# Patient Record
Sex: MALE | Race: WHITE | NOT HISPANIC OR LATINO | URBAN - METROPOLITAN AREA
[De-identification: names, ages, dates, MRNs, and addresses within clinical notes are randomized per-mention and may not be internally consistent; named-entity substitution may affect disease eponyms.]

---

## 2017-02-13 ENCOUNTER — OFFICE (OUTPATIENT)
Dept: URBAN - METROPOLITAN AREA CLINIC 64 | Facility: CLINIC | Age: 32
End: 2017-02-13
Payer: COMMERCIAL

## 2017-02-13 VITALS
HEIGHT: 72 IN | SYSTOLIC BLOOD PRESSURE: 136 MMHG | HEART RATE: 85 BPM | DIASTOLIC BLOOD PRESSURE: 74 MMHG | WEIGHT: 225 LBS

## 2017-02-13 DIAGNOSIS — K58.9 IRRITABLE BOWEL SYNDROME WITHOUT DIARRHEA: ICD-10-CM

## 2017-02-13 PROCEDURE — 99212 OFFICE O/P EST SF 10 MIN: CPT | Performed by: INTERNAL MEDICINE

## 2017-02-14 ENCOUNTER — CONVERSION ENCOUNTER (OUTPATIENT)
Dept: FAMILY MEDICINE CLINIC | Facility: CLINIC | Age: 32
End: 2017-02-14

## 2017-02-23 ENCOUNTER — HOSPITAL ENCOUNTER (OUTPATIENT)
Dept: PHYSICAL THERAPY | Facility: HOSPITAL | Age: 32
Setting detail: RECURRING SERIES
Discharge: HOME | End: 2017-04-18
Attending: FAMILY MEDICINE | Admitting: FAMILY MEDICINE

## 2017-07-25 ENCOUNTER — CONVERSION ENCOUNTER (OUTPATIENT)
Dept: FAMILY MEDICINE CLINIC | Facility: CLINIC | Age: 32
End: 2017-07-25

## 2017-08-16 ENCOUNTER — OFFICE (OUTPATIENT)
Dept: URBAN - METROPOLITAN AREA CLINIC 64 | Facility: CLINIC | Age: 32
End: 2017-08-16
Payer: COMMERCIAL

## 2017-08-16 VITALS
DIASTOLIC BLOOD PRESSURE: 71 MMHG | WEIGHT: 230 LBS | HEIGHT: 72 IN | HEART RATE: 91 BPM | SYSTOLIC BLOOD PRESSURE: 113 MMHG

## 2017-08-16 DIAGNOSIS — K58.9 IRRITABLE BOWEL SYNDROME WITHOUT DIARRHEA: ICD-10-CM

## 2017-08-16 PROCEDURE — 99213 OFFICE O/P EST LOW 20 MIN: CPT | Performed by: INTERNAL MEDICINE

## 2017-08-16 RX ORDER — NORTRIPTYLINE HYDROCHLORIDE 50 MG/1
CAPSULE ORAL
Qty: 180 | Refills: 2 | Status: ACTIVE

## 2017-08-16 RX ORDER — DICYCLOMINE HYDROCHLORIDE 20 MG/1
40 TABLET ORAL
Qty: 180 | Refills: 3 | Status: ACTIVE

## 2017-09-18 ENCOUNTER — CONVERSION ENCOUNTER (OUTPATIENT)
Dept: FAMILY MEDICINE CLINIC | Facility: CLINIC | Age: 32
End: 2017-09-18

## 2017-11-10 ENCOUNTER — OFFICE (OUTPATIENT)
Dept: URBAN - METROPOLITAN AREA CLINIC 64 | Facility: CLINIC | Age: 32
End: 2017-11-10
Payer: COMMERCIAL

## 2017-11-10 VITALS
SYSTOLIC BLOOD PRESSURE: 130 MMHG | WEIGHT: 230 LBS | HEIGHT: 72 IN | DIASTOLIC BLOOD PRESSURE: 85 MMHG | HEART RATE: 85 BPM

## 2017-11-10 DIAGNOSIS — K58.1 IRRITABLE BOWEL SYNDROME WITH CONSTIPATION: ICD-10-CM

## 2017-11-10 PROCEDURE — 99213 OFFICE O/P EST LOW 20 MIN: CPT | Performed by: INTERNAL MEDICINE

## 2017-12-26 ENCOUNTER — CONVERSION ENCOUNTER (OUTPATIENT)
Dept: FAMILY MEDICINE CLINIC | Facility: CLINIC | Age: 32
End: 2017-12-26

## 2017-12-26 ENCOUNTER — HOSPITAL ENCOUNTER (OUTPATIENT)
Dept: LAB | Facility: HOSPITAL | Age: 32
Discharge: HOME | End: 2017-12-26
Attending: INTERNAL MEDICINE | Admitting: INTERNAL MEDICINE

## 2017-12-26 LAB
ALBUMIN SERPL-MCNC: 4.5 G/DL (ref 3.5–4.8)
ALBUMIN/GLOB SERPL: 1.6 {RATIO} (ref 1–1.7)
ALP SERPL-CCNC: 65 IU/L (ref 32–91)
ALT SERPL-CCNC: 36 IU/L (ref 17–63)
ANION GAP SERPL CALC-SCNC: 13.1 MMOL/L (ref 10–20)
AST SERPL-CCNC: 22 IU/L (ref 15–41)
BASOPHILS # BLD AUTO: 0 10*3/UL (ref 0–0.2)
BASOPHILS NFR BLD AUTO: 0 % (ref 0–2)
BILIRUB SERPL-MCNC: 0.4 MG/DL (ref 0.3–1.2)
BILIRUB UR QL STRIP: NEGATIVE MG/DL
BUN SERPL-MCNC: 11 MG/DL (ref 8–20)
BUN/CREAT SERPL: 12.2 (ref 6.2–20.3)
CALCIUM SERPL-MCNC: 9.5 MG/DL (ref 8.9–10.3)
CASTS URNS QL MICRO: NORMAL /[LPF]
CHLORIDE SERPL-SCNC: 97 MMOL/L (ref 101–111)
COLOR UR: YELLOW
CONV BACTERIA IN URINE MICRO: NEGATIVE
CONV CLARITY OF URINE: CLEAR
CONV CO2: 30 MMOL/L (ref 22–32)
CONV HIV-1/ HIV-2: NORMAL
CONV HIV-1/ HIV-2: NORMAL
CONV HYALINE CASTS IN URINE MICRO: 2 /[LPF] (ref 0–5)
CONV PROTEIN IN URINE BY AUTOMATED TEST STRIP: NEGATIVE MG/DL
CONV SMALL ROUND CELLS: NORMAL /[HPF]
CONV TOTAL PROTEIN: 7.4 G/DL (ref 6.1–7.9)
CONV UROBILINOGEN IN URINE BY AUTOMATED TEST STRIP: 0.2 MG/DL
CREAT UR-MCNC: 0.9 MG/DL (ref 0.7–1.2)
CRP SERPL-MCNC: 0.45 MG/DL (ref 0–0.7)
CULTURE INDICATED?: NORMAL
DIFFERENTIAL METHOD BLD: (no result)
EOSINOPHIL # BLD AUTO: 0.2 10*3/UL (ref 0–0.3)
EOSINOPHIL # BLD AUTO: 3 % (ref 0–3)
ERYTHROCYTE [DISTWIDTH] IN BLOOD BY AUTOMATED COUNT: 12.8 % (ref 11.5–14.5)
ERYTHROCYTE [SEDIMENTATION RATE] IN BLOOD BY WESTERGREN METHOD: 4 MM/HR (ref 0–15)
GLOBULIN UR ELPH-MCNC: 2.9 G/DL (ref 2.5–3.8)
GLUCOSE SERPL-MCNC: 90 MG/DL (ref 65–99)
GLUCOSE UR QL: NEGATIVE MG/DL
HCT VFR BLD AUTO: 48.6 % (ref 40–54)
HGB BLD-MCNC: 16.6 G/DL (ref 14–18)
HGB UR QL STRIP: NEGATIVE
KETONES UR QL STRIP: NEGATIVE MG/DL
LEUKOCYTE ESTERASE UR QL STRIP: NEGATIVE
LYMPHOCYTES # BLD AUTO: 1.5 10*3/UL (ref 0.8–4.8)
LYMPHOCYTES NFR BLD AUTO: 23 % (ref 18–42)
MCH RBC QN AUTO: 30.6 PG (ref 26–32)
MCHC RBC AUTO-ENTMCNC: 34 G/DL (ref 32–36)
MCV RBC AUTO: 89.9 FL (ref 80–94)
MONOCYTES # BLD AUTO: 0.6 10*3/UL (ref 0.1–1.3)
MONOCYTES NFR BLD AUTO: 10 % (ref 2–11)
NEUTROPHILS # BLD AUTO: 4 10*3/UL (ref 2.3–8.6)
NEUTROPHILS NFR BLD AUTO: 64 % (ref 50–75)
NITRITE UR QL STRIP: NEGATIVE
NRBC BLD AUTO-RTO: 0 /100{WBCS}
NRBC/RBC NFR BLD MANUAL: 0 10*3/UL
PH UR STRIP.AUTO: 7 [PH] (ref 4.5–8)
PLATELET # BLD AUTO: 340 10*3/UL (ref 150–450)
PMV BLD AUTO: 7.1 FL (ref 7.4–10.4)
POTASSIUM SERPL-SCNC: 4.1 MMOL/L (ref 3.6–5.1)
RBC # BLD AUTO: 5.41 10*6/UL (ref 4.6–6)
RBC #/AREA URNS HPF: 0 /[HPF] (ref 0–3)
SODIUM SERPL-SCNC: 136 MMOL/L (ref 136–144)
SP GR UR: 1.02 (ref 1–1.03)
SPERM URNS QL MICRO: NORMAL /[HPF]
SQUAMOUS SPT QL MICRO: 0 /[HPF] (ref 0–5)
UNIDENT CRYS URNS QL MICRO: NORMAL /[HPF]
WBC # BLD AUTO: 6.3 10*3/UL (ref 4.5–11.5)
WBC #/AREA URNS HPF: 0 /[HPF] (ref 0–5)
YEAST SPEC QL WET PREP: NORMAL /[HPF]

## 2017-12-27 LAB
HAV IGM SERPL QL IA: NONREACTIVE
HBV CORE IGM SERPL QL IA: ABNORMAL
HBV SURFACE AG SERPL QL IA: NONREACTIVE
HCV AB SER DONR QL: ABNORMAL
HCV AB SER DONR QL: ABNORMAL

## 2018-01-11 ENCOUNTER — OFFICE (OUTPATIENT)
Dept: URBAN - METROPOLITAN AREA CLINIC 64 | Facility: CLINIC | Age: 33
End: 2018-01-11
Payer: COMMERCIAL

## 2018-01-11 VITALS
SYSTOLIC BLOOD PRESSURE: 122 MMHG | WEIGHT: 220 LBS | HEIGHT: 72 IN | DIASTOLIC BLOOD PRESSURE: 77 MMHG | HEART RATE: 96 BPM

## 2018-01-11 DIAGNOSIS — K58.9 IRRITABLE BOWEL SYNDROME WITHOUT DIARRHEA: ICD-10-CM

## 2018-01-11 DIAGNOSIS — K59.00 CONSTIPATION, UNSPECIFIED: ICD-10-CM

## 2018-01-11 PROCEDURE — 99213 OFFICE O/P EST LOW 20 MIN: CPT | Performed by: INTERNAL MEDICINE

## 2018-01-17 ENCOUNTER — CONVERSION ENCOUNTER (OUTPATIENT)
Dept: FAMILY MEDICINE CLINIC | Facility: CLINIC | Age: 33
End: 2018-01-17

## 2018-01-31 ENCOUNTER — CONVERSION ENCOUNTER (OUTPATIENT)
Dept: FAMILY MEDICINE CLINIC | Facility: CLINIC | Age: 33
End: 2018-01-31

## 2018-02-22 ENCOUNTER — HOSPITAL ENCOUNTER (OUTPATIENT)
Dept: RHEUMATOLOGY | Facility: CLINIC | Age: 33
Discharge: HOME | End: 2018-02-22
Attending: INTERNAL MEDICINE | Admitting: INTERNAL MEDICINE

## 2018-02-22 ENCOUNTER — CONVERSION ENCOUNTER (OUTPATIENT)
Dept: FAMILY MEDICINE CLINIC | Facility: CLINIC | Age: 33
End: 2018-02-22

## 2018-02-23 ENCOUNTER — CONVERSION ENCOUNTER (OUTPATIENT)
Dept: FAMILY MEDICINE CLINIC | Facility: CLINIC | Age: 33
End: 2018-02-23

## 2018-03-05 ENCOUNTER — HOSPITAL ENCOUNTER (OUTPATIENT)
Dept: CT IMAGING | Facility: HOSPITAL | Age: 33
Discharge: HOME | End: 2018-03-05
Attending: FAMILY MEDICINE | Admitting: FAMILY MEDICINE

## 2018-03-05 ENCOUNTER — HOSPITAL ENCOUNTER (OUTPATIENT)
Dept: FAMILY MEDICINE CLINIC | Facility: CLINIC | Age: 33
Setting detail: SPECIMEN
Discharge: HOME | End: 2018-03-05
Attending: FAMILY MEDICINE | Admitting: FAMILY MEDICINE

## 2018-03-05 ENCOUNTER — CONVERSION ENCOUNTER (OUTPATIENT)
Dept: FAMILY MEDICINE CLINIC | Facility: CLINIC | Age: 33
End: 2018-03-05

## 2018-03-05 LAB
ALBUMIN SERPL-MCNC: 4.5 G/DL (ref 3.5–4.8)
ALBUMIN/GLOB SERPL: 1.5 {RATIO} (ref 1–1.7)
ALP SERPL-CCNC: 65 IU/L (ref 32–91)
ALT SERPL-CCNC: 51 IU/L (ref 17–63)
ANION GAP SERPL CALC-SCNC: 13 MMOL/L (ref 10–20)
AST SERPL-CCNC: 27 IU/L (ref 15–41)
BASOPHILS # BLD AUTO: 0.1 10*3/UL (ref 0–0.2)
BASOPHILS NFR BLD AUTO: 1 % (ref 0–2)
BILIRUB SERPL-MCNC: 0.7 MG/DL (ref 0.3–1.2)
BUN SERPL-MCNC: 11 MG/DL (ref 8–20)
BUN/CREAT SERPL: 13.8 (ref 6.2–20.3)
CALCIUM SERPL-MCNC: 9.7 MG/DL (ref 8.9–10.3)
CHLORIDE SERPL-SCNC: 102 MMOL/L (ref 101–111)
CONV CO2: 26 MMOL/L (ref 22–32)
CONV TOTAL PROTEIN: 7.5 G/DL (ref 6.1–7.9)
CREAT UR-MCNC: 0.8 MG/DL (ref 0.7–1.2)
DIFFERENTIAL METHOD BLD: (no result)
EOSINOPHIL # BLD AUTO: 0.4 10*3/UL (ref 0–0.3)
EOSINOPHIL # BLD AUTO: 6 % (ref 0–3)
ERYTHROCYTE [DISTWIDTH] IN BLOOD BY AUTOMATED COUNT: 12.4 % (ref 11.5–14.5)
GLOBULIN UR ELPH-MCNC: 3 G/DL (ref 2.5–3.8)
GLUCOSE SERPL-MCNC: 91 MG/DL (ref 65–99)
HCT VFR BLD AUTO: 46.5 % (ref 40–54)
HGB BLD-MCNC: 15.9 G/DL (ref 14–18)
LYMPHOCYTES # BLD AUTO: 2 10*3/UL (ref 0.8–4.8)
LYMPHOCYTES NFR BLD AUTO: 27 % (ref 18–42)
MCH RBC QN AUTO: 30.4 PG (ref 26–32)
MCHC RBC AUTO-ENTMCNC: 34.1 G/DL (ref 32–36)
MCV RBC AUTO: 89.2 FL (ref 80–94)
MONOCYTES # BLD AUTO: 0.8 10*3/UL (ref 0.1–1.3)
MONOCYTES NFR BLD AUTO: 10 % (ref 2–11)
NEUTROPHILS # BLD AUTO: 4.3 10*3/UL (ref 2.3–8.6)
NEUTROPHILS NFR BLD AUTO: 56 % (ref 50–75)
NRBC BLD AUTO-RTO: 0 /100{WBCS}
NRBC/RBC NFR BLD MANUAL: 0 10*3/UL
PLATELET # BLD AUTO: 386 10*3/UL (ref 150–450)
PMV BLD AUTO: 7.6 FL (ref 7.4–10.4)
POTASSIUM SERPL-SCNC: 4 MMOL/L (ref 3.6–5.1)
RBC # BLD AUTO: 5.22 10*6/UL (ref 4.6–6)
SODIUM SERPL-SCNC: 137 MMOL/L (ref 136–144)
WBC # BLD AUTO: 7.5 10*3/UL (ref 4.5–11.5)

## 2018-03-12 ENCOUNTER — HOSPITAL ENCOUNTER (OUTPATIENT)
Dept: CARDIOLOGY | Facility: HOSPITAL | Age: 33
Discharge: HOME | End: 2018-03-12
Attending: INTERNAL MEDICINE | Admitting: INTERNAL MEDICINE

## 2018-04-23 ENCOUNTER — HOSPITAL ENCOUNTER (OUTPATIENT)
Dept: LAB | Facility: HOSPITAL | Age: 33
Discharge: HOME | End: 2018-04-23
Attending: INTERNAL MEDICINE | Admitting: INTERNAL MEDICINE

## 2018-04-24 ENCOUNTER — CONVERSION ENCOUNTER (OUTPATIENT)
Dept: FAMILY MEDICINE CLINIC | Facility: CLINIC | Age: 33
End: 2018-04-24

## 2018-04-25 LAB — HBV CORE IGM SERPL QL IA: NONREACTIVE

## 2018-05-07 ENCOUNTER — CONVERSION ENCOUNTER (OUTPATIENT)
Dept: FAMILY MEDICINE CLINIC | Facility: CLINIC | Age: 33
End: 2018-05-07

## 2018-09-21 ENCOUNTER — HOSPITAL ENCOUNTER (OUTPATIENT)
Dept: FAMILY MEDICINE CLINIC | Facility: CLINIC | Age: 33
Setting detail: SPECIMEN
Discharge: HOME | End: 2018-09-21
Attending: FAMILY MEDICINE | Admitting: FAMILY MEDICINE

## 2018-09-21 ENCOUNTER — CONVERSION ENCOUNTER (OUTPATIENT)
Dept: FAMILY MEDICINE CLINIC | Facility: CLINIC | Age: 33
End: 2018-09-21

## 2018-09-21 LAB
ALBUMIN SERPL-MCNC: 4.6 G/DL (ref 3.5–4.8)
ALBUMIN/GLOB SERPL: 1.5 {RATIO} (ref 1–1.7)
ALP SERPL-CCNC: 63 IU/L (ref 32–91)
ALT SERPL-CCNC: 33 IU/L (ref 17–63)
ANION GAP SERPL CALC-SCNC: 11.4 MMOL/L (ref 10–20)
AST SERPL-CCNC: 22 IU/L (ref 15–41)
BASOPHILS # BLD AUTO: 0 10*3/UL (ref 0–0.2)
BASOPHILS NFR BLD AUTO: 1 % (ref 0–2)
BILIRUB SERPL-MCNC: 0.7 MG/DL (ref 0.3–1.2)
BUN SERPL-MCNC: 12 MG/DL (ref 8–20)
BUN/CREAT SERPL: 13.3 (ref 6.2–20.3)
CALCIUM SERPL-MCNC: 9.6 MG/DL (ref 8.9–10.3)
CHLORIDE SERPL-SCNC: 99 MMOL/L (ref 101–111)
CONV CO2: 31 MMOL/L (ref 22–32)
CONV TOTAL PROTEIN: 7.7 G/DL (ref 6.1–7.9)
CREAT UR-MCNC: 0.9 MG/DL (ref 0.7–1.2)
DIFFERENTIAL METHOD BLD: (no result)
EOSINOPHIL # BLD AUTO: 0.3 10*3/UL (ref 0–0.3)
EOSINOPHIL # BLD AUTO: 3 % (ref 0–3)
ERYTHROCYTE [DISTWIDTH] IN BLOOD BY AUTOMATED COUNT: 12.8 % (ref 11.5–14.5)
GLOBULIN UR ELPH-MCNC: 3.1 G/DL (ref 2.5–3.8)
GLUCOSE SERPL-MCNC: 86 MG/DL (ref 65–99)
HCT VFR BLD AUTO: 49 % (ref 40–54)
HGB BLD-MCNC: 16.6 G/DL (ref 14–18)
LYMPHOCYTES # BLD AUTO: 1.9 10*3/UL (ref 0.8–4.8)
LYMPHOCYTES NFR BLD AUTO: 22 % (ref 18–42)
MCH RBC QN AUTO: 30.4 PG (ref 26–32)
MCHC RBC AUTO-ENTMCNC: 33.9 G/DL (ref 32–36)
MCV RBC AUTO: 89.7 FL (ref 80–94)
MONOCYTES # BLD AUTO: 0.9 10*3/UL (ref 0.1–1.3)
MONOCYTES NFR BLD AUTO: 10 % (ref 2–11)
NEUTROPHILS # BLD AUTO: 5.5 10*3/UL (ref 2.3–8.6)
NEUTROPHILS NFR BLD AUTO: 64 % (ref 50–75)
NRBC BLD AUTO-RTO: 0 /100{WBCS}
NRBC/RBC NFR BLD MANUAL: 0 10*3/UL
PLATELET # BLD AUTO: 353 10*3/UL (ref 150–450)
PMV BLD AUTO: 8.6 FL (ref 7.4–10.4)
POTASSIUM SERPL-SCNC: 4.4 MMOL/L (ref 3.6–5.1)
RBC # BLD AUTO: 5.46 10*6/UL (ref 4.6–6)
SODIUM SERPL-SCNC: 137 MMOL/L (ref 136–144)
WBC # BLD AUTO: 8.6 10*3/UL (ref 4.5–11.5)

## 2018-10-25 ENCOUNTER — HOSPITAL ENCOUNTER (OUTPATIENT)
Dept: LAB | Facility: HOSPITAL | Age: 33
Discharge: HOME | End: 2018-10-25
Attending: INTERNAL MEDICINE | Admitting: INTERNAL MEDICINE

## 2018-10-25 ENCOUNTER — OFFICE (OUTPATIENT)
Dept: URBAN - METROPOLITAN AREA CLINIC 64 | Facility: CLINIC | Age: 33
End: 2018-10-25
Payer: COMMERCIAL

## 2018-10-25 VITALS
HEART RATE: 106 BPM | HEIGHT: 72 IN | WEIGHT: 216 LBS | SYSTOLIC BLOOD PRESSURE: 112 MMHG | DIASTOLIC BLOOD PRESSURE: 76 MMHG

## 2018-10-25 DIAGNOSIS — K58.9 IRRITABLE BOWEL SYNDROME WITHOUT DIARRHEA: ICD-10-CM

## 2018-10-25 DIAGNOSIS — F84.0 AUTISTIC DISORDER: ICD-10-CM

## 2018-10-25 DIAGNOSIS — R53.83 OTHER FATIGUE: ICD-10-CM

## 2018-10-25 DIAGNOSIS — R10.32 LEFT LOWER QUADRANT PAIN: ICD-10-CM

## 2018-10-25 LAB
TESTOST SERPL-MCNC: 2.01 NG/ML (ref 1.7–7.8)
TSH SERPL-ACNC: 0.96 UIU/ML (ref 0.34–5.6)

## 2018-10-25 PROCEDURE — 99214 OFFICE O/P EST MOD 30 MIN: CPT | Performed by: INTERNAL MEDICINE

## 2018-10-25 RX ORDER — NORTRIPTYLINE HYDROCHLORIDE 50 MG/1
CAPSULE ORAL
Qty: 180 | Refills: 2 | Status: ACTIVE

## 2018-10-25 RX ORDER — DICYCLOMINE HYDROCHLORIDE 20 MG/1
40 TABLET ORAL
Qty: 180 | Refills: 3 | Status: ACTIVE

## 2018-10-25 RX ORDER — METRONIDAZOLE 500 MG/1
1500 TABLET, FILM COATED ORAL
Qty: 30 | Refills: 0 | Status: COMPLETED
Start: 2018-10-25 | End: 2019-01-08

## 2018-11-05 ENCOUNTER — HOSPITAL ENCOUNTER (OUTPATIENT)
Dept: LAB | Facility: HOSPITAL | Age: 33
Discharge: HOME | End: 2018-11-05
Attending: NURSE PRACTITIONER | Admitting: NURSE PRACTITIONER

## 2018-11-05 ENCOUNTER — CONVERSION ENCOUNTER (OUTPATIENT)
Dept: FAMILY MEDICINE CLINIC | Facility: CLINIC | Age: 33
End: 2018-11-05

## 2018-11-05 LAB
ALBUMIN SERPL-MCNC: 4.6 G/DL (ref 3.5–4.8)
ALBUMIN/GLOB SERPL: 1.7 {RATIO} (ref 1–1.7)
ALP SERPL-CCNC: 55 IU/L (ref 32–91)
ALT SERPL-CCNC: 39 IU/L (ref 17–63)
ANION GAP SERPL CALC-SCNC: 15.1 MMOL/L (ref 10–20)
AST SERPL-CCNC: 31 IU/L (ref 15–41)
BASOPHILS # BLD AUTO: 0 10*3/UL (ref 0–0.2)
BASOPHILS NFR BLD AUTO: 0 % (ref 0–2)
BILIRUB SERPL-MCNC: 0.8 MG/DL (ref 0.3–1.2)
BUN SERPL-MCNC: 9 MG/DL (ref 8–20)
BUN/CREAT SERPL: 10 (ref 6.2–20.3)
CALCIUM SERPL-MCNC: 9.4 MG/DL (ref 8.9–10.3)
CHLORIDE SERPL-SCNC: 96 MMOL/L (ref 101–111)
CONV CO2: 26 MMOL/L (ref 22–32)
CONV TOTAL PROTEIN: 7.3 G/DL (ref 6.1–7.9)
CREAT UR-MCNC: 0.9 MG/DL (ref 0.7–1.2)
CRP SERPL-MCNC: 0.41 MG/DL (ref 0–0.7)
DIFFERENTIAL METHOD BLD: (no result)
EOSINOPHIL # BLD AUTO: 0.1 10*3/UL (ref 0–0.3)
EOSINOPHIL # BLD AUTO: 1 % (ref 0–3)
ERYTHROCYTE [DISTWIDTH] IN BLOOD BY AUTOMATED COUNT: 12.7 % (ref 11.5–14.5)
ERYTHROCYTE [SEDIMENTATION RATE] IN BLOOD BY WESTERGREN METHOD: 5 MM/HR (ref 0–15)
GLOBULIN UR ELPH-MCNC: 2.7 G/DL (ref 2.5–3.8)
GLUCOSE SERPL-MCNC: 90 MG/DL (ref 65–99)
HCT VFR BLD AUTO: 46.8 % (ref 40–54)
HGB BLD-MCNC: 16.1 G/DL (ref 14–18)
LYMPHOCYTES # BLD AUTO: 1.8 10*3/UL (ref 0.8–4.8)
LYMPHOCYTES NFR BLD AUTO: 18 % (ref 18–42)
MAGNESIUM SERPL-MCNC: 1.9 MG/DL (ref 1.8–2.5)
MCH RBC QN AUTO: 30.3 PG (ref 26–32)
MCHC RBC AUTO-ENTMCNC: 34.4 G/DL (ref 32–36)
MCV RBC AUTO: 88.1 FL (ref 80–94)
MONOCYTES # BLD AUTO: 0.9 10*3/UL (ref 0.1–1.3)
MONOCYTES NFR BLD AUTO: 9 % (ref 2–11)
NEUTROPHILS # BLD AUTO: 7.1 10*3/UL (ref 2.3–8.6)
NEUTROPHILS NFR BLD AUTO: 72 % (ref 50–75)
NRBC BLD AUTO-RTO: 0 /100{WBCS}
NRBC/RBC NFR BLD MANUAL: 0 10*3/UL
PLATELET # BLD AUTO: 418 10*3/UL (ref 150–450)
PMV BLD AUTO: 7.8 FL (ref 7.4–10.4)
POTASSIUM SERPL-SCNC: 4.1 MMOL/L (ref 3.6–5.1)
RBC # BLD AUTO: 5.32 10*6/UL (ref 4.6–6)
SODIUM SERPL-SCNC: 133 MMOL/L (ref 136–144)
URATE SERPL-MCNC: 7.3 MG/DL (ref 4.8–8.7)
WBC # BLD AUTO: 10 10*3/UL (ref 4.5–11.5)

## 2018-11-13 ENCOUNTER — CONVERSION ENCOUNTER (OUTPATIENT)
Dept: FAMILY MEDICINE CLINIC | Facility: CLINIC | Age: 33
End: 2018-11-13

## 2018-12-04 ENCOUNTER — CONVERSION ENCOUNTER (OUTPATIENT)
Dept: FAMILY MEDICINE CLINIC | Facility: CLINIC | Age: 33
End: 2018-12-04

## 2019-01-07 ENCOUNTER — CONVERSION ENCOUNTER (OUTPATIENT)
Dept: FAMILY MEDICINE CLINIC | Facility: CLINIC | Age: 34
End: 2019-01-07

## 2019-01-08 ENCOUNTER — OFFICE (OUTPATIENT)
Dept: URBAN - METROPOLITAN AREA CLINIC 64 | Facility: CLINIC | Age: 34
End: 2019-01-08
Payer: COMMERCIAL

## 2019-01-08 VITALS
HEIGHT: 72 IN | HEART RATE: 99 BPM | DIASTOLIC BLOOD PRESSURE: 86 MMHG | SYSTOLIC BLOOD PRESSURE: 122 MMHG | WEIGHT: 213 LBS

## 2019-01-08 DIAGNOSIS — K58.9 IRRITABLE BOWEL SYNDROME WITHOUT DIARRHEA: ICD-10-CM

## 2019-01-08 DIAGNOSIS — F84.0 AUTISTIC DISORDER: ICD-10-CM

## 2019-01-08 PROCEDURE — 99213 OFFICE O/P EST LOW 20 MIN: CPT | Performed by: INTERNAL MEDICINE

## 2019-02-05 ENCOUNTER — HOSPITAL ENCOUNTER (OUTPATIENT)
Dept: PHYSICAL THERAPY | Facility: HOSPITAL | Age: 34
Setting detail: RECURRING SERIES
Discharge: HOME | End: 2019-03-29
Attending: FAMILY MEDICINE | Admitting: FAMILY MEDICINE

## 2019-02-19 ENCOUNTER — HOSPITAL ENCOUNTER (OUTPATIENT)
Dept: FAMILY MEDICINE CLINIC | Facility: CLINIC | Age: 34
Setting detail: SPECIMEN
Discharge: HOME | End: 2019-02-19
Attending: FAMILY MEDICINE | Admitting: FAMILY MEDICINE

## 2019-02-19 ENCOUNTER — CONVERSION ENCOUNTER (OUTPATIENT)
Dept: FAMILY MEDICINE CLINIC | Facility: CLINIC | Age: 34
End: 2019-02-19

## 2019-02-19 LAB
ALBUMIN SERPL-MCNC: 4.7 G/DL (ref 3.5–4.8)
ALBUMIN/GLOB SERPL: 1.6 {RATIO} (ref 1–1.7)
ALP SERPL-CCNC: 71 IU/L (ref 32–91)
ALT SERPL-CCNC: 38 IU/L (ref 17–63)
ANION GAP SERPL CALC-SCNC: 15.2 MMOL/L (ref 10–20)
AST SERPL-CCNC: 24 IU/L (ref 15–41)
BASOPHILS # BLD AUTO: 0.1 10*3/UL (ref 0–0.2)
BASOPHILS NFR BLD AUTO: 1 % (ref 0–2)
BILIRUB SERPL-MCNC: 0.9 MG/DL (ref 0.3–1.2)
BUN SERPL-MCNC: 10 MG/DL (ref 8–20)
BUN/CREAT SERPL: 12.5 (ref 6.2–20.3)
CALCIUM SERPL-MCNC: 9.3 MG/DL (ref 8.9–10.3)
CHLORIDE SERPL-SCNC: 95 MMOL/L (ref 101–111)
CONV CO2: 27 MMOL/L (ref 22–32)
CONV TOTAL PROTEIN: 7.6 G/DL (ref 6.1–7.9)
CREAT UR-MCNC: 0.8 MG/DL (ref 0.7–1.2)
DIFFERENTIAL METHOD BLD: (no result)
EOSINOPHIL # BLD AUTO: 0.2 10*3/UL (ref 0–0.3)
EOSINOPHIL # BLD AUTO: 2 % (ref 0–3)
ERYTHROCYTE [DISTWIDTH] IN BLOOD BY AUTOMATED COUNT: 12.7 % (ref 11.5–14.5)
GLOBULIN UR ELPH-MCNC: 2.9 G/DL (ref 2.5–3.8)
GLUCOSE SERPL-MCNC: 88 MG/DL (ref 65–99)
HCT VFR BLD AUTO: 48.7 % (ref 40–54)
HGB BLD-MCNC: 16.5 G/DL (ref 14–18)
LYMPHOCYTES # BLD AUTO: 1.9 10*3/UL (ref 0.8–4.8)
LYMPHOCYTES NFR BLD AUTO: 23 % (ref 18–42)
MCH RBC QN AUTO: 30.9 PG (ref 26–32)
MCHC RBC AUTO-ENTMCNC: 33.8 G/DL (ref 32–36)
MCV RBC AUTO: 91.4 FL (ref 80–94)
MONOCYTES # BLD AUTO: 0.8 10*3/UL (ref 0.1–1.3)
MONOCYTES NFR BLD AUTO: 10 % (ref 2–11)
NEUTROPHILS # BLD AUTO: 5.4 10*3/UL (ref 2.3–8.6)
NEUTROPHILS NFR BLD AUTO: 64 % (ref 50–75)
NRBC BLD AUTO-RTO: 0 /100{WBCS}
NRBC/RBC NFR BLD MANUAL: 0 10*3/UL
PLATELET # BLD AUTO: 395 10*3/UL (ref 150–450)
PMV BLD AUTO: 7.7 FL (ref 7.4–10.4)
POTASSIUM SERPL-SCNC: 4.2 MMOL/L (ref 3.6–5.1)
RBC # BLD AUTO: 5.33 10*6/UL (ref 4.6–6)
SODIUM SERPL-SCNC: 133 MMOL/L (ref 136–144)
WBC # BLD AUTO: 8.5 10*3/UL (ref 4.5–11.5)

## 2019-04-16 ENCOUNTER — OFFICE (OUTPATIENT)
Dept: URBAN - METROPOLITAN AREA CLINIC 64 | Facility: CLINIC | Age: 34
End: 2019-04-16
Payer: COMMERCIAL

## 2019-04-16 VITALS
SYSTOLIC BLOOD PRESSURE: 129 MMHG | DIASTOLIC BLOOD PRESSURE: 95 MMHG | HEIGHT: 72 IN | WEIGHT: 215 LBS | HEART RATE: 96 BPM

## 2019-04-16 DIAGNOSIS — K59.00 CONSTIPATION, UNSPECIFIED: ICD-10-CM

## 2019-04-16 DIAGNOSIS — K58.9 IRRITABLE BOWEL SYNDROME WITHOUT DIARRHEA: ICD-10-CM

## 2019-04-16 DIAGNOSIS — K21.9 GASTRO-ESOPHAGEAL REFLUX DISEASE WITHOUT ESOPHAGITIS: ICD-10-CM

## 2019-04-16 PROCEDURE — 99213 OFFICE O/P EST LOW 20 MIN: CPT | Performed by: INTERNAL MEDICINE

## 2019-04-16 RX ORDER — ESOMEPRAZOLE MAGNESIUM 40 MG/1
40 CAPSULE, DELAYED RELEASE ORAL
Qty: 30 | Refills: 11 | Status: COMPLETED
Start: 2019-04-16 | End: 2020-11-02

## 2019-04-16 RX ORDER — POLYETHYLENE GLYCOL 3350 17 G/17G
17 POWDER, FOR SOLUTION ORAL
Qty: 510 | Refills: 11 | Status: COMPLETED
Start: 2019-04-16 | End: 2021-05-13

## 2019-05-06 ENCOUNTER — HOSPITAL ENCOUNTER (OUTPATIENT)
Dept: LAB | Facility: HOSPITAL | Age: 34
Discharge: HOME | End: 2019-05-06
Attending: INTERNAL MEDICINE | Admitting: INTERNAL MEDICINE

## 2019-06-04 VITALS
HEART RATE: 97 BPM | HEART RATE: 106 BPM | DIASTOLIC BLOOD PRESSURE: 77 MMHG | BODY MASS INDEX: 30.07 KG/M2 | WEIGHT: 213 LBS | BODY MASS INDEX: 28.62 KG/M2 | SYSTOLIC BLOOD PRESSURE: 132 MMHG | DIASTOLIC BLOOD PRESSURE: 85 MMHG | BODY MASS INDEX: 29.8 KG/M2 | DIASTOLIC BLOOD PRESSURE: 76 MMHG | BODY MASS INDEX: 29.02 KG/M2 | OXYGEN SATURATION: 97 % | BODY MASS INDEX: 31.19 KG/M2 | HEART RATE: 118 BPM | WEIGHT: 227 LBS | BODY MASS INDEX: 28.44 KG/M2 | SYSTOLIC BLOOD PRESSURE: 133 MMHG | OXYGEN SATURATION: 98 % | DIASTOLIC BLOOD PRESSURE: 79 MMHG | WEIGHT: 222 LBS | OXYGEN SATURATION: 97 % | OXYGEN SATURATION: 97 % | WEIGHT: 219 LBS | HEIGHT: 72 IN | DIASTOLIC BLOOD PRESSURE: 94 MMHG | HEART RATE: 94 BPM | HEART RATE: 104 BPM | WEIGHT: 214 LBS | SYSTOLIC BLOOD PRESSURE: 123 MMHG | BODY MASS INDEX: 28.35 KG/M2 | HEART RATE: 93 BPM | WEIGHT: 209 LBS | HEIGHT: 72 IN | WEIGHT: 230 LBS | HEART RATE: 86 BPM | SYSTOLIC BLOOD PRESSURE: 118 MMHG | BODY MASS INDEX: 28.89 KG/M2 | WEIGHT: 220 LBS | BODY MASS INDEX: 29.8 KG/M2 | HEIGHT: 72 IN | HEART RATE: 68 BPM | DIASTOLIC BLOOD PRESSURE: 83 MMHG | OXYGEN SATURATION: 97 % | DIASTOLIC BLOOD PRESSURE: 80 MMHG | WEIGHT: 222 LBS | WEIGHT: 212.25 LBS | OXYGEN SATURATION: 96 % | WEIGHT: 210 LBS | DIASTOLIC BLOOD PRESSURE: 83 MMHG | HEART RATE: 106 BPM | OXYGEN SATURATION: 97 % | BODY MASS INDEX: 28.75 KG/M2 | SYSTOLIC BLOOD PRESSURE: 126 MMHG | WEIGHT: 214 LBS | SYSTOLIC BLOOD PRESSURE: 112 MMHG | HEIGHT: 72 IN | BODY MASS INDEX: 29.66 KG/M2 | SYSTOLIC BLOOD PRESSURE: 117 MMHG | DIASTOLIC BLOOD PRESSURE: 76 MMHG | BODY MASS INDEX: 30.11 KG/M2 | HEART RATE: 103 BPM | HEART RATE: 80 BPM | HEIGHT: 72 IN | WEIGHT: 226.6 LBS | SYSTOLIC BLOOD PRESSURE: 124 MMHG | SYSTOLIC BLOOD PRESSURE: 149 MMHG | DIASTOLIC BLOOD PRESSURE: 94 MMHG | DIASTOLIC BLOOD PRESSURE: 87 MMHG | DIASTOLIC BLOOD PRESSURE: 90 MMHG | SYSTOLIC BLOOD PRESSURE: 127 MMHG | DIASTOLIC BLOOD PRESSURE: 74 MMHG | BODY MASS INDEX: 30.75 KG/M2 | HEIGHT: 72 IN | OXYGEN SATURATION: 97 % | SYSTOLIC BLOOD PRESSURE: 118 MMHG | HEART RATE: 110 BPM | OXYGEN SATURATION: 96 % | OXYGEN SATURATION: 98 % | HEART RATE: 104 BPM | SYSTOLIC BLOOD PRESSURE: 137 MMHG | SYSTOLIC BLOOD PRESSURE: 145 MMHG | SYSTOLIC BLOOD PRESSURE: 134 MMHG | HEART RATE: 115 BPM | WEIGHT: 219 LBS | DIASTOLIC BLOOD PRESSURE: 87 MMHG | OXYGEN SATURATION: 97 % | DIASTOLIC BLOOD PRESSURE: 78 MMHG | HEIGHT: 72 IN | WEIGHT: 229 LBS | HEART RATE: 113 BPM | BODY MASS INDEX: 29.7 KG/M2 | OXYGEN SATURATION: 96 % | WEIGHT: 211 LBS | OXYGEN SATURATION: 97 % | WEIGHT: 220 LBS | BODY MASS INDEX: 31.06 KG/M2 | SYSTOLIC BLOOD PRESSURE: 136 MMHG | SYSTOLIC BLOOD PRESSURE: 137 MMHG | DIASTOLIC BLOOD PRESSURE: 79 MMHG | HEART RATE: 90 BPM

## 2019-07-05 ENCOUNTER — OFFICE VISIT (OUTPATIENT)
Dept: FAMILY MEDICINE CLINIC | Facility: CLINIC | Age: 34
End: 2019-07-05

## 2019-07-05 VITALS
TEMPERATURE: 97 F | OXYGEN SATURATION: 99 % | BODY MASS INDEX: 27.72 KG/M2 | HEIGHT: 74 IN | SYSTOLIC BLOOD PRESSURE: 125 MMHG | WEIGHT: 216 LBS | DIASTOLIC BLOOD PRESSURE: 88 MMHG | HEART RATE: 91 BPM

## 2019-07-05 DIAGNOSIS — S16.1XXA NECK STRAIN, INITIAL ENCOUNTER: Primary | ICD-10-CM

## 2019-07-05 PROBLEM — K59.00 CONSTIPATION: Status: ACTIVE | Noted: 2018-09-21

## 2019-07-05 PROBLEM — I10 HYPERTENSION: Status: ACTIVE | Noted: 2019-07-05

## 2019-07-05 PROBLEM — R48.0 DYSLEXIA: Status: ACTIVE | Noted: 2017-12-26

## 2019-07-05 PROBLEM — R76.8 ELEVATED RHEUMATOID FACTOR: Status: ACTIVE | Noted: 2019-05-06

## 2019-07-05 PROBLEM — K58.9 IBS (IRRITABLE BOWEL SYNDROME): Status: ACTIVE | Noted: 2018-09-21

## 2019-07-05 PROBLEM — F32.A DEPRESSION: Status: ACTIVE | Noted: 2019-07-05

## 2019-07-05 PROBLEM — E55.9 VITAMIN D DEFICIENCY: Status: ACTIVE | Noted: 2017-12-26

## 2019-07-05 PROBLEM — R55 NEAR SYNCOPE: Status: ACTIVE | Noted: 2019-02-19

## 2019-07-05 PROBLEM — F41.9 ANXIETY DISORDER: Status: ACTIVE | Noted: 2018-09-21

## 2019-07-05 PROBLEM — G56.00 CARPAL TUNNEL SYNDROME: Status: ACTIVE | Noted: 2018-02-22

## 2019-07-05 PROCEDURE — 99213 OFFICE O/P EST LOW 20 MIN: CPT | Performed by: FAMILY MEDICINE

## 2019-07-05 RX ORDER — NAPROXEN 500 MG/1
500 TABLET ORAL 2 TIMES DAILY WITH MEALS
Qty: 60 TABLET | Refills: 2 | Status: SHIPPED | OUTPATIENT
Start: 2019-07-05 | End: 2020-12-14

## 2019-07-05 NOTE — PROGRESS NOTES
Subjective   Chief Complaint   Patient presents with   • Neck Pain     x 1 wk ago that is now feeling better   • Nausea     Davian Arevalo is a 33 y.o. male.     Neck Pain    This is a new problem. The current episode started in the past 7 days. The problem occurs intermittently. The problem has been gradually improving. The pain is associated with nothing. The pain is present in the midline. The quality of the pain is described as aching. The pain is severe (nausea). The symptoms are aggravated by position. Pertinent negatives include no chest pain, fever, syncope or trouble swallowing. He has tried home exercises for the symptoms. The treatment provided moderate relief.      Past Medical History:   Diagnosis Date   • Abdominal pain, chronic, right lower quadrant 01/30/2015   • Anxiety    • Autism 08/13/2014    mild ( Abstracted from [facility]   • Carpal tunnel syndrome 02/22/2018   • Chest pain 02/23/2018   • Constipation 09/21/2018   • Depression    • Dermatitis 01/07/2019   • Dyslexia 12/26/2017   • Fatigue 08/20/2015   • Hypertension    • IBS (irritable bowel syndrome) 09/21/2018   • Joint pain 12/26/2017   • Knee pain, left 07/25/2017   • Lower back pain 01/30/2015   • Near syncope 02/19/2019   • Neck pain on left side 11/13/2018   • Numbness and tingling of right arm 12/26/2017   • Pain in joint of right wrist 02/22/2018   • Rheumatoid factor positive 05/06/2019   • Right cervical radiculopathy 01/07/2019   • Shoulder pain, right 02/14/2017   • Tachycardia 02/19/2019   • Visit for preventive health examination 08/13/2014   • Vitamin D deficiency 12/26/2017     Past Surgical History:   Procedure Laterality Date   • CHOLECYSTECTOMY     • TONSILLECTOMY  1993    Tonsillectomy and Adenoids-     Allergies   Allergen Reactions   • Amoxicillin Unknown (See Comments)     Abstracted from [facility].    • Ceclor [Cefaclor] Unknown (See Comments)     Abstracted from [facility].    • Cephalexin Unknown (See  Comments)     Abstracted from [facility].      Social History     Socioeconomic History   • Marital status: Single     Spouse name: Not on file   • Number of children: Not on file   • Years of education: Not on file   • Highest education level: Not on file   Tobacco Use   • Smoking status: Never Smoker   • Smokeless tobacco: Never Used   Substance and Sexual Activity   • Alcohol use: No     Frequency: Never   • Drug use: No     Social History     Tobacco Use   Smoking Status Never Smoker   Smokeless Tobacco Never Used       family history includes Cancer in his father and mother; Diabetes in his maternal grandfather, maternal grandmother, and paternal uncle; Hypertension in his father and mother; Stroke in his father, maternal grandfather, and other.  Current Outpatient Medications on File Prior to Visit   Medication Sig Dispense Refill   • ALLERGY SERUM INJECTION Inject  under the skin into the appropriate area as directed 1 (One) Time.     • cholecalciferol (VITAMIN D3) 1000 units tablet Take 1,000 Units by mouth Daily.     • dicyclomine (BENTYL) 20 MG tablet Take 20 mg by mouth As Needed.     • fluticasone (FLONASE) 50 MCG/ACT nasal spray 2 sprays into the nostril(s) as directed by provider Daily. 2 sprays per nostril. ( Abstracted from [facility]. )     • lisinopril (PRINIVIL,ZESTRIL) 20 MG tablet Take 20 mg by mouth Daily.     • metoprolol succinate XL (TOPROL-XL) 25 MG 24 hr tablet Take 25 mg by mouth Daily.     • nortriptyline (PAMELOR) 50 MG capsule Take 50 mg by mouth Every Night.     • ondansetron (ZOFRAN) 4 MG tablet Take 4 mg by mouth Every 6 (Six) Hours As Needed for Nausea or Vomiting (take one tablet by mouth every 6 hours as needed for nausea ( Abstracted from [facility]. )).     • PATIENT SUPPLIED ALLERGY INJECTION Inject  under the skin into the appropriate area as directed 1 (One) Time. 1X per week. ( Abstracted from [facility].)     • vitamin B-12 (CYANOCOBALAMIN) 1000 MCG tablet Take 1,000 mcg  "by mouth Daily.     • [DISCONTINUED] Naproxen-Esomeprazole (VIMOVO) 500-20 MG tablet delayed-release Take  by mouth 2 (Two) Times a Day As Needed (take one tablet by mouth twice day PRN ( [facility] )).       No current facility-administered medications on file prior to visit.      Patient Active Problem List   Diagnosis   • Neck strain, initial encounter   • Abdominal pain, right lower quadrant   • Anxiety disorder   • Autistic disorder   • Carpal tunnel syndrome   • Constipation   • Depression   • Dyslexia   • Elevated rheumatoid factor   • Hypertension   • Near syncope   • IBS (irritable bowel syndrome)   • Low back pain   • Encounter for general adult medical examination without abnormal findings   • Vitamin D deficiency       The following portions of the patient's history were reviewed and updated as appropriate: allergies, current medications, past family history, past medical history, past social history, past surgical history and problem list.    Review of Systems   Constitutional: Negative for chills and fever.   HENT: Negative for sinus pressure, sore throat and trouble swallowing.    Eyes: Negative for blurred vision.   Respiratory: Negative for cough and shortness of breath.    Cardiovascular: Negative for chest pain, palpitations and syncope.   Gastrointestinal: Negative for abdominal pain.   Endocrine: Negative for polyuria.   Musculoskeletal: Positive for neck pain.   Skin: Negative for rash.   Neurological: Negative for dizziness and headache.   Hematological: Negative for adenopathy.   Psychiatric/Behavioral: Negative for depressed mood.       Objective   /88 (BP Location: Right arm, Patient Position: Sitting, Cuff Size: Adult)   Pulse 91   Temp 97 °F (36.1 °C) (Oral)   Ht 186.7 cm (73.5\")   Wt 98 kg (216 lb)   SpO2 99%   BMI 28.11 kg/m²   Physical Exam   Constitutional: He appears well-developed and well-nourished.   HENT:   Head: Normocephalic.   Eyes: Pupils are " equal, round, and reactive to light.   Neck: Normal range of motion. Neck supple.   Cardiovascular: Normal rate and regular rhythm.   Pulmonary/Chest: Effort normal.       No visits with results within 1 Week(s) from this visit.   Latest known visit with results is:   Hospital Outpatient Visit on 02/19/2019   Component Date Value Ref Range Status   • WBC 02/19/2019 8.5  4.5 - 11.5 10*3/uL Final   • RBC 02/19/2019 5.33  4.60 - 6.00 10*6/uL Final   • Hemoglobin 02/19/2019 16.5  14.0 - 18.0 g/dL Final   • Hematocrit 02/19/2019 48.7  40 - 54 % Final   • MCV 02/19/2019 91.4  80 - 94 fL Final   • MCH 02/19/2019 30.9  26 - 32 pg Final   • MCHC 02/19/2019 33.8  32 - 36 g/dL Final   • RDW 02/19/2019 12.7  11.5 - 14.5 % Final   • Platelets 02/19/2019 395  150 - 450 10*3/uL Final   • MPV 02/19/2019 7.7  7.4 - 10.4 fL Final   • Differential Type 02/19/2019 AUTO   Final   • Neutrophils Absolute 02/19/2019 5.4  2.3 - 8.6 10*3/uL Final   • Lymphocytes Absolute 02/19/2019 1.9  0.8 - 4.8 10*3/uL Final   • Monocytes Absolute 02/19/2019 0.8  0.1 - 1.3 10*3/uL Final   • Eosinophils Absolute 02/19/2019 0.2  0.0 - 0.3 10*3/uL Final   • Basophils Absolute 02/19/2019 0.1  0 - 0.2 10*3/uL Final   • Neutrophil Rel % 02/19/2019 64  50 - 75 % Final   • Lymphocyte Rel % 02/19/2019 23  18 - 42 % Final   • Monocyte Rel % 02/19/2019 10  2 - 11 % Final   • Eosinophil Rel % 02/19/2019 2  0 - 3 % Final   • Basophil Rel % 02/19/2019 1  0 - 2 % Final   • nRBC 02/19/2019 0  0 /100[WBCs] Final   • Absolute nRBC 02/19/2019 0  10*3/uL Final   • Sodium 02/19/2019 133* 136 - 144 mmol/L Final   • Potassium 02/19/2019 4.2  3.6 - 5.1 mmol/L Final   • Chloride 02/19/2019 95* 101 - 111 mmol/L Final   • CO2 02/19/2019 27  22 - 32 mmol/L Final   • Glucose 02/19/2019 88  65 - 99 mg/dL Final   • BUN 02/19/2019 10  8 - 20 mg/dL Final   • Creatinine 02/19/2019 0.8  0.7 - 1.2 mg/dl Final   • Calcium 02/19/2019 9.3  8.9 - 10.3 mg/dL Final   • Total Protein 02/19/2019 7.6   6.1 - 7.9 g/dL Final   • Albumin 02/19/2019 4.7  3.5 - 4.8 g/dL Final   • Total Bilirubin 02/19/2019 0.9  0.3 - 1.2 mg/dL Final   • Alkaline Phosphatase 02/19/2019 71  32 - 91 IU/L Final   • AST (SGOT) 02/19/2019 24  15 - 41 IU/L Final   • ALT (SGPT) 02/19/2019 38  17 - 63 IU/L Final   • Anion Gap 02/19/2019 15.2  10 - 20 Final   • BUN/Creatinine Ratio 02/19/2019 12.5  6.2 - 20.3 Final   • GFR MDRD Non  02/19/2019 >60  >60 mL/min/1.73m2 Final   • GFR MDRD  02/19/2019 >60  >60 mL/min/1.73m2 Final   • Globulin 02/19/2019 2.9  2.5 - 3.8 G/dL Final   • A/G Ratio 02/19/2019 1.6  1.0 - 1.7 Final           Assessment/Plan   Problems Addressed this Visit        Musculoskeletal and Integument    Neck strain, initial encounter - Primary     NSAIDs as prescribed.  Continue with home exercise plan as previously outlined by physical therapy.               Davian was seen today for neck pain and nausea.    Diagnoses and all orders for this visit:    Neck strain, initial encounter    Other orders  -     naproxen (NAPROSYN) 500 MG tablet; Take 1 tablet by mouth 2 (Two) Times a Day With Meals.

## 2019-07-05 NOTE — ASSESSMENT & PLAN NOTE
NSAIDs as prescribed.  Continue with home exercise plan as previously outlined by physical therapy.

## 2019-07-05 NOTE — PATIENT INSTRUCTIONS
Muscle Strain  A muscle strain is an injury that happens when a muscle is stretched longer than normal. This can happen during a fall, sports, or lifting. This can tear some muscle fibers. Usually, recovery from muscle strain takes 1-2 weeks. Complete healing normally takes 5-6 weeks.  This condition is first treated with PRICE therapy. This involves:  · Protecting your muscle from being injured again.  · Resting your injured muscle.  · Icing your injured muscle.  · Applying pressure (compression) to your injured muscle. This may be done with a splint or elastic bandage.  · Raising (elevating) your injured muscle.    Your doctor may also recommend medicine for pain.  Follow these instructions at home:  If you have a splint:  · Wear the splint as told by your doctor. Take it off only as told by your doctor.  · Loosen the splint if your fingers or toes tingle, get numb, or turn cold and blue.  · Keep the splint clean.  · If the splint is not waterproof:  ? Do not let it get wet.  ? Cover it with a watertight covering when you take a bath or a shower.  Managing pain, stiffness, and swelling  · If directed, put ice on your injured area.  ? If you have a removable splint, take it off as told by your doctor.  ? Put ice in a plastic bag.  ? Place a towel between your skin and the bag.  ? Leave the ice on for 20 minutes, 2-3 times a day.  · Move your fingers or toes often. This helps to avoid stiffness and lessen swelling.  · Raise your injured area above the level of your heart while you are sitting or lying down.  · Wear an elastic bandage as told by your doctor. Make sure it is not too tight.  General instructions  · Take over-the-counter and prescription medicines only as told by your doctor.  · Limit your activity. Rest your injured muscle as told by your doctor. Your doctor may say that gentle movements are okay.  · If physical therapy was prescribed, do exercises as told by your doctor.  · Do not put pressure on any  part of the splint until it is fully hardened. This may take many hours.  · Do not use any products that contain nicotine or tobacco, such as cigarettes and e-cigarettes. These can delay bone healing. If you need help quitting, ask your doctor.  · Warm up before you exercise. This helps to prevent more muscle strains.  · Ask your doctor when it is safe to drive if you have a splint.  · Keep all follow-up visits as told by your doctor. This is important.  Contact a doctor if:  · You have more pain or swelling in your injured area.  Get help right away if:  · You have any of these problems in your injured area:  ? You have numbness.  ? You have tingling.  ? You lose a lot of strength.  Summary  · A muscle strain is an injury that happens when a muscle is stretched longer than normal.  · This condition is first treated with PRICE therapy. This includes protecting, resting, icing, adding pressure, and raising your injury.  · Limit your activity. Rest your injured muscle as told by your doctor. Your doctor may say that gentle movements are okay.  · Warm up before you exercise. This helps to prevent more muscle strains.  This information is not intended to replace advice given to you by your health care provider. Make sure you discuss any questions you have with your health care provider.  Document Released: 09/26/2009 Document Revised: 01/24/2018 Document Reviewed: 01/24/2018  [employer] Interactive Patient Education © 2019 [employer]

## 2019-07-16 ENCOUNTER — OFFICE (OUTPATIENT)
Dept: URBAN - METROPOLITAN AREA CLINIC 64 | Facility: CLINIC | Age: 34
End: 2019-07-16
Payer: COMMERCIAL

## 2019-07-16 VITALS
HEART RATE: 88 BPM | DIASTOLIC BLOOD PRESSURE: 92 MMHG | HEIGHT: 72 IN | WEIGHT: 219 LBS | SYSTOLIC BLOOD PRESSURE: 130 MMHG

## 2019-07-16 DIAGNOSIS — K21.9 GASTRO-ESOPHAGEAL REFLUX DISEASE WITHOUT ESOPHAGITIS: ICD-10-CM

## 2019-07-16 DIAGNOSIS — K58.9 IRRITABLE BOWEL SYNDROME WITHOUT DIARRHEA: ICD-10-CM

## 2019-07-16 PROCEDURE — 99213 OFFICE O/P EST LOW 20 MIN: CPT | Performed by: INTERNAL MEDICINE

## 2019-07-16 RX ORDER — ESOMEPRAZOLE MAGNESIUM 40 MG/1
40 CAPSULE, DELAYED RELEASE ORAL
Qty: 30 | Refills: 11 | Status: COMPLETED
Start: 2019-04-16 | End: 2020-11-02

## 2019-09-06 ENCOUNTER — OFFICE VISIT (OUTPATIENT)
Dept: FAMILY MEDICINE CLINIC | Facility: CLINIC | Age: 34
End: 2019-09-06

## 2019-09-06 VITALS
BODY MASS INDEX: 28.63 KG/M2 | SYSTOLIC BLOOD PRESSURE: 114 MMHG | HEART RATE: 103 BPM | WEIGHT: 220 LBS | TEMPERATURE: 97.2 F | OXYGEN SATURATION: 98 % | DIASTOLIC BLOOD PRESSURE: 77 MMHG

## 2019-09-06 DIAGNOSIS — R00.0 TACHYCARDIA: ICD-10-CM

## 2019-09-06 DIAGNOSIS — I10 HYPERTENSION, UNSPECIFIED TYPE: Primary | ICD-10-CM

## 2019-09-06 PROCEDURE — 99213 OFFICE O/P EST LOW 20 MIN: CPT | Performed by: FAMILY MEDICINE

## 2019-09-06 RX ORDER — ATENOLOL 25 MG/1
25 TABLET ORAL NIGHTLY
Qty: 90 TABLET | Refills: 1 | Status: SHIPPED | OUTPATIENT
Start: 2019-09-06 | End: 2020-02-19

## 2019-09-07 NOTE — PROGRESS NOTES
Subjective   Chief Complaint   Patient presents with   • Rapid Heart Rate     x 3 - 4 mos     Davian Arevalo is a 34 y.o. male.     Hypertension   This is a chronic problem. The current episode started more than 1 year ago. The problem has been waxing and waning since onset. The problem is controlled. Associated symptoms include anxiety. Pertinent negatives include no blurred vision, chest pain, neck pain, palpitations, peripheral edema or shortness of breath. Current antihypertension treatment includes beta blockers and ACE inhibitors. The current treatment provides moderate improvement.      Past Medical History:   Diagnosis Date   • Abdominal pain, chronic, right lower quadrant 01/30/2015   • Anxiety    • Autism 08/13/2014    mild ( [facility]ed from [facility]   • Carpal tunnel syndrome 02/22/2018   • Chest pain 02/23/2018   • Constipation 09/21/2018   • Depression    • Dermatitis 01/07/2019   • Dyslexia 12/26/2017   • Fatigue 08/20/2015   • Hypertension    • IBS (irritable bowel syndrome) 09/21/2018   • Joint pain 12/26/2017   • Knee pain, left 07/25/2017   • Lower back pain 01/30/2015   • Near syncope 02/19/2019   • Neck pain on left side 11/13/2018   • Numbness and tingling of right arm 12/26/2017   • Pain in joint of right wrist 02/22/2018   • Rheumatoid factor positive 05/06/2019   • Right cervical radiculopathy 01/07/2019   • Shoulder pain, right 02/14/2017   • Tachycardia 02/19/2019   • Visit for preventive health examination 08/13/2014   • Vitamin D deficiency 12/26/2017     Past Surgical History:   Procedure Laterality Date   • CHOLECYSTECTOMY     • TONSILLECTOMY  1993    Tonsillectomy and Adenoids-     Allergies   Allergen Reactions   • Amoxicillin Unknown (See Comments)     Abstracted from [facility].    • Ceclor [Cefaclor] Unknown (See Comments)     Abstracted from [facility].    • Cephalexin Unknown (See Comments)     [facility] from [facility].      Social History     Socioeconomic History   •  Marital status: Single     Spouse name: Not on file   • Number of children: Not on file   • Years of education: Not on file   • Highest education level: Not on file   Tobacco Use   • Smoking status: Never Smoker   • Smokeless tobacco: Never Used   Substance and Sexual Activity   • Alcohol use: No     Frequency: Never   • Drug use: No     Social History     Tobacco Use   Smoking Status Never Smoker   Smokeless Tobacco Never Used       family history includes Atrial fibrillation in his mother; Cancer in his father and mother; Diabetes in his maternal grandfather, maternal grandmother, and paternal uncle; Hypertension in his father and mother; Stroke in his father, maternal grandfather, and other.  Current Outpatient Medications on File Prior to Visit   Medication Sig Dispense Refill   • OMEPRAZOLE PO Take  by mouth.     • ALLERGY SERUM INJECTION Inject  under the skin into the appropriate area as directed 1 (One) Time.     • cholecalciferol (VITAMIN D3) 1000 units tablet Take 1,000 Units by mouth Daily.     • dicyclomine (BENTYL) 20 MG tablet Take 20 mg by mouth As Needed.     • fluticasone (FLONASE) 50 MCG/ACT nasal spray 2 sprays into the nostril(s) as directed by provider Daily. 2 sprays per nostril. ( Abstracted from [facility]. )     • lisinopril (PRINIVIL,ZESTRIL) 20 MG tablet Take 20 mg by mouth Daily.     • naproxen (NAPROSYN) 500 MG tablet Take 1 tablet by mouth 2 (Two) Times a Day With Meals. 60 tablet 2   • nortriptyline (PAMELOR) 50 MG capsule Take 50 mg by mouth Every Night.     • ondansetron (ZOFRAN) 4 MG tablet Take 4 mg by mouth Every 6 (Six) Hours As Needed for Nausea or Vomiting (take one tablet by mouth every 6 hours as needed for nausea ( Abstracted from [facility]. )).     • vitamin B-12 (CYANOCOBALAMIN) 1000 MCG tablet Take 1,000 mcg by mouth Daily.       No current facility-administered medications on file prior to visit.      Patient Active Problem List   Diagnosis   • Neck strain, initial  encounter   • Abdominal pain, right lower quadrant   • Anxiety disorder   • Autistic disorder   • Carpal tunnel syndrome   • Constipation   • Depression   • Dyslexia   • Elevated rheumatoid factor   • Hypertension   • Near syncope   • IBS (irritable bowel syndrome)   • Low back pain   • Encounter for general adult medical examination without abnormal findings   • Vitamin D deficiency   • Tachycardia       The following portions of the patient's history were reviewed and updated as appropriate: allergies, current medications, past family history, past medical history, past social history, past surgical history and problem list.    Review of Systems   Constitutional: Negative for chills and fever.   HENT: Negative for sinus pressure and sore throat.    Eyes: Negative for blurred vision.   Respiratory: Negative for cough and shortness of breath.    Cardiovascular: Negative for chest pain and palpitations.   Gastrointestinal: Negative for abdominal pain.   Endocrine: Negative for polyuria.   Musculoskeletal: Negative for neck pain.   Skin: Negative for rash.   Neurological: Negative for dizziness and headache.   Hematological: Negative for adenopathy.   Psychiatric/Behavioral: Negative for depressed mood.       Objective   /77 (BP Location: Left arm, Patient Position: Sitting, Cuff Size: Adult)   Pulse 103   Temp 97.2 °F (36.2 °C) (Oral)   Wt 99.8 kg (220 lb)   SpO2 98%   BMI 28.63 kg/m²   Physical Exam   Constitutional: He is oriented to person, place, and time. He appears well-developed. No distress.   HENT:   Head: Normocephalic.   Eyes: Conjunctivae and lids are normal.   Neck: Trachea normal. No thyroid mass and no thyromegaly present.   Cardiovascular: Normal rate, regular rhythm and normal heart sounds.   Pulmonary/Chest: Effort normal and breath sounds normal.   Lymphadenopathy:     He has no cervical adenopathy.   Neurological: He is alert and oriented to person, place, and time.   Skin: Skin is warm  and dry.   Psychiatric: He has a normal mood and affect. His speech is normal and behavior is normal. He is attentive.       No visits with results within 1 Week(s) from this visit.   Latest known visit with results is:   Hospital Outpatient Visit on 02/19/2019   Component Date Value Ref Range Status   • WBC 02/19/2019 8.5  4.5 - 11.5 10*3/uL Final   • RBC 02/19/2019 5.33  4.60 - 6.00 10*6/uL Final   • Hemoglobin 02/19/2019 16.5  14.0 - 18.0 g/dL Final   • Hematocrit 02/19/2019 48.7  40 - 54 % Final   • MCV 02/19/2019 91.4  80 - 94 fL Final   • MCH 02/19/2019 30.9  26 - 32 pg Final   • MCHC 02/19/2019 33.8  32 - 36 g/dL Final   • RDW 02/19/2019 12.7  11.5 - 14.5 % Final   • Platelets 02/19/2019 395  150 - 450 10*3/uL Final   • MPV 02/19/2019 7.7  7.4 - 10.4 fL Final   • Differential Type 02/19/2019 AUTO   Final   • Neutrophils Absolute 02/19/2019 5.4  2.3 - 8.6 10*3/uL Final   • Lymphocytes Absolute 02/19/2019 1.9  0.8 - 4.8 10*3/uL Final   • Monocytes Absolute 02/19/2019 0.8  0.1 - 1.3 10*3/uL Final   • Eosinophils Absolute 02/19/2019 0.2  0.0 - 0.3 10*3/uL Final   • Basophils Absolute 02/19/2019 0.1  0 - 0.2 10*3/uL Final   • Neutrophil Rel % 02/19/2019 64  50 - 75 % Final   • Lymphocyte Rel % 02/19/2019 23  18 - 42 % Final   • Monocyte Rel % 02/19/2019 10  2 - 11 % Final   • Eosinophil Rel % 02/19/2019 2  0 - 3 % Final   • Basophil Rel % 02/19/2019 1  0 - 2 % Final   • nRBC 02/19/2019 0  0 /100[WBCs] Final   • Absolute nRBC 02/19/2019 0  10*3/uL Final   • Sodium 02/19/2019 133* 136 - 144 mmol/L Final   • Potassium 02/19/2019 4.2  3.6 - 5.1 mmol/L Final   • Chloride 02/19/2019 95* 101 - 111 mmol/L Final   • CO2 02/19/2019 27  22 - 32 mmol/L Final   • Glucose 02/19/2019 88  65 - 99 mg/dL Final   • BUN 02/19/2019 10  8 - 20 mg/dL Final   • Creatinine 02/19/2019 0.8  0.7 - 1.2 mg/dl Final   • Calcium 02/19/2019 9.3  8.9 - 10.3 mg/dL Final   • Total Protein 02/19/2019 7.6  6.1 - 7.9 g/dL Final   • Albumin 02/19/2019  4.7  3.5 - 4.8 g/dL Final   • Total Bilirubin 02/19/2019 0.9  0.3 - 1.2 mg/dL Final   • Alkaline Phosphatase 02/19/2019 71  32 - 91 IU/L Final   • AST (SGOT) 02/19/2019 24  15 - 41 IU/L Final   • ALT (SGPT) 02/19/2019 38  17 - 63 IU/L Final   • Anion Gap 02/19/2019 15.2  10 - 20 Final   • BUN/Creatinine Ratio 02/19/2019 12.5  6.2 - 20.3 Final   • GFR MDRD Non  02/19/2019 >60  >60 mL/min/1.73m2 Final   • GFR MDRD  02/19/2019 >60  >60 mL/min/1.73m2 Final   • Globulin 02/19/2019 2.9  2.5 - 3.8 G/dL Final   • A/G Ratio 02/19/2019 1.6  1.0 - 1.7 Final           Assessment/Plan   Problems Addressed this Visit        Cardiovascular and Mediastinum    Hypertension - Primary    Relevant Medications    atenolol (TENORMIN) 25 MG tablet    Tachycardia          Davian was seen today for rapid heart rate.    Diagnoses and all orders for this visit:    Hypertension, unspecified type    Tachycardia    Other orders  -     atenolol (TENORMIN) 25 MG tablet; Take 1 tablet by mouth Every Night.

## 2019-09-09 ENCOUNTER — OFFICE (OUTPATIENT)
Dept: URBAN - METROPOLITAN AREA CLINIC 64 | Facility: CLINIC | Age: 34
End: 2019-09-09

## 2019-09-30 RX ORDER — LISINOPRIL 20 MG/1
TABLET ORAL
Qty: 90 TABLET | Refills: 2 | Status: SHIPPED | OUTPATIENT
Start: 2019-09-30 | End: 2020-08-06

## 2019-12-02 ENCOUNTER — OFFICE VISIT (OUTPATIENT)
Dept: FAMILY MEDICINE CLINIC | Facility: CLINIC | Age: 34
End: 2019-12-02

## 2019-12-02 VITALS
WEIGHT: 232 LBS | OXYGEN SATURATION: 97 % | HEART RATE: 75 BPM | SYSTOLIC BLOOD PRESSURE: 120 MMHG | BODY MASS INDEX: 30.19 KG/M2 | TEMPERATURE: 97.8 F | DIASTOLIC BLOOD PRESSURE: 82 MMHG

## 2019-12-02 DIAGNOSIS — M25.562 ACUTE PAIN OF LEFT KNEE: ICD-10-CM

## 2019-12-02 DIAGNOSIS — E55.9 VITAMIN D DEFICIENCY: ICD-10-CM

## 2019-12-02 DIAGNOSIS — R53.83 FATIGUE, UNSPECIFIED TYPE: Primary | ICD-10-CM

## 2019-12-02 DIAGNOSIS — E53.8 VITAMIN B12 DEFICIENCY: ICD-10-CM

## 2019-12-02 LAB
25(OH)D3 SERPL-MCNC: 42.1 NG/ML (ref 30–100)
ALBUMIN SERPL-MCNC: 4.6 G/DL (ref 3.5–5.2)
ALBUMIN/GLOB SERPL: 1.4 G/DL
ALP SERPL-CCNC: 86 U/L (ref 39–117)
ALT SERPL W P-5'-P-CCNC: 41 U/L (ref 1–41)
ANION GAP SERPL CALCULATED.3IONS-SCNC: 12.9 MMOL/L (ref 5–15)
AST SERPL-CCNC: 22 U/L (ref 1–40)
BASOPHILS # BLD AUTO: 0.07 10*3/MM3 (ref 0–0.2)
BASOPHILS NFR BLD AUTO: 0.8 % (ref 0–1.5)
BILIRUB SERPL-MCNC: 0.4 MG/DL (ref 0.2–1.2)
BUN BLD-MCNC: 16 MG/DL (ref 6–20)
BUN/CREAT SERPL: 14.7 (ref 7–25)
CALCIUM SPEC-SCNC: 10 MG/DL (ref 8.6–10.5)
CHLORIDE SERPL-SCNC: 98 MMOL/L (ref 98–107)
CO2 SERPL-SCNC: 29.1 MMOL/L (ref 22–29)
CREAT BLD-MCNC: 1.09 MG/DL (ref 0.76–1.27)
DEPRECATED RDW RBC AUTO: 41.2 FL (ref 37–54)
EOSINOPHIL # BLD AUTO: 0.36 10*3/MM3 (ref 0–0.4)
EOSINOPHIL NFR BLD AUTO: 4 % (ref 0.3–6.2)
ERYTHROCYTE [DISTWIDTH] IN BLOOD BY AUTOMATED COUNT: 12.7 % (ref 12.3–15.4)
GFR SERPL CREATININE-BSD FRML MDRD: 77 ML/MIN/1.73
GLOBULIN UR ELPH-MCNC: 3.4 GM/DL
GLUCOSE BLD-MCNC: 101 MG/DL (ref 65–99)
HCT VFR BLD AUTO: 48.1 % (ref 37.5–51)
HGB BLD-MCNC: 15.9 G/DL (ref 13–17.7)
IMM GRANULOCYTES # BLD AUTO: 0.05 10*3/MM3 (ref 0–0.05)
IMM GRANULOCYTES NFR BLD AUTO: 0.6 % (ref 0–0.5)
LYMPHOCYTES # BLD AUTO: 2.15 10*3/MM3 (ref 0.7–3.1)
LYMPHOCYTES NFR BLD AUTO: 24 % (ref 19.6–45.3)
MCH RBC QN AUTO: 29.2 PG (ref 26.6–33)
MCHC RBC AUTO-ENTMCNC: 33.1 G/DL (ref 31.5–35.7)
MCV RBC AUTO: 88.3 FL (ref 79–97)
MONOCYTES # BLD AUTO: 0.95 10*3/MM3 (ref 0.1–0.9)
MONOCYTES NFR BLD AUTO: 10.6 % (ref 5–12)
NEUTROPHILS # BLD AUTO: 5.39 10*3/MM3 (ref 1.7–7)
NEUTROPHILS NFR BLD AUTO: 60 % (ref 42.7–76)
NRBC BLD AUTO-RTO: 0 /100 WBC (ref 0–0.2)
PLATELET # BLD AUTO: 394 10*3/MM3 (ref 140–450)
PMV BLD AUTO: 9.3 FL (ref 6–12)
POTASSIUM BLD-SCNC: 4.5 MMOL/L (ref 3.5–5.2)
PROT SERPL-MCNC: 8 G/DL (ref 6–8.5)
RBC # BLD AUTO: 5.45 10*6/MM3 (ref 4.14–5.8)
SODIUM BLD-SCNC: 140 MMOL/L (ref 136–145)
TSH SERPL DL<=0.05 MIU/L-ACNC: 2.17 UIU/ML (ref 0.27–4.2)
VIT B12 BLD-MCNC: 660 PG/ML (ref 211–946)
WBC NRBC COR # BLD: 8.97 10*3/MM3 (ref 3.4–10.8)

## 2019-12-02 PROCEDURE — 82306 VITAMIN D 25 HYDROXY: CPT | Performed by: FAMILY MEDICINE

## 2019-12-02 PROCEDURE — 82607 VITAMIN B-12: CPT | Performed by: FAMILY MEDICINE

## 2019-12-02 PROCEDURE — 85025 COMPLETE CBC W/AUTO DIFF WBC: CPT | Performed by: FAMILY MEDICINE

## 2019-12-02 PROCEDURE — 84443 ASSAY THYROID STIM HORMONE: CPT | Performed by: FAMILY MEDICINE

## 2019-12-02 PROCEDURE — 36415 COLL VENOUS BLD VENIPUNCTURE: CPT | Performed by: FAMILY MEDICINE

## 2019-12-02 PROCEDURE — 80053 COMPREHEN METABOLIC PANEL: CPT | Performed by: FAMILY MEDICINE

## 2019-12-02 PROCEDURE — 99214 OFFICE O/P EST MOD 30 MIN: CPT | Performed by: FAMILY MEDICINE

## 2019-12-02 RX ORDER — ESOMEPRAZOLE MAGNESIUM 40 MG/1
CAPSULE, DELAYED RELEASE ORAL
COMMUNITY
Start: 2019-11-17 | End: 2020-08-05

## 2019-12-02 NOTE — PROGRESS NOTES
Subjective   Chief Complaint   Patient presents with   • Fatigue     Davian Arevalo is a 34 y.o. male.     He sleeps well at night.  Usually sleep 6-7 hours at night.  Does not snore that he knows of.  Increased stress recently - his father is in a nursing home so patient is living alone.        Fatigue   This is a new problem. The current episode started 1 to 4 weeks ago. The problem occurs daily. The problem has been unchanged. Associated symptoms include arthralgias, chest pain, fatigue and nausea. Pertinent negatives include no abdominal pain, anorexia, chills, congestion, coughing, diaphoresis, fever, joint swelling, numbness, rash, sore throat or weakness. Nothing aggravates the symptoms. He has tried rest for the symptoms. The treatment provided no relief.   Knee Pain    The incident occurred more than 1 week ago. There was no injury mechanism. The pain is present in the left knee. The quality of the pain is described as aching. The pain is moderate. The pain has been fluctuating since onset. Pertinent negatives include no inability to bear weight, loss of motion, loss of sensation, muscle weakness, numbness or tingling. He reports no foreign bodies present. The symptoms are aggravated by movement. He has tried rest and heat for the symptoms. The treatment provided mild relief.      Past Medical History:   Diagnosis Date   • Abdominal pain, chronic, right lower quadrant 01/30/2015   • Anxiety    • Autism 08/13/2014    mild ( [facility]   • Carpal tunnel syndrome 02/22/2018   • Chest pain 02/23/2018   • Constipation 09/21/2018   • Depression    • Dermatitis 01/07/2019   • Dyslexia 12/26/2017   • Fatigue 08/20/2015   • Hypertension    • IBS (irritable bowel syndrome) 09/21/2018   • Joint pain 12/26/2017   • Knee pain, left 07/25/2017   • Lower back pain 01/30/2015   • Near syncope 02/19/2019   • Neck pain on left side 11/13/2018   • Numbness and tingling of right arm 12/26/2017   • Pain in  joint of right wrist 02/22/2018   • Rheumatoid factor positive 05/06/2019   • Right cervical radiculopathy 01/07/2019   • Shoulder pain, right 02/14/2017   • Tachycardia 02/19/2019   • Visit for preventive health examination 08/13/2014   • Vitamin D deficiency 12/26/2017     Past Surgical History:   Procedure Laterality Date   • CHOLECYSTECTOMY     • TONSILLECTOMY  1993    Tonsillectomy and Adenoids-     Allergies   Allergen Reactions   • Amoxicillin Unknown (See Comments)     Abstracted from [facility].    • Ceclor [Cefaclor] Unknown (See Comments)     Abstracted from [facility].    • Cephalexin Unknown (See Comments)     Abstracted from [facility].      Social History     Socioeconomic History   • Marital status: Single     Spouse name: Not on file   • Number of children: Not on file   • Years of education: Not on file   • Highest education level: Not on file   Tobacco Use   • Smoking status: Never Smoker   • Smokeless tobacco: Never Used   Substance and Sexual Activity   • Alcohol use: No     Frequency: Never   • Drug use: No     Social History     Tobacco Use   Smoking Status Never Smoker   Smokeless Tobacco Never Used       family history includes Atrial fibrillation in his mother; Cancer in his father and mother; Diabetes in his maternal grandfather, maternal grandmother, and paternal uncle; Hypertension in his father and mother; Stroke in his father, maternal grandfather, and another family member.  Current Outpatient Medications on File Prior to Visit   Medication Sig Dispense Refill   • esomeprazole (nexIUM) 40 MG capsule      • ALLERGY SERUM INJECTION Inject  under the skin into the appropriate area as directed 1 (One) Time.     • atenolol (TENORMIN) 25 MG tablet Take 1 tablet by mouth Every Night. 90 tablet 1   • cholecalciferol (VITAMIN D3) 1000 units tablet Take 1,000 Units by mouth Daily.     • dicyclomine (BENTYL) 20 MG tablet Take 20 mg by mouth As Needed.     • fluticasone (FLONASE) 50 MCG/ACT nasal  spray 2 sprays into the nostril(s) as directed by provider Daily. 2 sprays per nostril. ( Abstracted from [facility] )     • lisinopril (PRINIVIL,ZESTRIL) 20 MG tablet TAKE ONE TABLET BY MOUTH DAILY 90 tablet 2   • naproxen (NAPROSYN) 500 MG tablet Take 1 tablet by mouth 2 (Two) Times a Day With Meals. 60 tablet 2   • nortriptyline (PAMELOR) 50 MG capsule Take 50 mg by mouth Every Night.     • vitamin B-12 (CYANOCOBALAMIN) 1000 MCG tablet Take 1,000 mcg by mouth Daily.     • [DISCONTINUED] OMEPRAZOLE PO Take  by mouth.     • [DISCONTINUED] ondansetron (ZOFRAN) 4 MG tablet Take 4 mg by mouth Every 6 (Six) Hours As Needed for Nausea or Vomiting (take one tablet by mouth every 6 hours as needed for nausea ( Abstracted from [facility] )).       No current facility-administered medications on file prior to visit.      Patient Active Problem List   Diagnosis   • Neck strain, initial encounter   • Abdominal pain, right lower quadrant   • Anxiety disorder   • Autistic disorder   • Carpal tunnel syndrome   • Constipation   • Depression   • Dyslexia   • Elevated rheumatoid factor   • Hypertension   • Near syncope   • IBS (irritable bowel syndrome)   • Low back pain   • Encounter for general adult medical examination without abnormal findings   • Vitamin D deficiency   • Tachycardia   • Vitamin B12 deficiency   • Fatigue   • Acute pain of left knee       The following portions of the patient's history were reviewed and updated as appropriate: allergies, current medications, past family history, past medical history, past social history, past surgical history and problem list.    Review of Systems   Constitutional: Positive for fatigue. Negative for chills, diaphoresis and fever.   HENT: Negative for congestion, sinus pressure and sore throat.    Eyes: Negative for blurred vision.   Respiratory: Negative for cough and shortness of breath.    Cardiovascular: Positive for chest pain. Negative for palpitations.    Gastrointestinal: Positive for nausea. Negative for abdominal pain and anorexia.   Endocrine: Negative for polyuria.   Musculoskeletal: Positive for arthralgias. Negative for joint swelling.   Skin: Negative for rash.   Neurological: Negative for dizziness, tingling, weakness, numbness and headache.   Hematological: Negative for adenopathy.   Psychiatric/Behavioral: Negative for depressed mood.       Objective   /82 (BP Location: Right arm, Patient Position: Sitting, Cuff Size: Adult)   Pulse 75   Temp 97.8 °F (36.6 °C) (Oral)   Wt 105 kg (232 lb)   SpO2 97%   BMI 30.19 kg/m²   Physical Exam   Constitutional: He is oriented to person, place, and time. He appears well-developed. No distress.   HENT:   Head: Normocephalic.   Eyes: Conjunctivae and lids are normal.   Neck: Trachea normal. No thyroid mass and no thyromegaly present.   Cardiovascular: Normal rate, regular rhythm and normal heart sounds.   Pulmonary/Chest: Effort normal and breath sounds normal.   Musculoskeletal: He exhibits tenderness.        Left knee: He exhibits normal range of motion, no swelling and no effusion. Tenderness found. Patellar tendon tenderness noted.   Lymphadenopathy:     He has no cervical adenopathy.   Neurological: He is alert and oriented to person, place, and time.   Skin: Skin is warm and dry.   Psychiatric: He has a normal mood and affect. His speech is normal and behavior is normal. He is attentive.       No visits with results within 1 Week(s) from this visit.   Latest known visit with results is:   Hospital Outpatient Visit on 02/19/2019   Component Date Value Ref Range Status   • WBC 02/19/2019 8.5  4.5 - 11.5 10*3/uL Final   • RBC 02/19/2019 5.33  4.60 - 6.00 10*6/uL Final   • Hemoglobin 02/19/2019 16.5  14.0 - 18.0 g/dL Final   • Hematocrit 02/19/2019 48.7  40 - 54 % Final   • MCV 02/19/2019 91.4  80 - 94 fL Final   • MCH 02/19/2019 30.9  26 - 32 pg Final   • MCHC 02/19/2019 33.8  32 - 36 g/dL Final   • RDW  02/19/2019 12.7  11.5 - 14.5 % Final   • Platelets 02/19/2019 395  150 - 450 10*3/uL Final   • MPV 02/19/2019 7.7  7.4 - 10.4 fL Final   • Differential Type 02/19/2019 AUTO   Final   • Neutrophils Absolute 02/19/2019 5.4  2.3 - 8.6 10*3/uL Final   • Lymphocytes Absolute 02/19/2019 1.9  0.8 - 4.8 10*3/uL Final   • Monocytes Absolute 02/19/2019 0.8  0.1 - 1.3 10*3/uL Final   • Eosinophils Absolute 02/19/2019 0.2  0.0 - 0.3 10*3/uL Final   • Basophils Absolute 02/19/2019 0.1  0 - 0.2 10*3/uL Final   • Neutrophil Rel % 02/19/2019 64  50 - 75 % Final   • Lymphocyte Rel % 02/19/2019 23  18 - 42 % Final   • Monocyte Rel % 02/19/2019 10  2 - 11 % Final   • Eosinophil Rel % 02/19/2019 2  0 - 3 % Final   • Basophil Rel % 02/19/2019 1  0 - 2 % Final   • nRBC 02/19/2019 0  0 /100[WBCs] Final   • Absolute nRBC 02/19/2019 0  10*3/uL Final   • Sodium 02/19/2019 133* 136 - 144 mmol/L Final   • Potassium 02/19/2019 4.2  3.6 - 5.1 mmol/L Final   • Chloride 02/19/2019 95* 101 - 111 mmol/L Final   • CO2 02/19/2019 27  22 - 32 mmol/L Final   • Glucose 02/19/2019 88  65 - 99 mg/dL Final   • BUN 02/19/2019 10  8 - 20 mg/dL Final   • Creatinine 02/19/2019 0.8  0.7 - 1.2 mg/dl Final   • Calcium 02/19/2019 9.3  8.9 - 10.3 mg/dL Final   • Total Protein 02/19/2019 7.6  6.1 - 7.9 g/dL Final   • Albumin 02/19/2019 4.7  3.5 - 4.8 g/dL Final   • Total Bilirubin 02/19/2019 0.9  0.3 - 1.2 mg/dL Final   • Alkaline Phosphatase 02/19/2019 71  32 - 91 IU/L Final   • AST (SGOT) 02/19/2019 24  15 - 41 IU/L Final   • ALT (SGPT) 02/19/2019 38  17 - 63 IU/L Final   • Anion Gap 02/19/2019 15.2  10 - 20 Final   • BUN/Creatinine Ratio 02/19/2019 12.5  6.2 - 20.3 Final   • GFR MDRD Non  02/19/2019 >60  >60 mL/min/1.73m2 Final   • GFR MDRD  02/19/2019 >60  >60 mL/min/1.73m2 Final   • Globulin 02/19/2019 2.9  2.5 - 3.8 G/dL Final   • A/G Ratio 02/19/2019 1.6  1.0 - 1.7 Final           Assessment/Plan   Problems Addressed this Visit         Digestive    Vitamin D deficiency    Relevant Orders    Vitamin D 25 Hydroxy    Vitamin B12 deficiency       Musculoskeletal and Integument    Acute pain of left knee    Relevant Orders    Ambulatory Referral to Orthopedic Surgery       Other    Fatigue - Primary    Relevant Orders    CBC & Differential    Comprehensive Metabolic Panel    TSH    Vitamin B12          Davian was seen today for fatigue.    Diagnoses and all orders for this visit:    Fatigue, unspecified type  -     CBC & Differential  -     Comprehensive Metabolic Panel  -     TSH  -     Vitamin B12    Vitamin D deficiency  -     Vitamin D 25 Hydroxy    Vitamin B12 deficiency    Acute pain of left knee  -     Ambulatory Referral to Orthopedic Surgery

## 2020-01-08 ENCOUNTER — OFFICE VISIT (OUTPATIENT)
Dept: ORTHOPEDIC SURGERY | Facility: CLINIC | Age: 35
End: 2020-01-08

## 2020-01-08 VITALS
SYSTOLIC BLOOD PRESSURE: 124 MMHG | DIASTOLIC BLOOD PRESSURE: 84 MMHG | BODY MASS INDEX: 31.56 KG/M2 | WEIGHT: 233 LBS | HEIGHT: 72 IN | HEART RATE: 79 BPM

## 2020-01-08 DIAGNOSIS — M25.562 ACUTE PAIN OF LEFT KNEE: Primary | ICD-10-CM

## 2020-01-08 DIAGNOSIS — M17.12 PRIMARY LOCALIZED OSTEOARTHROSIS OF LEFT LOWER LEG: ICD-10-CM

## 2020-01-08 PROCEDURE — 99213 OFFICE O/P EST LOW 20 MIN: CPT | Performed by: FAMILY MEDICINE

## 2020-01-08 RX ORDER — ATENOLOL 25 MG/1
TABLET ORAL
COMMUNITY
Start: 2019-12-16 | End: 2020-02-19

## 2020-01-08 RX ORDER — DICYCLOMINE HCL 20 MG
TABLET ORAL
COMMUNITY
Start: 2019-12-16 | End: 2020-08-05

## 2020-01-08 RX ORDER — ESOMEPRAZOLE MAGNESIUM 40 MG/1
CAPSULE, DELAYED RELEASE ORAL
COMMUNITY
Start: 2019-12-15 | End: 2020-08-05

## 2020-01-08 RX ORDER — NORTRIPTYLINE HYDROCHLORIDE 10 MG/1
10 CAPSULE ORAL 2 TIMES DAILY
COMMUNITY
End: 2020-12-11

## 2020-01-08 RX ORDER — ERGOCALCIFEROL 1.25 MG/1
50000 CAPSULE ORAL WEEKLY
COMMUNITY
End: 2020-08-05

## 2020-01-08 RX ORDER — METHYLPREDNISOLONE 4 MG/1
TABLET ORAL
Qty: 21 TABLET | Refills: 0 | Status: SHIPPED | OUTPATIENT
Start: 2020-01-08 | End: 2020-08-05

## 2020-01-08 RX ORDER — ACETAMINOPHEN 325 MG/1
650 TABLET ORAL EVERY 6 HOURS PRN
COMMUNITY
End: 2020-12-14

## 2020-01-08 RX ORDER — LISINOPRIL 20 MG/1
TABLET ORAL
COMMUNITY
Start: 2019-12-20 | End: 2020-08-05

## 2020-01-08 NOTE — PROGRESS NOTES
"Primary Care Sports Medicine Office Visit Note     Patient ID: Davian Arevalo is a 34 y.o. male.    Chief Complaint:  Chief Complaint   Patient presents with   • Left Knee - Initial Evaluation     HPI:    Mr. Davian Arevalo is a 34 y.o. male who presents to the clinic today for L knee pain. Started about 2 years ago. Insidious onset. Worse on days that he is active/walking more. No injury, no fall, no trauma. No clicking or popping. No locking. No instability. Chronic overuse pressing a pedal while standing at work. He has tested positive for RA. Has never had any joint issues to this point.      Past Medical History:   Diagnosis Date   • Corneal dystrophy    • Dyslexia    • Hypertension    • IBS (irritable bowel syndrome)    • Neck pain    • Tinnitus        Past Surgical History:   Procedure Laterality Date   • TONSILLECTOMY AND ADENOIDECTOMY         Family History   Problem Relation Age of Onset   • Cancer Mother    • Cancer Father    • Hypertension Father    • Stroke Father    • Hyperlipidemia Father      Social History     Occupational History   • Not on file   Tobacco Use   • Smoking status: Never Smoker   • Smokeless tobacco: Never Used   Substance and Sexual Activity   • Alcohol use: Never     Frequency: Never   • Drug use: Not on file   • Sexual activity: Not on file      Review of Systems   Constitutional: Negative for activity change and fever.   Respiratory: Negative for cough and shortness of breath.    Cardiovascular: Negative for chest pain.   Gastrointestinal: Negative for constipation, diarrhea, nausea and vomiting.   Musculoskeletal: Positive for arthralgias.   Skin: Negative for color change and rash.   Neurological: Negative for weakness.   Hematological: Does not bruise/bleed easily.       Objective:    /84 (BP Location: Left arm, Patient Position: Sitting, Cuff Size: Adult)   Pulse 79   Ht 182.9 cm (72\")   Wt 106 kg (233 lb)   BMI 31.60 kg/m²     Physical Examination:  Physical Exam " "  Constitutional: He appears well-developed and well-nourished. No distress.   HENT:   Head: Normocephalic and atraumatic.   Eyes: Conjunctivae are normal.   Cardiovascular: Intact distal pulses.   Pulmonary/Chest: Effort normal. No respiratory distress.   Musculoskeletal:        Left knee: He exhibits no effusion.   Neurological: He is alert.   Skin: Skin is warm. Capillary refill takes less than 2 seconds. He is not diaphoretic.   Nursing note and vitals reviewed.    Left Ankle Exam     Range of Motion   The patient has normal left ankle ROM.       Left Knee Exam     Muscle Strength   The patient has normal left knee strength.    Tenderness   The patient is experiencing no tenderness.     Range of Motion   The patient has normal left knee ROM.  Extension: normal   Flexion: normal     Tests   Evonne:  Medial - negative Lateral - negative  Varus: negative Valgus: negative  Lachman:  Anterior - negative      Drawer:  Anterior - negative     Posterior - negative    Other   Erythema: absent  Effusion: no effusion present      Left Hip Exam     Range of Motion   The patient has normal left hip ROM.        Imaging and other tests:  3V XR of the L knee today. No obvious fracture, malalignment or dislocation.     Assessment and Plan:    1. Acute pain of left knee  - XR Knee 3 View Left    2. Primary localized osteoarthrosis of left lower leg  - methylPREDNISolone (MEDROL, VIJAY,) 4 MG tablet; Use as directed by package instructions  Dispense: 21 tablet; Refill: 0    I discussed with this patient today that this may be a manifestation of early rheumatoid arthritis, versus mild normal osteoarthritic flare/change.  Ultimately anti-inflammatory would improve both of these issues.  I like for him to start a Medrol Dosepak to decrease inflammation about the knee.  He can return in 1 month if no improvement.  We could consider further rheumatologic work-up at that time if necessary.    Hosea AWAD \"Chance\" Augustus YU, DO, " [facility]  01/08/20  1:17 PM    Disclaimer: Please note that areas of this note were completed with computer voice recognition software.  Quite often unanticipated grammatical, syntax, homophones, and other interpretive errors are inadvertently transcribed by the computer software. Please excuse any errors that have escaped final proofreading.

## 2020-02-19 RX ORDER — ATENOLOL 25 MG/1
TABLET ORAL
Qty: 90 TABLET | Refills: 3 | Status: SHIPPED | OUTPATIENT
Start: 2020-02-19 | End: 2021-04-06

## 2020-02-24 ENCOUNTER — OFFICE VISIT (OUTPATIENT)
Dept: FAMILY MEDICINE CLINIC | Facility: CLINIC | Age: 35
End: 2020-02-24

## 2020-02-24 VITALS
RESPIRATION RATE: 16 BRPM | DIASTOLIC BLOOD PRESSURE: 111 MMHG | HEART RATE: 129 BPM | WEIGHT: 234 LBS | HEIGHT: 72 IN | OXYGEN SATURATION: 94 % | TEMPERATURE: 98.9 F | BODY MASS INDEX: 31.69 KG/M2 | SYSTOLIC BLOOD PRESSURE: 157 MMHG

## 2020-02-24 DIAGNOSIS — J01.00 ACUTE NON-RECURRENT MAXILLARY SINUSITIS: Primary | ICD-10-CM

## 2020-02-24 PROCEDURE — 99213 OFFICE O/P EST LOW 20 MIN: CPT | Performed by: FAMILY MEDICINE

## 2020-02-24 RX ORDER — SULFAMETHOXAZOLE AND TRIMETHOPRIM 800; 160 MG/1; MG/1
1 TABLET ORAL 2 TIMES DAILY
Qty: 20 TABLET | Refills: 0 | Status: SHIPPED | OUTPATIENT
Start: 2020-02-24 | End: 2020-04-28

## 2020-02-24 RX ORDER — AZELASTINE 1 MG/ML
SPRAY, METERED NASAL
COMMUNITY
Start: 2020-01-14 | End: 2020-08-05

## 2020-02-24 NOTE — PATIENT INSTRUCTIONS
Sinusitis, Adult  Sinusitis is soreness and swelling (inflammation) of your sinuses. Sinuses are hollow spaces in the bones around your face. They are located:  · Around your eyes.  · In the middle of your forehead.  · Behind your nose.  · In your cheekbones.  Your sinuses and nasal passages are lined with a fluid called mucus. Mucus drains out of your sinuses. Swelling can trap mucus in your sinuses. This lets germs (bacteria, virus, or fungus) grow, which leads to infection. Most of the time, this condition is caused by a virus.  What are the causes?  This condition is caused by:  · Allergies.  · Asthma.  · Germs.  · Things that block your nose or sinuses.  · Growths in the nose (nasal polyps).  · Chemicals or irritants in the air.  · Fungus (rare).  What increases the risk?  You are more likely to develop this condition if:  · You have a weak body defense system (immune system).  · You do a lot of swimming or diving.  · You use nasal sprays too much.  · You smoke.  What are the signs or symptoms?  The main symptoms of this condition are pain and a feeling of pressure around the sinuses. Other symptoms include:  · Stuffy nose (congestion).  · Runny nose (drainage).  · Swelling and warmth in the sinuses.  · Headache.  · Toothache.  · A cough that may get worse at night.  · Mucus that collects in the throat or the back of the nose (postnasal drip).  · Being unable to smell and taste.  · Being very tired (fatigue).  · A fever.  · Sore throat.  · Bad breath.  How is this diagnosed?  This condition is diagnosed based on:  · Your symptoms.  · Your medical history.  · A physical exam.  · Tests to find out if your condition is short-term (acute) or long-term (chronic). Your doctor may:  ? Check your nose for growths (polyps).  ? Check your sinuses using a tool that has a light (endoscope).  ? Check for allergies or germs.  ? Do imaging tests, such as an MRI or CT scan.  How is this treated?  Treatment for this condition  depends on the cause and whether it is short-term or long-term.  · If caused by a virus, your symptoms should go away on their own within 10 days. You may be given medicines to relieve symptoms. They include:  ? Medicines that shrink swollen tissue in the nose.  ? Medicines that treat allergies (antihistamines).  ? A spray that treats swelling of the nostrils.   ? Rinses that help get rid of thick mucus in your nose (nasal saline washes).  · If caused by bacteria, your doctor may wait to see if you will get better without treatment. You may be given antibiotic medicine if you have:  ? A very bad infection.  ? A weak body defense system.  · If caused by growths in the nose, you may need to have surgery.  Follow these instructions at home:  Medicines  · Take, use, or apply over-the-counter and prescription medicines only as told by your doctor. These may include nasal sprays.  · If you were prescribed an antibiotic medicine, take it as told by your doctor. Do not stop taking the antibiotic even if you start to feel better.  Hydrate and humidify    · Drink enough water to keep your pee (urine) pale yellow.  · Use a cool mist humidifier to keep the humidity level in your home above 50%.  · Breathe in steam for 10-15 minutes, 3-4 times a day, or as told by your doctor. You can do this in the bathroom while a hot shower is running.  · Try not to spend time in cool or dry air.  Rest  · Rest as much as you can.  · Sleep with your head raised (elevated).  · Make sure you get enough sleep each night.  General instructions    · Put a warm, moist washcloth on your face 3-4 times a day, or as often as told by your doctor. This will help with discomfort.  · Wash your hands often with soap and water. If there is no soap and water, use hand .  · Do not smoke. Avoid being around people who are smoking (secondhand smoke).  · Keep all follow-up visits as told by your doctor. This is important.  Contact a doctor if:  · You  have a fever.  · Your symptoms get worse.  · Your symptoms do not get better within 10 days.  Get help right away if:  · You have a very bad headache.  · You cannot stop throwing up (vomiting).  · You have very bad pain or swelling around your face or eyes.  · You have trouble seeing.  · You feel confused.  · Your neck is stiff.  · You have trouble breathing.  Summary  · Sinusitis is swelling of your sinuses. Sinuses are hollow spaces in the bones around your face.  · This condition is caused by tissues in your nose that become inflamed or swollen. This traps germs. These can lead to infection.  · If you were prescribed an antibiotic medicine, take it as told by your doctor. Do not stop taking it even if you start to feel better.  · Keep all follow-up visits as told by your doctor. This is important.  This information is not intended to replace advice given to you by your health care provider. Make sure you discuss any questions you have with your health care provider.  Document Released: 06/05/2009 Document Revised: 05/20/2019 Document Reviewed: 05/20/2019  [employer] Interactive Patient Education © 2020 [employer]

## 2020-02-24 NOTE — PROGRESS NOTES
Subjective   Chief Complaint   Patient presents with   • Sinusitis     Davian Arevalo is a 34 y.o. male.     Sinusitis   This is a new problem. The current episode started in the past 7 days. The problem has been gradually worsening since onset. There has been no fever. Associated symptoms include chills, coughing, headaches, sinus pressure and a sore throat. Pertinent negatives include no ear pain, shortness of breath or swollen glands. Past treatments include nothing.      Past Medical History:   Diagnosis Date   • Abdominal pain, chronic, right lower quadrant 01/30/2015   • Anxiety    • Autism 08/13/2014    mild ( Abstracted from [facility]   • Carpal tunnel syndrome 02/22/2018   • Chest pain 02/23/2018   • Constipation 09/21/2018   • Corneal dystrophy    • Depression    • Dermatitis 01/07/2019   • Dyslexia 12/26/2017   • Dyslexia    • Fatigue 08/20/2015   • Hypertension    • IBS (irritable bowel syndrome) 09/21/2018   • IBS (irritable bowel syndrome)    • Joint pain 12/26/2017   • Knee pain, left 07/25/2017   • Lower back pain 01/30/2015   • Near syncope 02/19/2019   • Neck pain    • Neck pain on left side 11/13/2018   • Numbness and tingling of right arm 12/26/2017   • Pain in joint of right wrist 02/22/2018   • Rheumatoid factor positive 05/06/2019   • Right cervical radiculopathy 01/07/2019   • Shoulder pain, right 02/14/2017   • Tachycardia 02/19/2019   • Tinnitus    • Visit for preventive health examination 08/13/2014   • Vitamin D deficiency 12/26/2017     Past Surgical History:   Procedure Laterality Date   • CHOLECYSTECTOMY     • TONSILLECTOMY  1993    Tonsillectomy and Adenoids-   • TONSILLECTOMY AND ADENOIDECTOMY       Allergies   Allergen Reactions   • Amoxicillin Unknown (See Comments)     Abstracted from [facility].    • Ceclor [Cefaclor] Unknown (See Comments)     Abstracted from [facility].    • Cephalexin Unknown (See Comments)     Abstracted from [facility].    • Amoxicillin Hives   •  Cephalexin Hives   • Cloral Hives     Social History     Socioeconomic History   • Marital status: Single     Spouse name: Not on file   • Number of children: Not on file   • Years of education: Not on file   • Highest education level: Not on file   Tobacco Use   • Smoking status: Never Smoker   • Smokeless tobacco: Never Used   Substance and Sexual Activity   • Alcohol use: Never     Frequency: Never   • Drug use: No   Social History Narrative    ** Merged History Encounter **          Social History     Tobacco Use   Smoking Status Never Smoker   Smokeless Tobacco Never Used       family history includes Atrial fibrillation in his mother; Cancer in his father and mother; Diabetes in his maternal grandfather, maternal grandmother, and paternal uncle; Hyperlipidemia in his father; Hypertension in his father and mother; Stroke in his father, maternal grandfather, and another family member.  Current Outpatient Medications on File Prior to Visit   Medication Sig Dispense Refill   • acetaminophen (TYLENOL) 325 MG tablet Take 650 mg by mouth Every 6 (Six) Hours As Needed for Mild Pain .     • ALLERGY SERUM INJECTION Inject  under the skin into the appropriate area as directed 1 (One) Time.     • atenolol (TENORMIN) 25 MG tablet TAKE ONE TABLET BY MOUTH ONCE NIGHTLY 90 tablet 3   • azelastine (ASTELIN) 0.1 % nasal spray      • cholecalciferol (VITAMIN D3) 1000 units tablet Take 1,000 Units by mouth Daily.     • dicyclomine (BENTYL) 20 MG tablet Take 20 mg by mouth As Needed.     • dicyclomine (BENTYL) 20 MG tablet      • esomeprazole (nexIUM) 40 MG capsule      • esomeprazole (nexIUM) 40 MG capsule      • fluticasone (FLONASE) 50 MCG/ACT nasal spray 2 sprays into the nostril(s) as directed by provider Daily. 2 sprays per nostril. ( Abstracted from [facility]. )     • folic acid-vit B6-vit B12 (FOLBEE) 2.5-25-1 MG tablet tablet Take  by mouth Daily.     • lisinopril (PRINIVIL,ZESTRIL) 20 MG tablet TAKE ONE TABLET BY MOUTH  DAILY 90 tablet 2   • lisinopril (PRINIVIL,ZESTRIL) 20 MG tablet      • methylPREDNISolone (MEDROL, VIJAY,) 4 MG tablet Use as directed by package instructions 21 tablet 0   • naproxen (NAPROSYN) 500 MG tablet Take 1 tablet by mouth 2 (Two) Times a Day With Meals. 60 tablet 2   • nortriptyline (PAMELOR) 10 MG capsule Take 10 mg by mouth 2 (Two) Times a Day.     • nortriptyline (PAMELOR) 50 MG capsule Take 50 mg by mouth Every Night.     • vitamin B-12 (CYANOCOBALAMIN) 1000 MCG tablet Take 1,000 mcg by mouth Daily.     • vitamin D (ERGOCALCIFEROL) 1.25 MG (63741 UT) capsule capsule Take 50,000 Units by mouth 1 (One) Time Per Week.       No current facility-administered medications on file prior to visit.      Patient Active Problem List   Diagnosis   • Neck strain, initial encounter   • Abdominal pain, right lower quadrant   • Anxiety disorder   • Autistic disorder   • Carpal tunnel syndrome   • Constipation   • Depression   • Dyslexia   • Elevated rheumatoid factor   • Hypertension   • Near syncope   • IBS (irritable bowel syndrome)   • Low back pain   • Encounter for general adult medical examination without abnormal findings   • Vitamin D deficiency   • Tachycardia   • Vitamin B12 deficiency   • Fatigue   • Acute pain of left knee   • Acute non-recurrent maxillary sinusitis       The following portions of the patient's history were reviewed and updated as appropriate: allergies, current medications, past family history, past medical history, past social history, past surgical history and problem list.    Review of Systems   Constitutional: Positive for chills. Negative for fever.   HENT: Positive for sinus pressure, sore throat and tinnitus. Negative for ear pain and swollen glands.    Eyes: Negative for blurred vision.   Respiratory: Positive for cough. Negative for shortness of breath.    Cardiovascular: Negative for chest pain and palpitations.   Gastrointestinal: Negative for abdominal pain.   Endocrine:  "Negative for polyuria.   Skin: Negative for rash.   Neurological: Negative for dizziness and headache.   Hematological: Negative for adenopathy.   Psychiatric/Behavioral: Negative for depressed mood.       Objective   BP (!) 157/111 (BP Location: Right arm, Patient Position: Sitting, Cuff Size: Adult)   Pulse (!) 129   Temp 98.9 °F (37.2 °C) (Oral)   Resp 16   Ht 182.9 cm (72\")   Wt 106 kg (234 lb)   SpO2 94%   BMI 31.74 kg/m²   Physical Exam   Constitutional: He is oriented to person, place, and time. He appears well-developed. No distress.   HENT:   Head: Normocephalic.   Right Ear: Tympanic membrane is injected.   Left Ear: Tympanic membrane is injected.   Mouth/Throat: Posterior oropharyngeal erythema present.   Eyes: Conjunctivae and lids are normal.   Neck: Trachea normal. No thyroid mass and no thyromegaly present.   Cardiovascular: Normal rate, regular rhythm and normal heart sounds.   Pulmonary/Chest: Effort normal and breath sounds normal.   Lymphadenopathy:     He has no cervical adenopathy.   Neurological: He is alert and oriented to person, place, and time.   Skin: Skin is warm and dry.   Psychiatric: He has a normal mood and affect. His speech is normal and behavior is normal. He is attentive.       No visits with results within 1 Week(s) from this visit.   Latest known visit with results is:   Office Visit on 12/02/2019   Component Date Value Ref Range Status   • Glucose 12/02/2019 101* 65 - 99 mg/dL Final   • BUN 12/02/2019 16  6 - 20 mg/dL Final   • Creatinine 12/02/2019 1.09  0.76 - 1.27 mg/dL Final   • Sodium 12/02/2019 140  136 - 145 mmol/L Final   • Potassium 12/02/2019 4.5  3.5 - 5.2 mmol/L Final   • Chloride 12/02/2019 98  98 - 107 mmol/L Final   • CO2 12/02/2019 29.1* 22.0 - 29.0 mmol/L Final   • Calcium 12/02/2019 10.0  8.6 - 10.5 mg/dL Final   • Total Protein 12/02/2019 8.0  6.0 - 8.5 g/dL Final   • Albumin 12/02/2019 4.60  3.50 - 5.20 g/dL Final   • ALT (SGPT) 12/02/2019 41  1 - 41 " U/L Final   • AST (SGOT) 12/02/2019 22  1 - 40 U/L Final   • Alkaline Phosphatase 12/02/2019 86  39 - 117 U/L Final   • Total Bilirubin 12/02/2019 0.4  0.2 - 1.2 mg/dL Final   • eGFR Non African Amer 12/02/2019 77  >60 mL/min/1.73 Final   • Globulin 12/02/2019 3.4  gm/dL Final   • A/G Ratio 12/02/2019 1.4  g/dL Final   • BUN/Creatinine Ratio 12/02/2019 14.7  7.0 - 25.0 Final   • Anion Gap 12/02/2019 12.9  5.0 - 15.0 mmol/L Final   • TSH 12/02/2019 2.170  0.270 - 4.200 uIU/mL Final   • Vitamin B-12 12/02/2019 660  211 - 946 pg/mL Final   • 25 Hydroxy, Vitamin D 12/02/2019 42.1  30.0 - 100.0 ng/ml Final   • WBC 12/02/2019 8.97  3.40 - 10.80 10*3/mm3 Final   • RBC 12/02/2019 5.45  4.14 - 5.80 10*6/mm3 Final   • Hemoglobin 12/02/2019 15.9  13.0 - 17.7 g/dL Final   • Hematocrit 12/02/2019 48.1  37.5 - 51.0 % Final   • MCV 12/02/2019 88.3  79.0 - 97.0 fL Final   • MCH 12/02/2019 29.2  26.6 - 33.0 pg Final   • MCHC 12/02/2019 33.1  31.5 - 35.7 g/dL Final   • RDW 12/02/2019 12.7  12.3 - 15.4 % Final   • RDW-SD 12/02/2019 41.2  37.0 - 54.0 fl Final   • MPV 12/02/2019 9.3  6.0 - 12.0 fL Final   • Platelets 12/02/2019 394  140 - 450 10*3/mm3 Final   • Neutrophil % 12/02/2019 60.0  42.7 - 76.0 % Final   • Lymphocyte % 12/02/2019 24.0  19.6 - 45.3 % Final   • Monocyte % 12/02/2019 10.6  5.0 - 12.0 % Final   • Eosinophil % 12/02/2019 4.0  0.3 - 6.2 % Final   • Basophil % 12/02/2019 0.8  0.0 - 1.5 % Final   • Immature Grans % 12/02/2019 0.6* 0.0 - 0.5 % Final   • Neutrophils, Absolute 12/02/2019 5.39  1.70 - 7.00 10*3/mm3 Final   • Lymphocytes, Absolute 12/02/2019 2.15  0.70 - 3.10 10*3/mm3 Final   • Monocytes, Absolute 12/02/2019 0.95* 0.10 - 0.90 10*3/mm3 Final   • Eosinophils, Absolute 12/02/2019 0.36  0.00 - 0.40 10*3/mm3 Final   • Basophils, Absolute 12/02/2019 0.07  0.00 - 0.20 10*3/mm3 Final   • Immature Grans, Absolute 12/02/2019 0.05  0.00 - 0.05 10*3/mm3 Final   • nRBC 12/02/2019 0.0  0.0 - 0.2 /100 WBC Final            Assessment/Plan   Problems Addressed this Visit        Respiratory    Acute non-recurrent maxillary sinusitis - Primary

## 2020-04-28 ENCOUNTER — OFFICE VISIT (OUTPATIENT)
Dept: FAMILY MEDICINE CLINIC | Facility: CLINIC | Age: 35
End: 2020-04-28

## 2020-04-28 DIAGNOSIS — L03.116 CELLULITIS OF LEFT THIGH: Primary | ICD-10-CM
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
## 2020-04-28 PROCEDURE — 99213 OFFICE O/P EST LOW 20 MIN: CPT | Performed by: FAMILY MEDICINE

## 2020-04-28 RX ORDER — DOXYCYCLINE 100 MG/1
100 CAPSULE ORAL 2 TIMES DAILY
Qty: 20 CAPSULE | Refills: 0 | Status: SHIPPED | OUTPATIENT
Start: 2020-04-28 | End: 2020-08-05

## 2020-04-28 NOTE — PROGRESS NOTES
Subjective   No chief complaint on file.    Davian Arevalo is a 34 y.o. male.   You have chosen to receive care through a telephone visit. Do you consent to use a telephone visit for your medical care today? Yes    He reports that he was bit by a tick about 4 days ago on his left thigh.  He removed the tick.  Since then he has noted increased swelling and redness.  The area is oval in shape and about the size of a chicken nugget.  He also has some rash on his abdomen and wrist but does not know if they are related.     Rash   This is a new problem. The current episode started in the past 7 days. The problem has been gradually worsening since onset. The affected locations include the left upper leg. The rash is characterized by redness and swelling. He was exposed to an insect bite/sting. Pertinent negatives include no congestion, cough, diarrhea, fever, rhinorrhea, shortness of breath or sore throat. Past treatments include nothing.      Past Medical History:   Diagnosis Date   • Abdominal pain, chronic, right lower quadrant 01/30/2015   • Anxiety    • Autism 08/13/2014    mild ( [facility]   • Carpal tunnel syndrome 02/22/2018   • Chest pain 02/23/2018   • Constipation 09/21/2018   • Corneal dystrophy    • Depression    • Dermatitis 01/07/2019   • Dyslexia 12/26/2017   • Dyslexia    • Fatigue 08/20/2015   • Hypertension    • IBS (irritable bowel syndrome) 09/21/2018   • IBS (irritable bowel syndrome)    • Joint pain 12/26/2017   • Knee pain, left 07/25/2017   • Lower back pain 01/30/2015   • Near syncope 02/19/2019   • Neck pain    • Neck pain on left side 11/13/2018   • Numbness and tingling of right arm 12/26/2017   • Pain in joint of right wrist 02/22/2018   • Rheumatoid factor positive 05/06/2019   • Right cervical radiculopathy 01/07/2019   • Shoulder pain, right 02/14/2017   • Tachycardia 02/19/2019   • Tinnitus    • Visit for preventive health examination 08/13/2014   • Vitamin D  deficiency 12/26/2017     Past Surgical History:   Procedure Laterality Date   • CHOLECYSTECTOMY     • TONSILLECTOMY  1993    Tonsillectomy and Adenoids-   • TONSILLECTOMY AND ADENOIDECTOMY       Allergies   Allergen Reactions   • Amoxicillin Unknown (See Comments)     Abstracted from [facility].    • Ceclor [Cefaclor] Unknown (See Comments)     Abstracted from [facility].    • Cephalexin Unknown (See Comments)     Abstracted from [facility].    • Amoxicillin Hives   • Cephalexin Hives   • Cloral Hives     Social History     Socioeconomic History   • Marital status: Single     Spouse name: Not on file   • Number of children: Not on file   • Years of education: Not on file   • Highest education level: Not on file   Tobacco Use   • Smoking status: Never Smoker   • Smokeless tobacco: Never Used   Substance and Sexual Activity   • Alcohol use: Never     Frequency: Never   • Drug use: No   Social History Narrative    ** Merged History Encounter **          Social History     Tobacco Use   Smoking Status Never Smoker   Smokeless Tobacco Never Used       family history includes Atrial fibrillation in his mother; Cancer in his father and mother; Diabetes in his maternal grandfather, maternal grandmother, and paternal uncle; Hyperlipidemia in his father; Hypertension in his father and mother; Stroke in his father, maternal grandfather, and another family member.  Current Outpatient Medications on File Prior to Visit   Medication Sig Dispense Refill   • acetaminophen (TYLENOL) 325 MG tablet Take 650 mg by mouth Every 6 (Six) Hours As Needed for Mild Pain .     • ALLERGY SERUM INJECTION Inject  under the skin into the appropriate area as directed 1 (One) Time.     • atenolol (TENORMIN) 25 MG tablet TAKE ONE TABLET BY MOUTH ONCE NIGHTLY 90 tablet 3   • azelastine (ASTELIN) 0.1 % nasal spray      • cholecalciferol (VITAMIN D3) 1000 units tablet Take 1,000 Units by mouth Daily.     • dicyclomine (BENTYL) 20 MG tablet Take 20 mg  by mouth As Needed.     • dicyclomine (BENTYL) 20 MG tablet      • esomeprazole (nexIUM) 40 MG capsule      • esomeprazole (nexIUM) 40 MG capsule      • fluticasone (FLONASE) 50 MCG/ACT nasal spray 2 sprays into the nostril(s) as directed by provider Daily. 2 sprays per nostril. ( Abstracted from [facility]. )     • folic acid-vit B6-vit B12 (FOLBEE) 2.5-25-1 MG tablet tablet Take  by mouth Daily.     • lisinopril (PRINIVIL,ZESTRIL) 20 MG tablet TAKE ONE TABLET BY MOUTH DAILY 90 tablet 2   • lisinopril (PRINIVIL,ZESTRIL) 20 MG tablet      • methylPREDNISolone (MEDROL, VIJAY,) 4 MG tablet Use as directed by package instructions 21 tablet 0   • naproxen (NAPROSYN) 500 MG tablet Take 1 tablet by mouth 2 (Two) Times a Day With Meals. 60 tablet 2   • nortriptyline (PAMELOR) 10 MG capsule Take 10 mg by mouth 2 (Two) Times a Day.     • nortriptyline (PAMELOR) 50 MG capsule Take 50 mg by mouth Every Night.     • vitamin B-12 (CYANOCOBALAMIN) 1000 MCG tablet Take 1,000 mcg by mouth Daily.     • vitamin D (ERGOCALCIFEROL) 1.25 MG (24366 UT) capsule capsule Take 50,000 Units by mouth 1 (One) Time Per Week.     • [DISCONTINUED] sulfamethoxazole-trimethoprim (BACTRIM DS) 800-160 MG per tablet Take 1 tablet by mouth 2 (Two) Times a Day. 20 tablet 0     No current facility-administered medications on file prior to visit.      Patient Active Problem List   Diagnosis   • Neck strain, initial encounter   • Abdominal pain, right lower quadrant   • Anxiety disorder   • Autistic disorder   • Carpal tunnel syndrome   • Constipation   • Depression   • Dyslexia   • Elevated rheumatoid factor   • Hypertension   • Near syncope   • IBS (irritable bowel syndrome)   • Low back pain   • Encounter for general adult medical examination without abnormal findings   • Vitamin D deficiency   • Tachycardia   • Vitamin B12 deficiency   • Fatigue   • Acute pain of left knee   • Acute non-recurrent maxillary sinusitis   • Cellulitis of left thigh   • Tick  bite       The following portions of the patient's history were reviewed and updated as appropriate: allergies, current medications, past family history, past medical history, past social history, past surgical history and problem list.    Review of Systems   Constitutional: Negative for chills and fever.   HENT: Negative for congestion, rhinorrhea, sinus pressure and sore throat.    Eyes: Negative for blurred vision.   Respiratory: Negative for cough and shortness of breath.    Cardiovascular: Negative for chest pain and palpitations.   Gastrointestinal: Negative for abdominal pain and diarrhea.   Endocrine: Negative for polyuria.   Skin: Positive for rash.   Neurological: Negative for dizziness and headache.   Hematological: Negative for adenopathy.   Psychiatric/Behavioral: Negative for depressed mood.       Objective   There were no vitals taken for this visit.  Physical Exam    No visits with results within 1 Week(s) from this visit.   Latest known visit with results is:   Office Visit on 12/02/2019   Component Date Value Ref Range Status   • Glucose 12/02/2019 101* 65 - 99 mg/dL Final   • BUN 12/02/2019 16  6 - 20 mg/dL Final   • Creatinine 12/02/2019 1.09  0.76 - 1.27 mg/dL Final   • Sodium 12/02/2019 140  136 - 145 mmol/L Final   • Potassium 12/02/2019 4.5  3.5 - 5.2 mmol/L Final   • Chloride 12/02/2019 98  98 - 107 mmol/L Final   • CO2 12/02/2019 29.1* 22.0 - 29.0 mmol/L Final   • Calcium 12/02/2019 10.0  8.6 - 10.5 mg/dL Final   • Total Protein 12/02/2019 8.0  6.0 - 8.5 g/dL Final   • Albumin 12/02/2019 4.60  3.50 - 5.20 g/dL Final   • ALT (SGPT) 12/02/2019 41  1 - 41 U/L Final   • AST (SGOT) 12/02/2019 22  1 - 40 U/L Final   • Alkaline Phosphatase 12/02/2019 86  39 - 117 U/L Final   • Total Bilirubin 12/02/2019 0.4  0.2 - 1.2 mg/dL Final   • eGFR Non African Amer 12/02/2019 77  >60 mL/min/1.73 Final   • Globulin 12/02/2019 3.4  gm/dL Final   • A/G Ratio 12/02/2019 1.4  g/dL Final   • BUN/Creatinine Ratio  12/02/2019 14.7  7.0 - 25.0 Final   • Anion Gap 12/02/2019 12.9  5.0 - 15.0 mmol/L Final   • TSH 12/02/2019 2.170  0.270 - 4.200 uIU/mL Final   • Vitamin B-12 12/02/2019 660  211 - 946 pg/mL Final   • 25 Hydroxy, Vitamin D 12/02/2019 42.1  30.0 - 100.0 ng/ml Final   • WBC 12/02/2019 8.97  3.40 - 10.80 10*3/mm3 Final   • RBC 12/02/2019 5.45  4.14 - 5.80 10*6/mm3 Final   • Hemoglobin 12/02/2019 15.9  13.0 - 17.7 g/dL Final   • Hematocrit 12/02/2019 48.1  37.5 - 51.0 % Final   • MCV 12/02/2019 88.3  79.0 - 97.0 fL Final   • MCH 12/02/2019 29.2  26.6 - 33.0 pg Final   • MCHC 12/02/2019 33.1  31.5 - 35.7 g/dL Final   • RDW 12/02/2019 12.7  12.3 - 15.4 % Final   • RDW-SD 12/02/2019 41.2  37.0 - 54.0 fl Final   • MPV 12/02/2019 9.3  6.0 - 12.0 fL Final   • Platelets 12/02/2019 394  140 - 450 10*3/mm3 Final   • Neutrophil % 12/02/2019 60.0  42.7 - 76.0 % Final   • Lymphocyte % 12/02/2019 24.0  19.6 - 45.3 % Final   • Monocyte % 12/02/2019 10.6  5.0 - 12.0 % Final   • Eosinophil % 12/02/2019 4.0  0.3 - 6.2 % Final   • Basophil % 12/02/2019 0.8  0.0 - 1.5 % Final   • Immature Grans % 12/02/2019 0.6* 0.0 - 0.5 % Final   • Neutrophils, Absolute 12/02/2019 5.39  1.70 - 7.00 10*3/mm3 Final   • Lymphocytes, Absolute 12/02/2019 2.15  0.70 - 3.10 10*3/mm3 Final   • Monocytes, Absolute 12/02/2019 0.95* 0.10 - 0.90 10*3/mm3 Final   • Eosinophils, Absolute 12/02/2019 0.36  0.00 - 0.40 10*3/mm3 Final   • Basophils, Absolute 12/02/2019 0.07  0.00 - 0.20 10*3/mm3 Final   • Immature Grans, Absolute 12/02/2019 0.05  0.00 - 0.05 10*3/mm3 Final   • nRBC 12/02/2019 0.0  0.0 - 0.2 /100 WBC Final           Assessment/Plan   Problems Addressed this Visit        Musculoskeletal and Integument    Tick bite    Relevant Medications    doxycycline (MONODOX) 100 MG capsule       Other    Cellulitis of left thigh - Primary    Relevant Medications    doxycycline (MONODOX) 100 MG capsule        Total time spent on patient evaluation:  15 min

## 2020-04-28 NOTE — PATIENT INSTRUCTIONS
Cellulitis, Adult    Cellulitis is a skin infection. The infected area is often warm, red, swollen, and sore. It occurs most often in the arms and lower legs. It is very important to get treated for this condition.  What are the causes?  This condition is caused by bacteria. The bacteria enter through a break in the skin, such as a cut, burn, insect bite, open sore, or crack.  What increases the risk?  This condition is more likely to occur in people who:  · Have a weak body defense system (immune system).  · Have open cuts, burns, bites, or scrapes on the skin.  · Are older than 60 years of age.  · Have a blood sugar problem (diabetes).  · Have a long-lasting (chronic) liver disease (cirrhosis) or kidney disease.  · Are very overweight (obese).  · Have a skin problem, such as:  ? Itchy rash (eczema).  ? Slow movement of blood in the veins (venous stasis).  ? Fluid buildup below the skin (edema).  · Have been treated with high-energy rays (radiation).  · Use IV drugs.  What are the signs or symptoms?  Symptoms of this condition include:  · Skin that is:  ? Red.  ? Streaking.  ? Spotting.  ? Swollen.  ? Sore or painful when you touch it.  ? Warm.  · A fever.  · Chills.  · Blisters.  How is this diagnosed?  This condition is diagnosed based on:  · Medical history.  · Physical exam.  · Blood tests.  · Imaging tests.  How is this treated?  Treatment for this condition may include:  · Medicines to treat infections or allergies.  · Home care, such as:  ? Rest.  ? Placing cold or warm cloths (compresses) on the skin.  · Hospital care, if the condition is very bad.  Follow these instructions at home:  Medicines  · Take over-the-counter and prescription medicines only as told by your doctor.  · If you were prescribed an antibiotic medicine, take it as told by your doctor. Do not stop taking it even if you start to feel better.  General instructions    · Drink enough fluid to keep your pee (urine) pale yellow.  · Do not touch  or rub the infected area.  · Raise (elevate) the infected area above the level of your heart while you are sitting or lying down.  · Place cold or warm cloths on the area as told by your doctor.  · Keep all follow-up visits as told by your doctor. This is important.  Contact a doctor if:  · You have a fever.  · You do not start to get better after 1-2 days of treatment.  · Your bone or joint under the infected area starts to hurt after the skin has healed.  · Your infection comes back. This can happen in the same area or another area.  · You have a swollen bump in the area.  · You have new symptoms.  · You feel ill and have muscle aches and pains.  Get help right away if:  · Your symptoms get worse.  · You feel very sleepy.  · You throw up (vomit) or have watery poop (diarrhea) for a long time.  · You see red streaks coming from the area.  · Your red area gets larger.  · Your red area turns dark in color.  These symptoms may represent a serious problem that is an emergency. Do not wait to see if the symptoms will go away. Get medical help right away. Call your local emergency services (911 in the [place] Do not drive yourself to the hospital.  Summary  · Cellulitis is a skin infection. The area is often warm, red, swollen, and sore.  · This condition is treated with medicines, rest, and cold and warm cloths.  · Take all medicines only as told by your doctor.  · Tell your doctor if symptoms do not start to get better after 1-2 days of treatment.  This information is not intended to replace advice given to you by your health care provider. Make sure you discuss any questions you have with your health care provider.  Document Released: 06/05/2009 Document Revised: 05/09/2019 Document Reviewed: 05/09/2019  [employer] Interactive Patient Education © 2020 [employer]    Tick Bite Information, Adult    Ticks are insects that can bite. Most ticks live in shrubs and grassy areas. They climb onto people and animals that go by.  Then they bite. Some ticks carry germs that can make you sick.  How can I prevent tick bites?  · Use an insect repellent that has 20% or higher of the ingredients DEET, picaridin, or VL4805. Put this insect repellent on:  ? Bare skin.  ? The tops of your boots.  ? Your pant legs.  ? The ends of your sleeves.  · If you use an insect repellent that has the ingredient permethrin, make sure to follow the instructions on the bottle. Treat the following:  ? Clothing.  ? Supplies.  ? Boots.  ? Tents.  · Wear long sleeves, long pants, and light colors.  · Tuck your pant legs into your socks.  · Stay in the middle of the trail.  · Try not to walk through long grass.  · Before going inside your house, check your clothes, hair, and skin for ticks. Make sure to check your head, neck, armpits, waist, groin, and joint areas.  · Check for ticks every day.  · When you come indoors:  ? Wash your clothes right away.  ? Shower right away.  ? Dry your clothes in a dryer on high heat for 60 minutes or more.  What is the right way to remove a tick?  Remove a tick from your skin as soon as possible.  · To remove a tick that is crawling on your skin:  ? Go outdoors and brush the tick off.  ? Use tape or a lint roller.  · To remove a tick that is biting:  ? Wash your hands.  ? If you have latex gloves, put them on.  ? Use tweezers, curved forceps, or a tick-removal tool to grasp the tick. Grasp the tick as close to your skin and as close to the tick's head as possible.  ? Gently pull up until the tick lets go.  § Try to keep the tick's head attached to its body.  § Do not twist or jerk the tick.  § Do not squeeze or crush the tick.  Do not try to remove a tick with heat, alcohol, petroleum jelly, or fingernail polish.  How should I get rid of a tick?  Here are some ways to get rid of a tick that is alive:  · Place the tick in rubbing alcohol.  · Place the tick in a bag or container you can close tightly.  · Wrap the tick tightly in  tape.  · Flush the tick down the toilet.  Contact a doctor if:  · You have symptoms of a disease, such as:  ? Pain in a muscle, joint, or bone.  ? Trouble walking or moving your legs.  ? Numbness in your legs.  ? Inability to move (paralysis).  ? A red rash that makes a [place] (bull's-eye rash).  ? Redness and swelling where the tick bit you.  ? A fever.  ? Throwing up (vomiting) over and over.  ? Diarrhea.  ? Weight loss.  ? Tender and swollen lymph glands.  ? Shortness of breath.  ? Cough.  ? Belly pain (abdominal pain).  ? Headache.  ? Being more tired than normal.  ? A change in how alert (conscious) you are.  ? Confusion.  Get help right away if:  · You cannot remove a tick.  · A part of a tick breaks off and gets stuck in your skin.  · You are feeling worse.  Summary  · Ticks may carry germs that can make you sick.  · To prevent tick bites, wear long sleeves, long pants, and light colors. Use insect repellent. Follow the instructions on the bottle.  · If the tick is biting, do not try to remove it with heat, alcohol, petroleum jelly, or fingernail polish.  · Use tweezers, curved forceps, or a tick-removal tool to grasp the tick. Gently pull up until the tick lets go. Do not twist or jerk the tick. Do not squeeze or crush the tick.  · If you have symptoms, contact a doctor.  This information is not intended to replace advice given to you by your health care provider. Make sure you discuss any questions you have with your health care provider.  Document Released: 03/14/2011 Document Revised: 12/03/2019 Document Reviewed: 03/30/2018  [employer] Interactive Patient Education © 2020 [employer]

## 2020-05-06 ENCOUNTER — APPOINTMENT (OUTPATIENT)
Dept: ULTRASOUND IMAGING | Facility: HOSPITAL | Age: 35
End: 2020-05-06

## 2020-05-06 ENCOUNTER — HOSPITAL ENCOUNTER (EMERGENCY)
Facility: HOSPITAL | Age: 35
Discharge: HOME | End: 2020-05-06
Attending: EMERGENCY MEDICINE | Admitting: EMERGENCY MEDICINE

## 2020-05-06 VITALS
OXYGEN SATURATION: 98 % | RESPIRATION RATE: 15 BRPM | WEIGHT: 227.8 LBS | HEART RATE: 81 BPM | BODY MASS INDEX: 30.85 KG/M2 | DIASTOLIC BLOOD PRESSURE: 73 MMHG | TEMPERATURE: 98.2 F | HEIGHT: 72 IN | SYSTOLIC BLOOD PRESSURE: 121 MMHG

## 2020-05-06 DIAGNOSIS — N50.82 SCROTAL PAIN: ICD-10-CM

## 2020-05-06 DIAGNOSIS — N43.3 HYDROCELE IN ADULT: Primary | ICD-10-CM

## 2020-05-06 DIAGNOSIS — N34.1 URETHRITIS, NONSPECIFIC: ICD-10-CM

## 2020-05-06 LAB
BILIRUB UR QL STRIP: NEGATIVE
C TRACH RRNA CVX QL NAA+PROBE: NOT DETECTED
CLARITY UR: CLEAR
COLOR UR: ABNORMAL
GLUCOSE UR STRIP-MCNC: NEGATIVE MG/DL
HGB UR QL STRIP.AUTO: NEGATIVE
KETONES UR QL STRIP: ABNORMAL
LEUKOCYTE ESTERASE UR QL STRIP.AUTO: NEGATIVE
N GONORRHOEA RRNA SPEC QL NAA+PROBE: NOT DETECTED
NITRITE UR QL STRIP: NEGATIVE
PH UR STRIP.AUTO: 5.5 [PH] (ref 5–8)
PROT UR QL STRIP: NEGATIVE
SP GR UR STRIP: 1.03 (ref 1–1.03)
UROBILINOGEN UR QL STRIP: ABNORMAL

## 2020-05-06 PROCEDURE — 87591 N.GONORRHOEAE DNA AMP PROB: CPT | Performed by: EMERGENCY MEDICINE

## 2020-05-06 PROCEDURE — 81003 URINALYSIS AUTO W/O SCOPE: CPT | Performed by: EMERGENCY MEDICINE

## 2020-05-06 PROCEDURE — 76870 US EXAM SCROTUM: CPT

## 2020-05-06 PROCEDURE — 99284 EMERGENCY DEPT VISIT MOD MDM: CPT

## 2020-05-06 PROCEDURE — 87491 CHLMYD TRACH DNA AMP PROBE: CPT | Performed by: EMERGENCY MEDICINE

## 2020-05-06 RX ORDER — HYDROCODONE BITARTRATE AND ACETAMINOPHEN 5; 325 MG/1; MG/1
1 TABLET ORAL EVERY 6 HOURS PRN
Qty: 12 TABLET | Refills: 0 | Status: SHIPPED | OUTPATIENT
Start: 2020-05-06 | End: 2020-08-05

## 2020-05-06 RX ORDER — HYDROCODONE BITARTRATE AND ACETAMINOPHEN 5; 325 MG/1; MG/1
1 TABLET ORAL ONCE AS NEEDED
Status: DISCONTINUED | OUTPATIENT
Start: 2020-05-06 | End: 2020-05-06 | Stop reason: HOSPADM

## 2020-05-06 RX ORDER — DOXYCYCLINE 100 MG/1
100 CAPSULE ORAL 2 TIMES DAILY
Qty: 14 CAPSULE | Refills: 0 | Status: SHIPPED | OUTPATIENT
Start: 2020-05-06 | End: 2020-08-05

## 2020-05-06 RX ORDER — LEVOFLOXACIN 750 MG/1
750 TABLET ORAL ONCE
Status: COMPLETED | OUTPATIENT
Start: 2020-05-06 | End: 2020-05-06

## 2020-05-06 RX ADMIN — LEVOFLOXACIN 750 MG: 750 TABLET, FILM COATED ORAL at 19:33

## 2020-05-06 NOTE — DISCHARGE INSTRUCTIONS
Rest next 24 hours  Medication as directed  Use scrotal support or jockstrap next 5 days  Follow-up with urologist

## 2020-05-06 NOTE — ED NOTES
Pain in left testicle, started yesterday at 0100.  Patient feels like he still needs to urinate even after he relieves himself.  Patient has some drainage from his penis that is yellow in color.  Patient is still able to urinate.       Adenike Khanna, LPN  05/06/20 [id number]

## 2020-05-06 NOTE — ED PROVIDER NOTES
Subjective   34-year-old male complaining of testicular discomfort.  He states is worse on the left than the right.  He reports no decrease in sensation or circulation.  He reports he has had some drainage from his meatus that is been mostly clear.  Reports no fever chills.  He denies rashes.  Reports no joint pain or swelling          Review of Systems   Gastrointestinal: Negative for abdominal pain and nausea.   Genitourinary: Positive for discharge and testicular pain. Negative for decreased urine volume, flank pain, genital sores and urgency.       Past Medical History:   Diagnosis Date   • Abdominal pain, chronic, right lower quadrant 01/30/2015   • Anxiety    • Autism 08/13/2014    mild ( [facility]   • Carpal tunnel syndrome 02/22/2018   • Chest pain 02/23/2018   • Constipation 09/21/2018   • Corneal dystrophy    • Depression    • Dermatitis 01/07/2019   • Dyslexia 12/26/2017   • Dyslexia    • Fatigue 08/20/2015   • Hypertension    • IBS (irritable bowel syndrome) 09/21/2018   • IBS (irritable bowel syndrome)    • Joint pain 12/26/2017   • Knee pain, left 07/25/2017   • Lower back pain 01/30/2015   • Near syncope 02/19/2019   • Neck pain    • Neck pain on left side 11/13/2018   • Numbness and tingling of right arm 12/26/2017   • Pain in joint of right wrist 02/22/2018   • Rheumatoid factor positive 05/06/2019   • Right cervical radiculopathy 01/07/2019   • Shoulder pain, right 02/14/2017   • Tachycardia 02/19/2019   • Tinnitus    • Visit for preventive health examination 08/13/2014   • Vitamin D deficiency 12/26/2017       Allergies   Allergen Reactions   • Ceclor [Cefaclor] Hives   • Amoxicillin Hives   • Cephalexin Hives   • Cloral Hives       Past Surgical History:   Procedure Laterality Date   • CHOLECYSTECTOMY     • TONSILLECTOMY  1993    Tonsillectomy and Adenoids-   • TONSILLECTOMY AND ADENOIDECTOMY         Family History   Problem Relation Age of Onset   • Cancer Mother    • Cancer  Father    • Hypertension Father    • Stroke Father    • Hyperlipidemia Father    • Hypertension Mother    • Atrial fibrillation Mother    • Diabetes Paternal Uncle    • Diabetes Maternal Grandmother    • Stroke Maternal Grandfather    • Diabetes Maternal Grandfather    • Stroke Other        Social History     Socioeconomic History   • Marital status: Single     Spouse name: Not on file   • Number of children: Not on file   • Years of education: Not on file   • Highest education level: Not on file   Tobacco Use   • Smoking status: Never Smoker   • Smokeless tobacco: Never Used   Substance and Sexual Activity   • Alcohol use: Never     Frequency: Never   • Drug use: No   Social History Narrative    ** Merged History Encounter **                Objective   Physical Exam  Alert nontoxic [place] Coma Scale 15  Abdomen positive bowel sounds nontender nondistended no rebound or peritoneal signs.  No CVA tenderness  : Circumcised male bilaterally descended testicles there is a fullness noted superior portion of the left testicle with some tenderness noted bilaterally.  There is some improvement of pain with testicular elevation.  There is meatal erythema  Procedures           ED Course            Labs Reviewed   URINALYSIS W/ CULTURE IF INDICATED - Abnormal; Notable for the following components:       Result Value    Color, UA Dark Yellow (*)     Ketones, UA Trace (*)     All other components within normal limits    Narrative:     Urine microscopic not indicated.   CHLAMYDIA TRACHOMATIS, NEISSERIA GONORRHOEAE, PCR - Normal     Medications   HYDROcodone-acetaminophen (NORCO) 5-325 MG per tablet 1 tablet (1 tablet Oral Not Given 5/6/20 1700)   levoFLOXacin (LEVAQUIN) tablet 750 mg (750 mg Oral Given 5/6/20 1933)     Us Scrotum & Testicles    Result Date: 5/6/2020   1.  Small right hydrocele. 2.  5 mm cyst or hydrocele the right epididymis. 3.  No evidence of intratesticular mass lesion or testicular torsion.  Electronically  Signed By-Don Burt On:5/6/2020 6:48 PM This report was finalized on [id number] by  Don Burt, .                                    MDM  Number of Diagnoses or Management Options     Amount and/or Complexity of Data Reviewed  Clinical lab tests: reviewed  Tests in the radiology section of CPT®: reviewed    Risk of Complications, Morbidity, and/or Mortality  Presenting problems: high  Diagnostic procedures: high  Management options: high  General comments: Patient was injected with ceftriaxone.  GC and Chlamydia were pending.  The patient was given a prescription for doxycycline and hydrocodone he was encouraged to follow-up with urology.  The patient was stable at discharge and vocalized understanding of discharge instructions and warnings        Final diagnoses:   Hydrocele in adult   Urethritis, nonspecific   Scrotal pain            Les Miramontes MD  05/06/20 1938

## 2020-05-07 ENCOUNTER — TELEPHONE (OUTPATIENT)
Dept: FAMILY MEDICINE CLINIC | Facility: CLINIC | Age: 35
End: 2020-05-07

## 2020-05-07 NOTE — TELEPHONE ENCOUNTER
Pt called stating that he has testicular pain. Dr. Zamorano advised that he go to the ER. Pt notified. 5/6/20 @ 3:55 pm.

## 2020-08-05 ENCOUNTER — OFFICE VISIT (OUTPATIENT)
Dept: FAMILY MEDICINE CLINIC | Facility: CLINIC | Age: 35
End: 2020-08-05

## 2020-08-05 VITALS
DIASTOLIC BLOOD PRESSURE: 75 MMHG | HEART RATE: 81 BPM | WEIGHT: 217 LBS | BODY MASS INDEX: 29.43 KG/M2 | OXYGEN SATURATION: 96 % | TEMPERATURE: 99.5 F | SYSTOLIC BLOOD PRESSURE: 115 MMHG

## 2020-08-05 DIAGNOSIS — J02.9 PHARYNGITIS, UNSPECIFIED ETIOLOGY: Primary | ICD-10-CM

## 2020-08-05 PROCEDURE — 99213 OFFICE O/P EST LOW 20 MIN: CPT | Performed by: FAMILY MEDICINE

## 2020-08-05 RX ORDER — AZITHROMYCIN 250 MG/1
TABLET, FILM COATED ORAL
Qty: 6 TABLET | Refills: 0 | Status: SHIPPED | OUTPATIENT
Start: 2020-08-05 | End: 2020-12-11

## 2020-08-05 NOTE — PROGRESS NOTES
Subjective   Chief Complaint   Patient presents with   • Jaw Pain     rt side, feels like lyphmnode is swollen, tired, upset stomach, ST 1 wk ago     Davian Arevalo is a 34 y.o. male.     Jaw Pain   This is a new problem. The current episode started in the past 7 days. The problem occurs constantly. Associated symptoms include nausea, a sore throat and vomiting. Pertinent negatives include no abdominal pain, chest pain, chills, congestion, coughing, fatigue, fever, headaches, neck pain or rash. The symptoms are aggravated by eating. He has tried nothing for the symptoms.      Past Medical History:   Diagnosis Date   • Abdominal pain, chronic, right lower quadrant 01/30/2015   • Anxiety    • Autism 08/13/2014    mild ( [facility]   • Carpal tunnel syndrome 02/22/2018   • Chest pain 02/23/2018   • Constipation 09/21/2018   • Corneal dystrophy    • Depression    • Dermatitis 01/07/2019   • Dyslexia 12/26/2017   • Dyslexia    • Fatigue 08/20/2015   • Hypertension    • IBS (irritable bowel syndrome) 09/21/2018   • IBS (irritable bowel syndrome)    • Joint pain 12/26/2017   • Knee pain, left 07/25/2017   • Lower back pain 01/30/2015   • Near syncope 02/19/2019   • Neck pain    • Neck pain on left side 11/13/2018   • Numbness and tingling of right arm 12/26/2017   • Pain in joint of right wrist 02/22/2018   • Rheumatoid factor positive 05/06/2019   • Right cervical radiculopathy 01/07/2019   • Shoulder pain, right 02/14/2017   • Tachycardia 02/19/2019   • Tinnitus    • Visit for preventive health examination 08/13/2014   • Vitamin D deficiency 12/26/2017     Past Surgical History:   Procedure Laterality Date   • CHOLECYSTECTOMY     • TONSILLECTOMY  1993    Tonsillectomy and Adenoids-   • TONSILLECTOMY AND ADENOIDECTOMY       Allergies   Allergen Reactions   • Ceclor [Cefaclor] Hives   • Amoxicillin Hives   • Cephalexin Hives   • Cloral Hives     Social History     Socioeconomic History   • Marital  status: Single     Spouse name: Not on file   • Number of children: Not on file   • Years of education: Not on file   • Highest education level: Not on file   Tobacco Use   • Smoking status: Never Smoker   • Smokeless tobacco: Never Used   Substance and Sexual Activity   • Alcohol use: Never     Frequency: Never   • Drug use: No   Social History Narrative    ** Merged History Encounter **          Social History     Tobacco Use   Smoking Status Never Smoker   Smokeless Tobacco Never Used       family history includes Atrial fibrillation in his mother; Cancer in his father and mother; Diabetes in his maternal grandfather, maternal grandmother, and paternal uncle; Hyperlipidemia in his father; Hypertension in his father and mother; Stroke in his father, maternal grandfather, and another family member.  Current Outpatient Medications on File Prior to Visit   Medication Sig Dispense Refill   • acetaminophen (TYLENOL) 325 MG tablet Take 650 mg by mouth Every 6 (Six) Hours As Needed for Mild Pain .     • atenolol (TENORMIN) 25 MG tablet TAKE ONE TABLET BY MOUTH ONCE NIGHTLY 90 tablet 3   • cholecalciferol (VITAMIN D3) 1000 units tablet Take 1,000 Units by mouth Daily.     • dicyclomine (BENTYL) 20 MG tablet Take 20 mg by mouth As Needed.     • folic acid-vit B6-vit B12 (FOLBEE) 2.5-25-1 MG tablet tablet Take  by mouth Daily.     • lisinopril (PRINIVIL,ZESTRIL) 20 MG tablet TAKE ONE TABLET BY MOUTH DAILY 90 tablet 2   • naproxen (NAPROSYN) 500 MG tablet Take 1 tablet by mouth 2 (Two) Times a Day With Meals. 60 tablet 2   • nortriptyline (PAMELOR) 10 MG capsule Take 10 mg by mouth 2 (Two) Times a Day.     • nortriptyline (PAMELOR) 50 MG capsule Take 50 mg by mouth Every Night.     • vitamin B-12 (CYANOCOBALAMIN) 1000 MCG tablet Take 1,000 mcg by mouth Daily.     • [DISCONTINUED] ALLERGY SERUM INJECTION Inject  under the skin into the appropriate area as directed 1 (One) Time.     • [DISCONTINUED] azelastine (ASTELIN) 0.1 %  nasal spray      • [DISCONTINUED] dicyclomine (BENTYL) 20 MG tablet      • [DISCONTINUED] doxycycline (MONODOX) 100 MG capsule Take 1 capsule by mouth 2 (Two) Times a Day. 20 capsule 0   • [DISCONTINUED] doxycycline (MONODOX) 100 MG capsule Take 1 capsule by mouth 2 (Two) Times a Day. 14 capsule 0   • [DISCONTINUED] esomeprazole (nexIUM) 40 MG capsule      • [DISCONTINUED] esomeprazole (nexIUM) 40 MG capsule      • [DISCONTINUED] fluticasone (FLONASE) 50 MCG/ACT nasal spray 2 sprays into the nostril(s) as directed by provider Daily. 2 sprays per nostril. ( Abstracted from [facility]. )     • [DISCONTINUED] HYDROcodone-acetaminophen (NORCO) 5-325 MG per tablet Take 1 tablet by mouth Every 6 (Six) Hours As Needed for Moderate Pain  or Severe Pain . 12 tablet 0   • [DISCONTINUED] lisinopril (PRINIVIL,ZESTRIL) 20 MG tablet      • [DISCONTINUED] methylPREDNISolone (MEDROL, VIJAY,) 4 MG tablet Use as directed by package instructions 21 tablet 0   • [DISCONTINUED] vitamin D (ERGOCALCIFEROL) 1.25 MG (95478 UT) capsule capsule Take 50,000 Units by mouth 1 (One) Time Per Week.       No current facility-administered medications on file prior to visit.      Patient Active Problem List   Diagnosis   • Neck strain, initial encounter   • Abdominal pain, right lower quadrant   • Anxiety disorder   • Autistic disorder   • Carpal tunnel syndrome   • Constipation   • Depression   • Dyslexia   • Elevated rheumatoid factor   • Hypertension   • Near syncope   • IBS (irritable bowel syndrome)   • Low back pain   • Encounter for general adult medical examination without abnormal findings   • Vitamin D deficiency   • Tachycardia   • Vitamin B12 deficiency   • Fatigue   • Acute pain of left knee   • Acute non-recurrent maxillary sinusitis   • Cellulitis of left thigh   • Tick bite       The following portions of the patient's history were reviewed and updated as appropriate: allergies, current medications, past family history, past medical  history, past social history, past surgical history and problem list.    Review of Systems   Constitutional: Negative for chills, fatigue and fever.   HENT: Positive for sore throat. Negative for congestion and sinus pressure.    Eyes: Negative for blurred vision.   Respiratory: Negative for cough and shortness of breath.    Cardiovascular: Negative for chest pain and palpitations.   Gastrointestinal: Positive for nausea and vomiting. Negative for abdominal pain.   Endocrine: Negative for polyuria.   Musculoskeletal: Negative for neck pain.   Skin: Negative for rash.   Neurological: Negative for dizziness and headache.   Hematological: Negative for adenopathy.   Psychiatric/Behavioral: Negative for depressed mood.       Objective   /75 (BP Location: Left arm, Patient Position: Sitting, Cuff Size: Adult)   Pulse 81   Temp 99.5 °F (37.5 °C)   Wt 98.4 kg (217 lb)   SpO2 96%   BMI 29.43 kg/m²   Physical Exam   Constitutional: He is oriented to person, place, and time. He appears well-developed. No distress.   HENT:   Head: Normocephalic.   Mouth/Throat: Posterior oropharyngeal erythema present.   Eyes: Conjunctivae and lids are normal.   Neck: Trachea normal. No thyroid mass and no thyromegaly present.   Cardiovascular: Normal rate, regular rhythm and normal heart sounds.   Pulmonary/Chest: Effort normal and breath sounds normal.   Lymphadenopathy:     He has no cervical adenopathy.   Neurological: He is alert and oriented to person, place, and time.   Skin: Skin is warm and dry.   Psychiatric: He has a normal mood and affect. His speech is normal and behavior is normal. He is attentive.       No visits with results within 1 Week(s) from this visit.   Latest known visit with results is:   Admission on 05/06/2020, Discharged on 05/06/2020   Component Date Value Ref Range Status   • Color,  05/06/2020 Dark Yellow* Yellow, Straw Final   • Appearance,  05/06/2020 Clear  Clear Final   • pH,  05/06/2020 5.5   5.0 - 8.0 Final   • Specific Gravity, UA 05/06/2020 1.029  1.005 - 1.030 Final   • Glucose, UA 05/06/2020 Negative  Negative Final   • Ketones, UA 05/06/2020 Trace* Negative Final   • Bilirubin, UA 05/06/2020 Negative  Negative Final   • Blood, UA 05/06/2020 Negative  Negative Final   • Protein, UA 05/06/2020 Negative  Negative Final   • Leuk Esterase, UA 05/06/2020 Negative  Negative Final   • Nitrite, UA 05/06/2020 Negative  Negative Final   • Urobilinogen, UA 05/06/2020 0.2 E.U./dL  0.2 - 1.0 E.U./dL Final   • Chlamydia DNA by PCR 05/06/2020 Not Detected  Not Detected Final   • Neisseria gonorrhoeae by PCR 05/06/2020 Not Detected  Not Detected Final           Assessment/Plan   Davian was seen today for jaw pain.    Diagnoses and all orders for this visit:    Pharyngitis, unspecified etiology  -     azithromycin (Zithromax Z-Savage) 250 MG tablet; Take 2 tablets the first day, then 1 tablet daily for 4 days.

## 2020-08-06 RX ORDER — LISINOPRIL 20 MG/1
TABLET ORAL
Qty: 90 TABLET | Refills: 1 | Status: SHIPPED | OUTPATIENT
Start: 2020-08-06 | End: 2021-02-05

## 2020-10-07 ENCOUNTER — OFFICE (OUTPATIENT)
Dept: URBAN - METROPOLITAN AREA CLINIC 64 | Facility: CLINIC | Age: 35
End: 2020-10-07
Payer: MEDICAID

## 2020-10-07 VITALS
SYSTOLIC BLOOD PRESSURE: 104 MMHG | DIASTOLIC BLOOD PRESSURE: 68 MMHG | WEIGHT: 226 LBS | HEART RATE: 67 BPM | HEIGHT: 72 IN

## 2020-10-07 DIAGNOSIS — K59.00 CONSTIPATION, UNSPECIFIED: ICD-10-CM

## 2020-10-07 DIAGNOSIS — K62.5 HEMORRHAGE OF ANUS AND RECTUM: ICD-10-CM

## 2020-10-07 DIAGNOSIS — K58.9 IRRITABLE BOWEL SYNDROME WITHOUT DIARRHEA: ICD-10-CM

## 2020-10-07 PROCEDURE — 99213 OFFICE O/P EST LOW 20 MIN: CPT | Performed by: NURSE PRACTITIONER

## 2020-11-02 ENCOUNTER — OFFICE (OUTPATIENT)
Dept: URBAN - METROPOLITAN AREA CLINIC 64 | Facility: CLINIC | Age: 35
End: 2020-11-02
Payer: MEDICAID

## 2020-11-02 VITALS
HEART RATE: 81 BPM | SYSTOLIC BLOOD PRESSURE: 102 MMHG | WEIGHT: 230 LBS | HEIGHT: 72 IN | DIASTOLIC BLOOD PRESSURE: 62 MMHG

## 2020-11-02 DIAGNOSIS — K58.9 IRRITABLE BOWEL SYNDROME WITHOUT DIARRHEA: ICD-10-CM

## 2020-11-02 DIAGNOSIS — R10.31 RIGHT LOWER QUADRANT PAIN: ICD-10-CM

## 2020-11-02 PROCEDURE — 99213 OFFICE O/P EST LOW 20 MIN: CPT | Performed by: INTERNAL MEDICINE

## 2020-11-25 ENCOUNTER — HOSPITAL ENCOUNTER (EMERGENCY)
Facility: HOSPITAL | Age: 35
Discharge: HOME | End: 2020-11-25
Admitting: EMERGENCY MEDICINE

## 2020-11-25 ENCOUNTER — APPOINTMENT (OUTPATIENT)
Dept: CT IMAGING | Facility: HOSPITAL | Age: 35
End: 2020-11-25

## 2020-11-25 VITALS
SYSTOLIC BLOOD PRESSURE: 141 MMHG | BODY MASS INDEX: 29.12 KG/M2 | HEIGHT: 72 IN | OXYGEN SATURATION: 100 % | TEMPERATURE: 98.6 F | HEART RATE: 70 BPM | RESPIRATION RATE: 18 BRPM | WEIGHT: 215 LBS | DIASTOLIC BLOOD PRESSURE: 85 MMHG

## 2020-11-25 DIAGNOSIS — N20.1 URETEROLITHIASIS: ICD-10-CM

## 2020-11-25 DIAGNOSIS — R10.31 RIGHT LOWER QUADRANT ABDOMINAL PAIN: Primary | ICD-10-CM

## 2020-11-25 LAB
ALBUMIN SERPL-MCNC: 4.5 G/DL (ref 3.5–5.2)
ALBUMIN/GLOB SERPL: 2.3 G/DL
ALP SERPL-CCNC: 68 U/L (ref 39–117)
ALT SERPL W P-5'-P-CCNC: 29 U/L (ref 1–41)
ANION GAP SERPL CALCULATED.3IONS-SCNC: 12 MMOL/L (ref 5–15)
AST SERPL-CCNC: 19 U/L (ref 1–40)
BACTERIA UR QL AUTO: ABNORMAL /HPF
BASOPHILS # BLD AUTO: 0 10*3/MM3 (ref 0–0.2)
BASOPHILS NFR BLD AUTO: 0.2 % (ref 0–1.5)
BILIRUB SERPL-MCNC: 0.5 MG/DL (ref 0–1.2)
BILIRUB UR QL STRIP: NEGATIVE
BUN SERPL-MCNC: 18 MG/DL (ref 6–20)
BUN/CREAT SERPL: 19.8 (ref 7–25)
CALCIUM SPEC-SCNC: 8.6 MG/DL (ref 8.6–10.5)
CHLORIDE SERPL-SCNC: 101 MMOL/L (ref 98–107)
CLARITY UR: ABNORMAL
CO2 SERPL-SCNC: 21 MMOL/L (ref 22–29)
COLOR UR: ABNORMAL
CREAT SERPL-MCNC: 0.91 MG/DL (ref 0.76–1.27)
DEPRECATED RDW RBC AUTO: 38.1 FL (ref 37–54)
EOSINOPHIL # BLD AUTO: 0 10*3/MM3 (ref 0–0.4)
EOSINOPHIL NFR BLD AUTO: 0.2 % (ref 0.3–6.2)
ERYTHROCYTE [DISTWIDTH] IN BLOOD BY AUTOMATED COUNT: 12.5 % (ref 12.3–15.4)
GFR SERPL CREATININE-BSD FRML MDRD: 94 ML/MIN/1.73
GLOBULIN UR ELPH-MCNC: 2 GM/DL
GLUCOSE SERPL-MCNC: 143 MG/DL (ref 65–99)
GLUCOSE UR STRIP-MCNC: NEGATIVE MG/DL
HCT VFR BLD AUTO: 44.8 % (ref 37.5–51)
HGB BLD-MCNC: 15 G/DL (ref 13–17.7)
HGB UR QL STRIP.AUTO: ABNORMAL
HYALINE CASTS UR QL AUTO: ABNORMAL /LPF
KETONES UR QL STRIP: ABNORMAL
LEUKOCYTE ESTERASE UR QL STRIP.AUTO: ABNORMAL
LIPASE SERPL-CCNC: 33 U/L (ref 13–60)
LYMPHOCYTES # BLD AUTO: 1.2 10*3/MM3 (ref 0.7–3.1)
LYMPHOCYTES NFR BLD AUTO: 7.9 % (ref 19.6–45.3)
MCH RBC QN AUTO: 29.3 PG (ref 26.6–33)
MCHC RBC AUTO-ENTMCNC: 33.4 G/DL (ref 31.5–35.7)
MCV RBC AUTO: 87.7 FL (ref 79–97)
MONOCYTES # BLD AUTO: 0.4 10*3/MM3 (ref 0.1–0.9)
MONOCYTES NFR BLD AUTO: 2.9 % (ref 5–12)
NEUTROPHILS NFR BLD AUTO: 13 10*3/MM3 (ref 1.7–7)
NEUTROPHILS NFR BLD AUTO: 88.8 % (ref 42.7–76)
NITRITE UR QL STRIP: NEGATIVE
NRBC BLD AUTO-RTO: 0 /100 WBC (ref 0–0.2)
PH UR STRIP.AUTO: 6 [PH] (ref 5–8)
PLATELET # BLD AUTO: 385 10*3/MM3 (ref 140–450)
PMV BLD AUTO: 7.3 FL (ref 6–12)
POTASSIUM SERPL-SCNC: 3.4 MMOL/L (ref 3.5–5.2)
PROT SERPL-MCNC: 6.5 G/DL (ref 6–8.5)
PROT UR QL STRIP: ABNORMAL
RBC # BLD AUTO: 5.1 10*6/MM3 (ref 4.14–5.8)
RBC # UR: ABNORMAL /HPF
REF LAB TEST METHOD: ABNORMAL
SODIUM SERPL-SCNC: 134 MMOL/L (ref 136–145)
SP GR UR STRIP: 1.07 (ref 1–1.03)
SQUAMOUS #/AREA URNS HPF: ABNORMAL /HPF
UROBILINOGEN UR QL STRIP: ABNORMAL
WBC # BLD AUTO: 14.7 10*3/MM3 (ref 3.4–10.8)
WBC UR QL AUTO: ABNORMAL /HPF
YEAST URNS QL MICRO: ABNORMAL /HPF

## 2020-11-25 PROCEDURE — 80053 COMPREHEN METABOLIC PANEL: CPT | Performed by: NURSE PRACTITIONER

## 2020-11-25 PROCEDURE — 74177 CT ABD & PELVIS W/CONTRAST: CPT

## 2020-11-25 PROCEDURE — 85025 COMPLETE CBC W/AUTO DIFF WBC: CPT | Performed by: NURSE PRACTITIONER

## 2020-11-25 PROCEDURE — 96374 THER/PROPH/DIAG INJ IV PUSH: CPT

## 2020-11-25 PROCEDURE — 81001 URINALYSIS AUTO W/SCOPE: CPT | Performed by: NURSE PRACTITIONER

## 2020-11-25 PROCEDURE — 0 IOPAMIDOL PER 1 ML: Performed by: NURSE PRACTITIONER

## 2020-11-25 PROCEDURE — 25010000002 ONDANSETRON PER 1 MG: Performed by: NURSE PRACTITIONER

## 2020-11-25 PROCEDURE — 99284 EMERGENCY DEPT VISIT MOD MDM: CPT

## 2020-11-25 PROCEDURE — 87086 URINE CULTURE/COLONY COUNT: CPT | Performed by: NURSE PRACTITIONER

## 2020-11-25 PROCEDURE — 25010000002 MORPHINE PER 10 MG: Performed by: NURSE PRACTITIONER

## 2020-11-25 PROCEDURE — 83690 ASSAY OF LIPASE: CPT | Performed by: NURSE PRACTITIONER

## 2020-11-25 PROCEDURE — 96375 TX/PRO/DX INJ NEW DRUG ADDON: CPT

## 2020-11-25 RX ORDER — SULFAMETHOXAZOLE AND TRIMETHOPRIM 800; 160 MG/1; MG/1
1 TABLET ORAL ONCE
Status: COMPLETED | OUTPATIENT
Start: 2020-11-25 | End: 2020-11-25

## 2020-11-25 RX ORDER — MORPHINE SULFATE 4 MG/ML
4 INJECTION, SOLUTION INTRAMUSCULAR; INTRAVENOUS ONCE
Status: COMPLETED | OUTPATIENT
Start: 2020-11-25 | End: 2020-11-25

## 2020-11-25 RX ORDER — HYDROCODONE BITARTRATE AND ACETAMINOPHEN 5; 325 MG/1; MG/1
1 TABLET ORAL ONCE AS NEEDED
Status: DISCONTINUED | OUTPATIENT
Start: 2020-11-25 | End: 2020-11-25 | Stop reason: HOSPADM

## 2020-11-25 RX ORDER — SODIUM CHLORIDE 0.9 % (FLUSH) 0.9 %
10 SYRINGE (ML) INJECTION AS NEEDED
Status: DISCONTINUED | OUTPATIENT
Start: 2020-11-25 | End: 2020-11-25 | Stop reason: HOSPADM

## 2020-11-25 RX ORDER — HYDROCODONE BITARTRATE AND ACETAMINOPHEN 5; 325 MG/1; MG/1
1 TABLET ORAL EVERY 6 HOURS PRN
Qty: 12 TABLET | Refills: 0 | Status: SHIPPED | OUTPATIENT
Start: 2020-11-25 | End: 2020-12-14

## 2020-11-25 RX ORDER — SULFAMETHOXAZOLE AND TRIMETHOPRIM 800; 160 MG/1; MG/1
1 TABLET ORAL 2 TIMES DAILY
Qty: 14 TABLET | Refills: 0 | Status: SHIPPED | OUTPATIENT
Start: 2020-11-25 | End: 2020-12-11

## 2020-11-25 RX ORDER — ONDANSETRON 2 MG/ML
4 INJECTION INTRAMUSCULAR; INTRAVENOUS ONCE
Status: COMPLETED | OUTPATIENT
Start: 2020-11-25 | End: 2020-11-25

## 2020-11-25 RX ADMIN — IOPAMIDOL 100 ML: 755 INJECTION, SOLUTION INTRAVENOUS at 16:17

## 2020-11-25 RX ADMIN — MORPHINE SULFATE 4 MG: 4 INJECTION INTRAVENOUS at 15:11

## 2020-11-25 RX ADMIN — SODIUM CHLORIDE 500 ML: 0.9 INJECTION, SOLUTION INTRAVENOUS at 15:11

## 2020-11-25 RX ADMIN — HYDROCODONE BITARTRATE AND ACETAMINOPHEN 1 TABLET: 5; 325 TABLET ORAL at 18:47

## 2020-11-25 RX ADMIN — SULFAMETHOXAZOLE AND TRIMETHOPRIM 160 MG: 800; 160 TABLET ORAL at 18:47

## 2020-11-25 RX ADMIN — ONDANSETRON HYDROCHLORIDE 4 MG: 2 SOLUTION INTRAMUSCULAR; INTRAVENOUS at 15:11

## 2020-11-25 NOTE — DISCHARGE INSTRUCTIONS
Please take all antibiotics as prescribed  Return the ED for worsening symptoms: Fever, vomiting, severe pain  Given your history of constipation, and the pain medicine prescribed, please take a stool softener of choice  Follow-up with your primary care provider  Please follow-up with urology as above, call Monday for an appointment

## 2020-11-25 NOTE — ED PROVIDER NOTES
"Subjective   Patient presents with:  Abdominal Pain      Onset: Today  Provocative Factors: Worse with food and movement  Palliative Factors: None  Quality: Sharp   region: Right lower quadrant  Severity: Moderate  Timing: Insistent  Patient is a 35-year-old male presents emergency department with complaint of right lower quadrant abdominal pain onset today.  He also states \"I think there may be blood in the urine, stool or both\".  Patient denies any fever or chills.  Denies vomiting.  No hematemesis.  Denies dark stools.  He does report he has a history of hemorrhoids and this may be the cause of blood in his stool, he has had this in the past and has seen gastroenterology.  He is also had right lower quadrant pain in the past and seen gastroenterology.  He denies any fever.  No pain or discomfort urination.  No testicular pain.            Review of Systems   Constitutional: Negative for chills and fever.   HENT: Negative for congestion and rhinorrhea.    Eyes: Negative for photophobia and visual disturbance.   Respiratory: Negative for cough, chest tightness and shortness of breath.    Cardiovascular: Negative for chest pain, palpitations and leg swelling.   Gastrointestinal: Positive for abdominal pain, blood in stool and nausea. Negative for constipation, diarrhea and vomiting.   Genitourinary: Positive for hematuria. Negative for difficulty urinating, discharge, dysuria, flank pain, frequency, penile pain, testicular pain and urgency.   Musculoskeletal: Negative for back pain and neck pain.   Skin: Negative for color change and rash.   Neurological: Negative for headaches.       Past Medical History:   Diagnosis Date   • Abdominal pain, chronic, right lower quadrant 01/30/2015   • Anxiety    • Autism 08/13/2014    mild ( Abstracted from [facility]   • Carpal tunnel syndrome 02/22/2018   • Constipation 09/21/2018   • Depression    • Dyslexia 12/26/2017   • Hypertension    • IBS (irritable bowel syndrome) " 09/21/2018   • IBS (irritable bowel syndrome)    • Knee pain, left 07/25/2017   • Lower back pain 01/30/2015   • Right cervical radiculopathy 01/07/2019   • Shoulder pain, right 02/14/2017   • Tinnitus    • Vitamin D deficiency 12/26/2017       Allergies   Allergen Reactions   • Ceclor [Cefaclor] Hives   • Amoxicillin Hives   • Cephalexin Hives   • Cloral Hives       Past Surgical History:   Procedure Laterality Date   • ADENOIDECTOMY     • COLONOSCOPY     • TONSILLECTOMY  1993    Tonsillectomy and Adenoids-   • TONSILLECTOMY AND ADENOIDECTOMY         Family History   Problem Relation Age of Onset   • Cancer Mother    • Cancer Father    • Hypertension Father    • Stroke Father    • Hyperlipidemia Father    • Hypertension Mother    • Atrial fibrillation Mother    • Diabetes Paternal Uncle    • Diabetes Maternal Grandmother    • Stroke Maternal Grandfather    • Diabetes Maternal Grandfather    • Stroke Other        Social History     Socioeconomic History   • Marital status: Single     Spouse name: Not on file   • Number of children: Not on file   • Years of education: Not on file   • Highest education level: Not on file   Tobacco Use   • Smoking status: Never Smoker   • Smokeless tobacco: Never Used   Substance and Sexual Activity   • Alcohol use: Never     Frequency: Never   • Drug use: No   Social History Narrative    [employer], [employer]ates                Objective   Physical Exam  Vitals signs and nursing note reviewed.   Constitutional:       General: He is not in acute distress.     Appearance: He is well-developed. He is not ill-appearing, toxic-appearing or diaphoretic.   HENT:      Head: Normocephalic and atraumatic.      Mouth/Throat:      Mouth: Mucous membranes are moist.      Pharynx: Oropharynx is clear.   Eyes:      Extraocular Movements: Extraocular movements intact.      Pupils: Pupils are equal, round, and reactive to light.   Cardiovascular:      Rate and Rhythm: Normal rate and regular rhythm.      " Heart sounds: No murmur. No friction rub. No gallop.    Pulmonary:      Effort: Pulmonary effort is normal.      Breath sounds: Normal breath sounds.   Abdominal:      General: Bowel sounds are normal. There is no distension or abdominal bruit. There are no signs of injury.      Palpations: Abdomen is soft.   Skin:     General: Skin is warm and dry.      Capillary Refill: Capillary refill takes less than 2 seconds.      Findings: No rash.   Neurological:      General: No focal deficit present.      Mental Status: He is alert and oriented to person, place, and time.   Psychiatric:         Mood and Affect: Mood normal.         Behavior: Behavior normal.         Procedures           ED Course  /85   Pulse 70   Temp 98.6 °F (37 °C) (Oral)   Resp 18   Ht 182.9 cm (72\")   Wt 97.5 kg (215 lb)   SpO2 100%   BMI 29.16 kg/m²   Labs Reviewed   COMPREHENSIVE METABOLIC PANEL - Abnormal; Notable for the following components:       Result Value    Glucose 143 (*)     Sodium 134 (*)     Potassium 3.4 (*)     CO2 21.0 (*)     All other components within normal limits    Narrative:     GFR Normal >60  Chronic Kidney Disease <60  Kidney Failure <15     URINALYSIS W/ CULTURE IF INDICATED - Abnormal; Notable for the following components:    Color, UA Other (*)     Appearance, UA Turbid (*)     Specific Gravity, UA 1.070 (*)     Ketones, UA 80 mg/dL (3+) (*)     Blood, UA Large (3+) (*)     Protein,  mg/dL (2+) (*)     Leuk Esterase, UA Small (1+) (*)     All other components within normal limits   CBC WITH AUTO DIFFERENTIAL - Abnormal; Notable for the following components:    WBC 14.70 (*)     Neutrophil % 88.8 (*)     Lymphocyte % 7.9 (*)     Monocyte % 2.9 (*)     Eosinophil % 0.2 (*)     Neutrophils, Absolute 13.00 (*)     All other components within normal limits   URINALYSIS, MICROSCOPIC ONLY - Abnormal; Notable for the following components:    RBC, UA Too Numerous to Count (*)     WBC, UA 6-12 (*)     All other " components within normal limits   LIPASE - Normal   URINE CULTURE   CBC AND DIFFERENTIAL    Narrative:     The following orders were created for panel order CBC & Differential.  Procedure                               Abnormality         Status                     ---------                               -----------         ------                     CBC Auto Differential[[id number]]        Abnormal            Final result                 Please view results for these tests on the individual orders.     Medications   sodium chloride 0.9 % bolus 500 mL (0 mL Intravenous Stopped 11/25/20 1542)   ondansetron (ZOFRAN) injection 4 mg (4 mg Intravenous Given 11/25/20 1511)   Morphine sulfate (PF) injection 4 mg (4 mg Intravenous Given 11/25/20 1511)   iopamidol (ISOVUE-370) 76 % injection 100 mL (100 mL Intravenous Given 11/25/20 1617)   sulfamethoxazole-trimethoprim (BACTRIM DS,SEPTRA DS) 800-160 MG per tablet 160 mg (160 mg Oral Given 11/25/20 1847)     Ct Abdomen Pelvis With Contrast    Result Date: 11/25/2020   1. 3 mm right ureteropelvic junction stone with mild right hydronephrosis. 2. Normal appendix.    Electronically Signed By-Lauren Zimmerman MD On:11/25/2020 4:48 PM This report was finalized on [id number] by  Lauren Zimmerman MD.      ED Course as of Nov 25 2157   Wed Nov 25, 2020 1815 SPOKE WITH DR. LANDRY, PATIENT WILL BE STARTED ON ANTIBIOTIC, AND CALL OFFICE FOR FOLLOW UP NEXT WEEK.     [LB]      ED Course User Index  [LB] Rosalia Chairez, APRN      Appropriate PPE was worn during the duration of the care for this patient while in the emergency department per [facility] guidelines     Patient INSPECT report queried and reviewed.  I discussed with the patient the risk and benefits associated with opiate/narcotic pain medication today.  Based on patient's exam findings and assessment, I do feel it appropriate to prescribe a short course of opiate/ narcotic pain medication from the emergency  department.  The patient was advised of the risks associated with such medication, including risk of dependency.  Patient was advised of medication administration instructions, appropriate use, potential side effects and adverse reactions.  Acknowledged and verbalized understanding, and agrees to treatment.                                MDM  Number of Diagnoses or Management Options  Right lower quadrant abdominal pain:   Ureterolithiasis:   Diagnosis management comments: Differentials: Appendicitis, ureterolithiasis, constipation, mesenteric adenitis  This list is not all inclusive and does not constitute the entireity of considered causes.     Labs reviewed by me and significant for the following: Abnormal Labs Reviewed  COMPREHENSIVE METABOLIC PANEL - Abnormal; Notable for the following components:     Glucose                       143 (*)                Sodium                        134 (*)                Potassium                     3.4 (*)                CO2                           21.0 (*)            All other components within normal limits         Narrative: GFR Normal >60                  Chronic Kidney Disease <60                  Kidney Failure <15                    URINALYSIS W/ CULTURE IF INDICATED - Abnormal; Notable for the following components:     Color, UA                     Other (*)               Appearance, UA                Turbid (*)               Specific [place], UA          1.070 (*)               Ketones, UA                     (*)                  Blood, UA                     Large (3+) (*)               Protein, UA                     (*)                  Leuk Esterase, UA             Small (1+) (*)            All other components within normal limits  CBC WITH AUTO DIFFERENTIAL - Abnormal; Notable for the following components:     WBC                           14.70 (*)               Neutrophil %                  88.8 (*)               Lymphocyte %                  7.9 (*)                 Monocyte %                    2.9 (*)                Eosinophil %                  0.2 (*)                Neutrophils, Absolute         13.00 (*)            All other components within normal limits  URINALYSIS, MICROSCOPIC ONLY - Abnormal; Notable for the following components:     RBC, UA                         (*)                  WBC, UA                       6-12 (*)            All other components within normal limits    Bedside hemoccult negative.       Ct Abdomen Pelvis With Contrast    Result Date: 11/25/2020   1. 3 mm right ureteropelvic junction stone with mild right hydronephrosis. 2. Normal appendix.    Electronically Signed By-Lauren Zimmerman MD On:11/25/2020 4:48 PM This report was finalized on [id number] by  Lauren Zimmerman MD.        Imaging, Interpreted per radiologist, independently viewed by myself:     Patient was brought back to the emergency department room for evaluation and  placed on appropriate monitoring.   IV was established, blood work obtained.  Vital signs have remained non-concerning, afebrile.    Patient is a 35-year-old male presents to the emergency department with right lower quadrant abdominal pain.  He underwent the above exam and work-up.  He has been found to have a 3 mm right UPJ stone, mild right hydronephrosis.  His appendix was normal.  The patient was given morphine here in the ED, as well as a Norco, had improvement in his pain.  He is not had any vomiting.  I consulted with urology, and the patient is ready placed on Bactrim, he is going to call Monday for a follow-up appointment.  He was given a dose of antibiotic prior to leaving the emergency department.    Plan and Disposition: I spoke with the patient at the bedside regarding their plan of care, discharge instruction, home care, prescriptions, and importance follow-up.  We discussed test results at the bedside.  Patient was made aware of indications to return to the emergency department.  Patient agrees  with the current plan of care for discharge, verbalized understanding of all instructions    Pt is aware that discharge does not mean that nothing is wrong but it indicates no emergency is present and they must continue care with follow-up as given below or physician of their choice           Amount and/or Complexity of Data Reviewed  Clinical lab tests: reviewed  Tests in the radiology section of CPT®: reviewed  Review and summarize past medical records: (The patient's gastroenterology note.  He was seen on 11/2/2020.  At that time he was evaluated for right lower quadrant abdominal pain.  Was advised for follow-up in 6 months.  Diagnosed with IBS with constipation.)    Patient Progress  Patient progress: stable      Final diagnoses:   Right lower quadrant abdominal pain   Ureterolithiasis            Rosalia Chairez, APRN  11/25/20 2156       Rosalia Chairez, APRN  11/25/20 2157

## 2020-11-26 LAB — BACTERIA SPEC AEROBE CULT: NO GROWTH

## 2020-12-10 ENCOUNTER — APPOINTMENT (OUTPATIENT)
Dept: CT IMAGING | Facility: HOSPITAL | Age: 35
End: 2020-12-10

## 2020-12-10 ENCOUNTER — HOSPITAL ENCOUNTER (EMERGENCY)
Facility: HOSPITAL | Age: 35
Discharge: HOME | End: 2020-12-10
Attending: EMERGENCY MEDICINE | Admitting: EMERGENCY MEDICINE

## 2020-12-10 VITALS
RESPIRATION RATE: 17 BRPM | BODY MASS INDEX: 29.12 KG/M2 | HEART RATE: 76 BPM | DIASTOLIC BLOOD PRESSURE: 85 MMHG | WEIGHT: 215 LBS | TEMPERATURE: 98.2 F | SYSTOLIC BLOOD PRESSURE: 132 MMHG | OXYGEN SATURATION: 99 % | HEIGHT: 72 IN

## 2020-12-10 DIAGNOSIS — N20.1 URETERAL CALCULUS: Primary | ICD-10-CM

## 2020-12-10 DIAGNOSIS — N23 URETERAL COLIC: ICD-10-CM

## 2020-12-10 LAB
BACTERIA UR QL AUTO: ABNORMAL /HPF
BILIRUB UR QL STRIP: NEGATIVE
CLARITY UR: ABNORMAL
COLOR UR: YELLOW
GLUCOSE UR STRIP-MCNC: NEGATIVE MG/DL
HGB UR QL STRIP.AUTO: ABNORMAL
HYALINE CASTS UR QL AUTO: ABNORMAL /LPF
KETONES UR QL STRIP: ABNORMAL
LEUKOCYTE ESTERASE UR QL STRIP.AUTO: NEGATIVE
NITRITE UR QL STRIP: NEGATIVE
PH UR STRIP.AUTO: 7 [PH] (ref 5–8)
PROT UR QL STRIP: NEGATIVE
RBC # UR: ABNORMAL /HPF
REF LAB TEST METHOD: ABNORMAL
SP GR UR STRIP: 1.03 (ref 1–1.03)
SQUAMOUS #/AREA URNS HPF: ABNORMAL /HPF
UROBILINOGEN UR QL STRIP: ABNORMAL
WBC UR QL AUTO: ABNORMAL /HPF

## 2020-12-10 PROCEDURE — 25010000002 ONDANSETRON PER 1 MG: Performed by: EMERGENCY MEDICINE

## 2020-12-10 PROCEDURE — 25010000002 KETOROLAC TROMETHAMINE PER 15 MG: Performed by: EMERGENCY MEDICINE

## 2020-12-10 PROCEDURE — 74176 CT ABD & PELVIS W/O CONTRAST: CPT

## 2020-12-10 PROCEDURE — 96376 TX/PRO/DX INJ SAME DRUG ADON: CPT

## 2020-12-10 PROCEDURE — 96374 THER/PROPH/DIAG INJ IV PUSH: CPT

## 2020-12-10 PROCEDURE — 99283 EMERGENCY DEPT VISIT LOW MDM: CPT

## 2020-12-10 PROCEDURE — 25010000002 MORPHINE PER 10 MG: Performed by: EMERGENCY MEDICINE

## 2020-12-10 PROCEDURE — 81001 URINALYSIS AUTO W/SCOPE: CPT | Performed by: EMERGENCY MEDICINE

## 2020-12-10 PROCEDURE — 96375 TX/PRO/DX INJ NEW DRUG ADDON: CPT

## 2020-12-10 RX ORDER — ONDANSETRON 4 MG/1
4 TABLET, ORALLY DISINTEGRATING ORAL EVERY 8 HOURS PRN
Qty: 12 TABLET | Refills: 0 | Status: SHIPPED | OUTPATIENT
Start: 2020-12-10 | End: 2020-12-29

## 2020-12-10 RX ORDER — MORPHINE SULFATE 4 MG/ML
4 INJECTION, SOLUTION INTRAMUSCULAR; INTRAVENOUS ONCE
Status: COMPLETED | OUTPATIENT
Start: 2020-12-10 | End: 2020-12-10

## 2020-12-10 RX ORDER — SODIUM CHLORIDE 0.9 % (FLUSH) 0.9 %
10 SYRINGE (ML) INJECTION AS NEEDED
Status: DISCONTINUED | OUTPATIENT
Start: 2020-12-10 | End: 2020-12-10 | Stop reason: HOSPADM

## 2020-12-10 RX ORDER — ONDANSETRON 2 MG/ML
4 INJECTION INTRAMUSCULAR; INTRAVENOUS ONCE
Status: COMPLETED | OUTPATIENT
Start: 2020-12-10 | End: 2020-12-10

## 2020-12-10 RX ORDER — TRAMADOL HYDROCHLORIDE 50 MG/1
50 TABLET ORAL EVERY 6 HOURS PRN
Qty: 12 TABLET | Refills: 0 | Status: SHIPPED | OUTPATIENT
Start: 2020-12-10 | End: 2020-12-29

## 2020-12-10 RX ORDER — KETOROLAC TROMETHAMINE 30 MG/ML
30 INJECTION, SOLUTION INTRAMUSCULAR; INTRAVENOUS ONCE
Status: COMPLETED | OUTPATIENT
Start: 2020-12-10 | End: 2020-12-10

## 2020-12-10 RX ADMIN — MORPHINE SULFATE 4 MG: 4 INJECTION INTRAVENOUS at 20:59

## 2020-12-10 RX ADMIN — ONDANSETRON 4 MG: 2 INJECTION, SOLUTION INTRAMUSCULAR; INTRAVENOUS at 19:34

## 2020-12-10 RX ADMIN — KETOROLAC TROMETHAMINE 30 MG: 30 INJECTION, SOLUTION INTRAMUSCULAR at 20:55

## 2020-12-10 RX ADMIN — MORPHINE SULFATE 4 MG: 4 INJECTION INTRAVENOUS at 19:34

## 2020-12-11 ENCOUNTER — OFFICE VISIT (OUTPATIENT)
Dept: FAMILY MEDICINE CLINIC | Facility: CLINIC | Age: 35
End: 2020-12-11

## 2020-12-11 VITALS
BODY MASS INDEX: 29.16 KG/M2 | TEMPERATURE: 98 F | WEIGHT: 215 LBS | DIASTOLIC BLOOD PRESSURE: 78 MMHG | SYSTOLIC BLOOD PRESSURE: 115 MMHG | HEART RATE: 88 BPM | OXYGEN SATURATION: 97 %

## 2020-12-11 DIAGNOSIS — R11.0 NAUSEA: ICD-10-CM

## 2020-12-11 DIAGNOSIS — N20.0 RIGHT RENAL STONE: Primary | ICD-10-CM

## 2020-12-11 PROCEDURE — 99213 OFFICE O/P EST LOW 20 MIN: CPT | Performed by: FAMILY MEDICINE

## 2020-12-11 RX ORDER — TAMSULOSIN HYDROCHLORIDE 0.4 MG/1
1 CAPSULE ORAL DAILY
Qty: 10 CAPSULE | Refills: 0 | Status: SHIPPED | OUTPATIENT
Start: 2020-12-11 | End: 2020-12-29

## 2020-12-11 NOTE — PATIENT INSTRUCTIONS
Kidney Stones  Kidney stones are rock-like masses that form inside of the kidneys. Kidneys are organs that make pee (urine). A kidney stone may move into other parts of the urinary tract, including:  · The tubes that connect the kidneys to the bladder (ureters).  · The bladder.  · The tube that carries urine out of the body (urethra).  Kidney stones can cause very bad pain and can block the flow of pee. The stone usually leaves your body (passes) through your pee. You may need to have a doctor take out the stone.  What are the causes?  Kidney stones may be caused by:  · A condition in which certain glands make too much parathyroid hormone (primary hyperparathyroidism).  · A buildup of a type of crystals in the bladder made of a chemical called uric acid. The body makes uric acid when you eat certain foods.  · Narrowing (stricture) of one or both of the ureters.  · A kidney blockage that you were born with.  · Past surgery on the kidney or the ureters, such as gastric bypass surgery.  What increases the risk?  You are more likely to develop this condition if:  · You have had a kidney stone in the past.  · You have a family history of kidney stones.  · You do not drink enough water.  · You eat a diet that is high in protein, salt (sodium), or sugar.  · You are overweight or very overweight (obese).  What are the signs or symptoms?  Symptoms of a kidney stone may include:  · Pain in the side of the belly, right below the ribs (flank pain). Pain usually spreads (radiates) to the groin.  · Needing to pee often or right away (urgently).  · Pain when going pee (urinating).  · Blood in your pee (hematuria).  · Feeling like you may vomit (nauseous).  · Vomiting.  · Fever and chills.  How is this treated?  Treatment depends on the size, location, and makeup of the kidney stones. The stones will often pass out of the body through peeing. You may need to:  · Drink more fluid to help pass the stone. In some cases, you may be  given fluids through an IV tube put into one of your veins at the hospital.  · Take medicine for pain.  · Make changes in your diet to help keep kidney stones from coming back.  Sometimes, medical procedures are needed to remove a kidney stone. This may involve:  · A procedure to break up kidney stones using a beam of light (laser) or shock waves.  · Surgery to remove the kidney stones.  Follow these instructions at home:  Medicines  · Take over-the-counter and prescription medicines only as told by your doctor.  · Ask your doctor if the medicine prescribed to you requires you to avoid driving or using heavy machinery.  Eating and drinking  · Drink enough fluid to keep your pee pale yellow. You may be told to drink at least 8-10 glasses of water each day. This will help you pass the stone.  · If told by your doctor, change your diet. This may include:  ? Limiting how much salt you eat.  ? Eating more fruits and vegetables.  ? Limiting how much meat, poultry, fish, and eggs you eat.  · Follow instructions from your doctor about eating or drinking restrictions.  General instructions  · Collect pee samples as told by your doctor. You may need to collect a pee sample:  ? 24 hours after a stone comes out.  ? 8-12 weeks after a stone comes out, and every 6-12 months after that.  · Strain your pee every time you pee (urinate), for as long as told. Use the strainer that your doctor recommends.  · Do not throw out the stone. Keep it so that it can be tested by your doctor.  · Keep all follow-up visits as told by your doctor. This is important. You may need follow-up tests.  How is this prevented?  To prevent another kidney stone:  · Drink enough fluid to keep your pee pale yellow. This is the best way to prevent kidney stones.  · Eat healthy foods.  · Avoid certain foods as told by your doctor. You may be told to eat less protein.  · Stay at a healthy weight.  Where to find more information  · National Kidney Foundation  (NKF): www.kidney.org  · [employer] (UCF): www.urologyhealth.org  Contact a doctor if:  · You have pain that gets worse or does not get better with medicine.  Get help right away if:  · You have a fever or chills.  · You get very bad pain.  · You get new pain in your belly (abdomen).  · You pass out (faint).  · You cannot pee.  Summary  · Kidney stones are rock-like masses that form inside of the kidneys.  · Kidney stones can cause very bad pain and can block the flow of pee.  · The stones will often pass out of the body through peeing.  · Drink enough fluid to keep your pee pale yellow.  This information is not intended to replace advice given to you by your health care provider. Make sure you discuss any questions you have with your health care provider.  Document Revised: 05/05/2020 Document Reviewed: 05/05/2020  [employer] Patient Education © 2020 [employer]

## 2020-12-11 NOTE — ED PROVIDER NOTES
Subjective   Patient is a 35-year-old male complaining of right flank pain.  He states he had similar pain 1 week ago when he was diagnosed with a kidney stone.  The pain is moderate and constant.  He complains of nausea as well.          Review of Systems  Negative for headache ears no cough fever chest pain shortness of breath vomiting diarrhea dysuria or other complaint  Past Medical History:   Diagnosis Date   • Abdominal pain, chronic, right lower quadrant 01/30/2015   • Anxiety    • Autism 08/13/2014    mild ( [facility]   • Carpal tunnel syndrome 02/22/2018   • Constipation 09/21/2018   • Depression    • Dyslexia 12/26/2017   • Hypertension    • IBS (irritable bowel syndrome) 09/21/2018   • IBS (irritable bowel syndrome)    • Knee pain, left 07/25/2017   • Lower back pain 01/30/2015   • Right cervical radiculopathy 01/07/2019   • Shoulder pain, right 02/14/2017   • Tinnitus    • Vitamin D deficiency 12/26/2017       Allergies   Allergen Reactions   • Ceclor [Cefaclor] Hives   • Amoxicillin Hives   • Cephalexin Hives   • Cloral Hives       Past Surgical History:   Procedure Laterality Date   • ADENOIDECTOMY     • COLONOSCOPY     • TONSILLECTOMY  1993    Tonsillectomy and Adenoids-   • TONSILLECTOMY AND ADENOIDECTOMY         Family History   Problem Relation Age of Onset   • Cancer Mother    • Cancer Father    • Hypertension Father    • Stroke Father    • Hyperlipidemia Father    • Hypertension Mother    • Atrial fibrillation Mother    • Diabetes Paternal Uncle    • Diabetes Maternal Grandmother    • Stroke Maternal Grandfather    • Diabetes Maternal Grandfather    • Stroke Other        Social History     Socioeconomic History   • Marital status: Single     Spouse name: Not on file   • Number of children: Not on file   • Years of education: Not on file   • Highest education level: Not on file   Tobacco Use   • Smoking status: Never Smoker   • Smokeless tobacco: Never Used   Substance and  Sexual Activity   • Alcohol use: Never     Frequency: Never   • Drug use: No   Social History Narrative    [employer], [employer]ates                Objective   Physical Exam  Lungs are clear.  Heart has regular rhythm without murmur gallop.  Chest is nontender.  Abdomen is soft with no tenderness.  Patient has normal bowel sounds without rebound or guard.  Back has mild right flank tenderness.  Procedures           ED Course      Results for orders placed or performed during the hospital encounter of 12/10/20   Urinalysis With Microscopic If Indicated (No Culture) - Urine, Clean Catch    Specimen: Urine, Clean Catch   Result Value Ref Range    Color, UA Yellow Yellow, Straw    Appearance, UA Cloudy (A) Clear    pH, UA 7.0 5.0 - 8.0    Specific Gravity, UA 1.026 1.005 - 1.030    Glucose, UA Negative Negative    Ketones, UA 80 mg/dL (3+) (A) Negative    Bilirubin, UA Negative Negative    Blood, UA Small (1+) (A) Negative    Protein, UA Negative Negative    Leuk Esterase, UA Negative Negative    Nitrite, UA Negative Negative    Urobilinogen, UA 1.0 E.U./dL 0.2 - 1.0 E.U./dL   Urinalysis, Microscopic Only - Urine, Clean Catch    Specimen: Urine, Clean Catch   Result Value Ref Range    RBC, UA 13-20 (A) None Seen /HPF    WBC, UA 0-2 (A) None Seen /HPF    Bacteria, UA None Seen None Seen /HPF    Squamous Epithelial Cells, UA None Seen None Seen, 0-2 /HPF    Hyaline Casts, UA 0-2 None Seen /LPF    Methodology Automated Microscopy      Ct Abdomen Pelvis Without Contrast    Result Date: 12/10/2020  1. On 11/25/2020, there was a 3 mm stone in the left ureteropelvic junction. Today this stone is in the distal right ureter just above the ureterovesical junction. It is causing mild hydronephrosis and perinephric stranding, very slightly increased from before. There is also mild distention of the right ureter. 2. Otherwise negative study.  Electronically Signed By-Maggie Huff MD On:12/10/2020 8:08 PM This report was finalized on  [id number] by  Maggie Huff MD.                                           MDM  Number of Diagnoses or Management Options  Diagnosis management comments: Patient was given morphine and Zofran with improvement.  He is noted to have ureteral stone that is now at the UV junction.  Patient is improved.  Will be discharged with a prescription for Ultram and Zofran.  Will follow with the urologist on-call.       Amount and/or Complexity of Data Reviewed  Clinical lab tests: reviewed  Tests in the radiology section of CPT®: reviewed    Risk of Complications, Morbidity, and/or Mortality  Presenting problems: high  Diagnostic procedures: high  Management options: high    Patient Progress  Patient progress: stable      Final diagnoses:   Ureteral calculus   Ureteral colic            Kal Weber MD  12/10/20 2041

## 2020-12-11 NOTE — ED NOTES
Patient came in due to right sided flank pain that started to get worse today.  Was seen here about a week ago and was dx with kidney stone but he never followed with urology.       Adilson John RN  12/10/20 1926

## 2020-12-11 NOTE — PROGRESS NOTES
Subjective   Chief Complaint   Patient presents with   • Nausea   • Flank Pain   • Hospital Follow Up Visit     [facility] ER f/u kristina calzada, in ER 11/25 and 12/10     Davian Arevalo is a 35 y.o. male.     He started having right flank pain about a week ago.  He was in the ER last night.    Nausea  This is a new problem. The current episode started in the past 7 days. The problem occurs constantly. The problem has been unchanged. Associated symptoms include abdominal pain and nausea. Pertinent negatives include no chest pain, chills, congestion, coughing, fever, headaches, rash, sore throat or swollen glands. Nothing aggravates the symptoms. He has tried nothing for the symptoms.   Flank Pain  This is a new problem. The current episode started in the past 7 days. The problem occurs constantly. The problem is unchanged. The quality of the pain is described as aching and shooting. The pain is moderate. Associated symptoms include abdominal pain. Pertinent negatives include no chest pain, fever or headaches. He has tried nothing for the symptoms.      Past Medical History:   Diagnosis Date   • Abdominal pain, chronic, right lower quadrant 01/30/2015   • Anxiety    • Autism 08/13/2014    [facility] ( [facility]   • Carpal tunnel syndrome 02/22/2018   • Constipation 09/21/2018   • Depression    • Dyslexia 12/26/2017   • Hypertension    • IBS (irritable bowel syndrome) 09/21/2018   • IBS (irritable bowel syndrome)    • Knee pain, left 07/25/2017   • Lower back pain 01/30/2015   • Right cervical radiculopathy 01/07/2019   • Shoulder pain, right 02/14/2017   • Tinnitus    • Vitamin D deficiency 12/26/2017     Past Surgical History:   Procedure Laterality Date   • ADENOIDECTOMY     • COLONOSCOPY     • TONSILLECTOMY  1993    Tonsillectomy and Adenoids-   • TONSILLECTOMY AND ADENOIDECTOMY       Allergies   Allergen Reactions   • Ceclor [Cefaclor] Hives   • Amoxicillin Hives   • Cephalexin Hives   • Cloral Hives      Social History     Socioeconomic History   • Marital status: Single     Spouse name: Not on file   • Number of children: Not on file   • Years of education: Not on file   • Highest education level: Not on file   Tobacco Use   • Smoking status: Never Smoker   • Smokeless tobacco: Never Used   Substance and Sexual Activity   • Alcohol use: Never     Frequency: Never   • Drug use: No   Social History Narrative    [employer], [employer]          Social History     Tobacco Use   Smoking Status Never Smoker   Smokeless Tobacco Never Used       family history includes Atrial fibrillation in his mother; Cancer in his father and mother; Diabetes in his maternal grandfather, maternal grandmother, and paternal uncle; Hyperlipidemia in his father; Hypertension in his father and mother; Stroke in his father, maternal grandfather, and another family member.  Current Outpatient Medications on File Prior to Visit   Medication Sig Dispense Refill   • acetaminophen (TYLENOL) 325 MG tablet Take 650 mg by mouth Every 6 (Six) Hours As Needed for Mild Pain .     • atenolol (TENORMIN) 25 MG tablet TAKE ONE TABLET BY MOUTH ONCE NIGHTLY 90 tablet 3   • cholecalciferol (VITAMIN D3) 1000 units tablet Take 1,000 Units by mouth Daily.     • dicyclomine (BENTYL) 20 MG tablet Take 20 mg by mouth As Needed.     • folic acid-vit B6-vit B12 (FOLBEE) 2.5-25-1 MG tablet tablet Take  by mouth Daily.     • HYDROcodone-acetaminophen (NORCO) 5-325 MG per tablet Take 1 tablet by mouth Every 6 (Six) Hours As Needed for Moderate Pain . 12 tablet 0   • lisinopril (PRINIVIL,ZESTRIL) 20 MG tablet TAKE ONE TABLET BY MOUTH DAILY 90 tablet 1   • naproxen (NAPROSYN) 500 MG tablet Take 1 tablet by mouth 2 (Two) Times a Day With Meals. 60 tablet 2   • nortriptyline (PAMELOR) 50 MG capsule Take 50 mg by mouth Every Night.     • ondansetron ODT (ZOFRAN-ODT) 4 MG disintegrating tablet Place 1 tablet on the tongue Every 8 (Eight) Hours As Needed for Vomiting. 12  tablet 0   • traMADol (ULTRAM) 50 MG tablet Take 1 tablet by mouth Every 6 (Six) Hours As Needed for Severe Pain . 12 tablet 0   • vitamin B-12 (CYANOCOBALAMIN) 1000 MCG tablet Take 1,000 mcg by mouth Daily.     • [DISCONTINUED] azithromycin (Zithromax Z-Savage) 250 MG tablet Take 2 tablets the first day, then 1 tablet daily for 4 days. 6 tablet 0   • [DISCONTINUED] nortriptyline (PAMELOR) 10 MG capsule Take 10 mg by mouth 2 (Two) Times a Day.     • [DISCONTINUED] sulfamethoxazole-trimethoprim (BACTRIM DS,SEPTRA DS) 800-160 MG per tablet Take 1 tablet by mouth 2 (Two) Times a Day. 14 tablet 0     Current Facility-Administered Medications on File Prior to Visit   Medication Dose Route Frequency Provider Last Rate Last Admin   • [COMPLETED] ketorolac (TORADOL) injection 30 mg  30 mg Intravenous Once Kal Weber MD   30 mg at 12/10/20 2055   • [COMPLETED] Morphine sulfate (PF) injection 4 mg  4 mg Intravenous Once Kal Weber MD   4 mg at 12/10/20 1934   • [COMPLETED] Morphine sulfate (PF) injection 4 mg  4 mg Intravenous Once Kal Weber MD   4 mg at 12/10/20 2059   • [COMPLETED] ondansetron (ZOFRAN) injection 4 mg  4 mg Intravenous Once Kal Weber MD   4 mg at 12/10/20 1934   • [DISCONTINUED] sodium chloride 0.9 % flush 10 mL  10 mL Intravenous PRN Kal Weber MD         Patient Active Problem List   Diagnosis   • Neck strain, initial encounter   • Abdominal pain, right lower quadrant   • Anxiety disorder   • Autistic disorder   • Carpal tunnel syndrome   • Constipation   • Depression   • Dyslexia   • Elevated rheumatoid factor   • Hypertension   • Near syncope   • IBS (irritable bowel syndrome)   • Low back pain   • Encounter for general adult medical examination without abnormal findings   • Vitamin D deficiency   • Tachycardia   • Vitamin B12 deficiency   • Fatigue   • Acute pain of left knee   • Acute non-recurrent maxillary sinusitis   • Cellulitis of left thigh   • Tick bite       The following portions  of the patient's history were reviewed and updated as appropriate: allergies, current medications, past family history, past medical history, past social history, past surgical history and problem list.    Review of Systems   Constitutional: Negative for chills and fever.   HENT: Negative for congestion, sinus pressure, sore throat and swollen glands.    Eyes: Negative for blurred vision.   Respiratory: Negative for cough, shortness of breath and wheezing.    Cardiovascular: Negative for chest pain and palpitations.   Gastrointestinal: Positive for abdominal pain and nausea.   Endocrine: Negative for polyuria.   Genitourinary: Positive for flank pain. Negative for difficulty urinating.   Skin: Negative for rash.   Neurological: Negative for dizziness, seizures and headache.   Hematological: Negative for adenopathy.   Psychiatric/Behavioral: Negative for depressed mood.       Objective   /78 (BP Location: Right arm, Patient Position: Sitting, Cuff Size: Adult)   Pulse 88   Temp 98 °F (36.7 °C)   Wt 97.5 kg (215 lb)   SpO2 97%   BMI 29.16 kg/m²   Physical Exam  Constitutional:       General: He is not in acute distress.     Appearance: He is well-developed.   HENT:      Head: Normocephalic.   Eyes:      General: Lids are normal.      Conjunctiva/sclera: Conjunctivae normal.   Neck:      Musculoskeletal: Normal range of motion.      Thyroid: No thyroid mass or thyromegaly.      Trachea: Trachea normal.   Cardiovascular:      Rate and Rhythm: Normal rate and regular rhythm.      Heart sounds: Normal heart sounds.   Pulmonary:      Effort: Pulmonary effort is normal.      Breath sounds: Normal breath sounds.   Abdominal:      Palpations: Abdomen is soft.   Lymphadenopathy:      Cervical: No cervical adenopathy.   Skin:     General: Skin is warm and dry.   Neurological:      Mental Status: He is alert and oriented to person, place, and time.   Psychiatric:         Attention and Perception: He is attentive.          Mood and Affect: Mood normal.         Speech: Speech normal.         Behavior: Behavior normal.         Admission on 12/10/2020, Discharged on 12/10/2020   Component Date Value Ref Range Status   • Color, UA 12/10/2020 Yellow  Yellow, Straw Final   • Appearance, UA 12/10/2020 Cloudy* Clear Final    Result checked    • pH, UA 12/10/2020 7.0  5.0 - 8.0 Final   • Specific Gravity, UA 12/10/2020 1.026  1.005 - 1.030 Final   • Glucose, UA 12/10/2020 Negative  Negative Final   • Ketones, UA 12/10/2020 80 mg/dL (3+)* Negative Final   • Bilirubin, UA 12/10/2020 Negative  Negative Final   • Blood, UA 12/10/2020 Small (1+)* Negative Final   • Protein, UA 12/10/2020 Negative  Negative Final   • Leuk Esterase, UA 12/10/2020 Negative  Negative Final   • Nitrite, UA 12/10/2020 Negative  Negative Final   • Urobilinogen, UA 12/10/2020 1.0 E.U./dL  0.2 - 1.0 E.U./dL Final   • RBC, UA 12/10/2020 13-20* None Seen /HPF Final   • WBC, UA 12/10/2020 0-2* None Seen /HPF Final   • Bacteria, UA 12/10/2020 None Seen  None Seen /HPF Final   • Squamous Epithelial Cells, UA 12/10/2020 None Seen  None Seen, 0-2 /HPF Final   • Hyaline Casts, UA 12/10/2020 0-2  None Seen /LPF Final   • Methodology 12/10/2020 Automated Microscopy   Final           Assessment/Plan   Diagnoses and all orders for this visit:    1. Right renal stone (Primary)  Comments:  Add Flomax.  Take Zofran and Toradol as ordered by the ER last night.  Orders:  -     tamsulosin (FLOMAX) 0.4 MG capsule 24 hr capsule; Take 1 capsule by mouth Daily.  Dispense: 10 capsule; Refill: 0    2. Nausea

## 2020-12-14 ENCOUNTER — HOSPITAL ENCOUNTER (OUTPATIENT)
Facility: HOSPITAL | Age: 35
Discharge: HOME | End: 2020-12-15
Attending: INTERNAL MEDICINE | Admitting: UROLOGY

## 2020-12-14 DIAGNOSIS — R10.9 RIGHT FLANK PAIN: Primary | ICD-10-CM

## 2020-12-14 DIAGNOSIS — N20.0 RIGHT NEPHROLITHIASIS: ICD-10-CM

## 2020-12-14 LAB
ALBUMIN SERPL-MCNC: 4.9 G/DL (ref 3.5–5.2)
ALBUMIN/GLOB SERPL: 1.9 G/DL
ALP SERPL-CCNC: 73 U/L (ref 39–117)
ALT SERPL W P-5'-P-CCNC: 38 U/L (ref 1–41)
ANION GAP SERPL CALCULATED.3IONS-SCNC: 14 MMOL/L (ref 5–15)
AST SERPL-CCNC: 27 U/L (ref 1–40)
BACTERIA UR QL AUTO: ABNORMAL /HPF
BASOPHILS # BLD AUTO: 0 10*3/MM3 (ref 0–0.2)
BASOPHILS NFR BLD AUTO: 0.1 % (ref 0–1.5)
BILIRUB SERPL-MCNC: 0.6 MG/DL (ref 0–1.2)
BILIRUB UR QL STRIP: NEGATIVE
BUN SERPL-MCNC: 13 MG/DL (ref 6–20)
BUN/CREAT SERPL: 11.7 (ref 7–25)
CALCIUM SPEC-SCNC: 10.4 MG/DL (ref 8.6–10.5)
CHLORIDE SERPL-SCNC: 98 MMOL/L (ref 98–107)
CLARITY UR: ABNORMAL
CO2 SERPL-SCNC: 28 MMOL/L (ref 22–29)
COLOR UR: ABNORMAL
CREAT SERPL-MCNC: 1.11 MG/DL (ref 0.76–1.27)
DEPRECATED RDW RBC AUTO: 38.9 FL (ref 37–54)
EOSINOPHIL # BLD AUTO: 0 10*3/MM3 (ref 0–0.4)
EOSINOPHIL NFR BLD AUTO: 0 % (ref 0.3–6.2)
ERYTHROCYTE [DISTWIDTH] IN BLOOD BY AUTOMATED COUNT: 12.7 % (ref 12.3–15.4)
GFR SERPL CREATININE-BSD FRML MDRD: 75 ML/MIN/1.73
GLOBULIN UR ELPH-MCNC: 2.6 GM/DL
GLUCOSE SERPL-MCNC: 124 MG/DL (ref 65–99)
GLUCOSE UR STRIP-MCNC: NEGATIVE MG/DL
HCT VFR BLD AUTO: 44.2 % (ref 37.5–51)
HGB BLD-MCNC: 14.8 G/DL (ref 13–17.7)
HGB UR QL STRIP.AUTO: ABNORMAL
HOLD SPECIMEN: NORMAL
HYALINE CASTS UR QL AUTO: ABNORMAL /LPF
KETONES UR QL STRIP: ABNORMAL
LEUKOCYTE ESTERASE UR QL STRIP.AUTO: NEGATIVE
LIPASE SERPL-CCNC: 46 U/L (ref 13–60)
LYMPHOCYTES # BLD AUTO: 0.9 10*3/MM3 (ref 0.7–3.1)
LYMPHOCYTES NFR BLD AUTO: 6.2 % (ref 19.6–45.3)
MCH RBC QN AUTO: 29.6 PG (ref 26.6–33)
MCHC RBC AUTO-ENTMCNC: 33.6 G/DL (ref 31.5–35.7)
MCV RBC AUTO: 88.1 FL (ref 79–97)
MONOCYTES # BLD AUTO: 0.6 10*3/MM3 (ref 0.1–0.9)
MONOCYTES NFR BLD AUTO: 4.2 % (ref 5–12)
NEUTROPHILS NFR BLD AUTO: 12.7 10*3/MM3 (ref 1.7–7)
NEUTROPHILS NFR BLD AUTO: 89.5 % (ref 42.7–76)
NITRITE UR QL STRIP: NEGATIVE
NRBC BLD AUTO-RTO: 0.1 /100 WBC (ref 0–0.2)
PH UR STRIP.AUTO: 8.5 [PH] (ref 5–8)
PLATELET # BLD AUTO: 436 10*3/MM3 (ref 140–450)
PMV BLD AUTO: 7.9 FL (ref 6–12)
POTASSIUM SERPL-SCNC: 4.2 MMOL/L (ref 3.5–5.2)
PROT SERPL-MCNC: 7.5 G/DL (ref 6–8.5)
PROT UR QL STRIP: ABNORMAL
RBC # BLD AUTO: 5.01 10*6/MM3 (ref 4.14–5.8)
RBC # UR: ABNORMAL /HPF
REF LAB TEST METHOD: ABNORMAL
SODIUM SERPL-SCNC: 140 MMOL/L (ref 136–145)
SP GR UR STRIP: 1.02 (ref 1–1.03)
SQUAMOUS #/AREA URNS HPF: ABNORMAL /HPF
TROPONIN T SERPL-MCNC: <0.01 NG/ML (ref 0–0.03)
UROBILINOGEN UR QL STRIP: ABNORMAL
WBC # BLD AUTO: 14.2 10*3/MM3 (ref 3.4–10.8)
WBC UR QL AUTO: ABNORMAL /HPF
WHOLE BLOOD HOLD SPECIMEN: NORMAL

## 2020-12-14 PROCEDURE — G0378 HOSPITAL OBSERVATION PER HR: HCPCS

## 2020-12-14 PROCEDURE — 25010000002 ONDANSETRON PER 1 MG: Performed by: PHYSICIAN ASSISTANT

## 2020-12-14 PROCEDURE — 25010000002 CEFTRIAXONE PER 250 MG: Performed by: PHYSICIAN ASSISTANT

## 2020-12-14 PROCEDURE — 83690 ASSAY OF LIPASE: CPT | Performed by: PHYSICIAN ASSISTANT

## 2020-12-14 PROCEDURE — 87040 BLOOD CULTURE FOR BACTERIA: CPT | Performed by: PHYSICIAN ASSISTANT

## 2020-12-14 PROCEDURE — 99219 PR INITIAL OBSERVATION CARE/DAY 50 MINUTES: CPT | Performed by: NURSE PRACTITIONER

## 2020-12-14 PROCEDURE — 99284 EMERGENCY DEPT VISIT MOD MDM: CPT

## 2020-12-14 PROCEDURE — 93005 ELECTROCARDIOGRAM TRACING: CPT | Performed by: PHYSICIAN ASSISTANT

## 2020-12-14 PROCEDURE — 85025 COMPLETE CBC W/AUTO DIFF WBC: CPT | Performed by: PHYSICIAN ASSISTANT

## 2020-12-14 PROCEDURE — 96375 TX/PRO/DX INJ NEW DRUG ADDON: CPT

## 2020-12-14 PROCEDURE — 80053 COMPREHEN METABOLIC PANEL: CPT | Performed by: PHYSICIAN ASSISTANT

## 2020-12-14 PROCEDURE — 81001 URINALYSIS AUTO W/SCOPE: CPT | Performed by: PHYSICIAN ASSISTANT

## 2020-12-14 PROCEDURE — 84484 ASSAY OF TROPONIN QUANT: CPT | Performed by: PHYSICIAN ASSISTANT

## 2020-12-14 PROCEDURE — 96374 THER/PROPH/DIAG INJ IV PUSH: CPT

## 2020-12-14 PROCEDURE — 25010000002 KETOROLAC TROMETHAMINE PER 15 MG: Performed by: PHYSICIAN ASSISTANT

## 2020-12-14 RX ORDER — TRAMADOL HYDROCHLORIDE 50 MG/1
50 TABLET ORAL EVERY 6 HOURS PRN
Status: DISCONTINUED | OUTPATIENT
Start: 2020-12-14 | End: 2020-12-15

## 2020-12-14 RX ORDER — TAMSULOSIN HYDROCHLORIDE 0.4 MG/1
0.4 CAPSULE ORAL DAILY
Status: DISCONTINUED | OUTPATIENT
Start: 2020-12-15 | End: 2020-12-15 | Stop reason: HOSPADM

## 2020-12-14 RX ORDER — KETOROLAC TROMETHAMINE 15 MG/ML
15 INJECTION, SOLUTION INTRAMUSCULAR; INTRAVENOUS ONCE
Status: COMPLETED | OUTPATIENT
Start: 2020-12-14 | End: 2020-12-14

## 2020-12-14 RX ORDER — SODIUM CHLORIDE 0.9 % (FLUSH) 0.9 %
10 SYRINGE (ML) INJECTION EVERY 12 HOURS SCHEDULED
Status: DISCONTINUED | OUTPATIENT
Start: 2020-12-14 | End: 2020-12-15 | Stop reason: HOSPADM

## 2020-12-14 RX ORDER — LISINOPRIL 20 MG/1
20 TABLET ORAL DAILY
Status: DISCONTINUED | OUTPATIENT
Start: 2020-12-15 | End: 2020-12-15 | Stop reason: HOSPADM

## 2020-12-14 RX ORDER — FAMOTIDINE 10 MG/ML
20 INJECTION, SOLUTION INTRAVENOUS ONCE
Status: COMPLETED | OUTPATIENT
Start: 2020-12-14 | End: 2020-12-14

## 2020-12-14 RX ORDER — ACETAMINOPHEN 650 MG/1
650 SUPPOSITORY RECTAL EVERY 4 HOURS PRN
Status: DISCONTINUED | OUTPATIENT
Start: 2020-12-14 | End: 2020-12-15 | Stop reason: HOSPADM

## 2020-12-14 RX ORDER — ONDANSETRON 2 MG/ML
4 INJECTION INTRAMUSCULAR; INTRAVENOUS EVERY 6 HOURS PRN
Status: DISCONTINUED | OUTPATIENT
Start: 2020-12-14 | End: 2020-12-15 | Stop reason: HOSPADM

## 2020-12-14 RX ORDER — ONDANSETRON 4 MG/1
4 TABLET, FILM COATED ORAL EVERY 6 HOURS PRN
Status: DISCONTINUED | OUTPATIENT
Start: 2020-12-14 | End: 2020-12-15 | Stop reason: HOSPADM

## 2020-12-14 RX ORDER — MELATONIN
1000 DAILY
Status: DISCONTINUED | OUTPATIENT
Start: 2020-12-15 | End: 2020-12-15 | Stop reason: HOSPADM

## 2020-12-14 RX ORDER — DICYCLOMINE HYDROCHLORIDE 10 MG/1
20 CAPSULE ORAL 3 TIMES DAILY
Status: DISCONTINUED | OUTPATIENT
Start: 2020-12-14 | End: 2020-12-15 | Stop reason: HOSPADM

## 2020-12-14 RX ORDER — HYDROCODONE BITARTRATE AND ACETAMINOPHEN 10; 325 MG/1; MG/1
1 TABLET ORAL EVERY 4 HOURS PRN
Status: DISCONTINUED | OUTPATIENT
Start: 2020-12-14 | End: 2020-12-15 | Stop reason: HOSPADM

## 2020-12-14 RX ORDER — SODIUM CHLORIDE 0.9 % (FLUSH) 0.9 %
10 SYRINGE (ML) INJECTION AS NEEDED
Status: DISCONTINUED | OUTPATIENT
Start: 2020-12-14 | End: 2020-12-15 | Stop reason: HOSPADM

## 2020-12-14 RX ORDER — SODIUM CHLORIDE 9 MG/ML
100 INJECTION, SOLUTION INTRAVENOUS CONTINUOUS
Status: DISCONTINUED | OUTPATIENT
Start: 2020-12-14 | End: 2020-12-15 | Stop reason: HOSPADM

## 2020-12-14 RX ORDER — ACETAMINOPHEN 160 MG/5ML
650 SOLUTION ORAL EVERY 4 HOURS PRN
Status: DISCONTINUED | OUTPATIENT
Start: 2020-12-14 | End: 2020-12-15 | Stop reason: HOSPADM

## 2020-12-14 RX ORDER — HYDROCODONE BITARTRATE AND ACETAMINOPHEN 5; 325 MG/1; MG/1
1 TABLET ORAL EVERY 4 HOURS PRN
Status: DISCONTINUED | OUTPATIENT
Start: 2020-12-14 | End: 2020-12-15 | Stop reason: HOSPADM

## 2020-12-14 RX ORDER — ONDANSETRON 2 MG/ML
4 INJECTION INTRAMUSCULAR; INTRAVENOUS ONCE
Status: COMPLETED | OUTPATIENT
Start: 2020-12-14 | End: 2020-12-14

## 2020-12-14 RX ORDER — LANOLIN ALCOHOL/MO/W.PET/CERES
1000 CREAM (GRAM) TOPICAL DAILY
Status: DISCONTINUED | OUTPATIENT
Start: 2020-12-15 | End: 2020-12-15 | Stop reason: HOSPADM

## 2020-12-14 RX ORDER — ACETAMINOPHEN 325 MG/1
650 TABLET ORAL EVERY 4 HOURS PRN
Status: DISCONTINUED | OUTPATIENT
Start: 2020-12-14 | End: 2020-12-15 | Stop reason: HOSPADM

## 2020-12-14 RX ORDER — ATENOLOL 25 MG/1
25 TABLET ORAL NIGHTLY
Status: DISCONTINUED | OUTPATIENT
Start: 2020-12-15 | End: 2020-12-15 | Stop reason: HOSPADM

## 2020-12-14 RX ORDER — NORTRIPTYLINE HYDROCHLORIDE 25 MG/1
100 CAPSULE ORAL NIGHTLY
Status: DISCONTINUED | OUTPATIENT
Start: 2020-12-15 | End: 2020-12-15 | Stop reason: HOSPADM

## 2020-12-14 RX ADMIN — KETOROLAC TROMETHAMINE 15 MG: 15 INJECTION, SOLUTION INTRAMUSCULAR; INTRAVENOUS at 20:30

## 2020-12-14 RX ADMIN — SODIUM CHLORIDE 1000 ML: 9 INJECTION, SOLUTION INTRAVENOUS at 20:31

## 2020-12-14 RX ADMIN — ONDANSETRON 4 MG: 2 INJECTION, SOLUTION INTRAMUSCULAR; INTRAVENOUS at 20:22

## 2020-12-14 RX ADMIN — CEFTRIAXONE SODIUM 1 G: 10 INJECTION, POWDER, FOR SOLUTION INTRAVENOUS at 21:36

## 2020-12-14 RX ADMIN — SODIUM CHLORIDE 100 ML/HR: 9 INJECTION, SOLUTION INTRAVENOUS at 22:32

## 2020-12-14 RX ADMIN — FAMOTIDINE 20 MG: 10 INJECTION INTRAVENOUS at 19:50

## 2020-12-14 RX ADMIN — SODIUM CHLORIDE 500 ML: 9 INJECTION, SOLUTION INTRAVENOUS at 19:17

## 2020-12-15 ENCOUNTER — APPOINTMENT (OUTPATIENT)
Dept: CT IMAGING | Facility: HOSPITAL | Age: 35
End: 2020-12-15

## 2020-12-15 ENCOUNTER — ANESTHESIA (OUTPATIENT)
Dept: PERIOP | Facility: HOSPITAL | Age: 35
End: 2020-12-15

## 2020-12-15 ENCOUNTER — ANESTHESIA EVENT (OUTPATIENT)
Dept: PERIOP | Facility: HOSPITAL | Age: 35
End: 2020-12-15

## 2020-12-15 ENCOUNTER — READMISSION MANAGEMENT (OUTPATIENT)
Dept: CALL CENTER | Facility: HOSPITAL | Age: 35
End: 2020-12-15

## 2020-12-15 VITALS
OXYGEN SATURATION: 97 % | SYSTOLIC BLOOD PRESSURE: 122 MMHG | TEMPERATURE: 97.7 F | RESPIRATION RATE: 17 BRPM | HEART RATE: 88 BPM | HEIGHT: 72 IN | DIASTOLIC BLOOD PRESSURE: 79 MMHG | BODY MASS INDEX: 28.31 KG/M2 | WEIGHT: 209 LBS

## 2020-12-15 PROBLEM — R10.9 RIGHT FLANK PAIN: Status: RESOLVED | Noted: 2020-12-14 | Resolved: 2020-12-15

## 2020-12-15 PROBLEM — N20.0 RIGHT NEPHROLITHIASIS: Status: RESOLVED | Noted: 2020-12-14 | Resolved: 2020-12-15

## 2020-12-15 PROBLEM — N20.0 RIGHT NEPHROLITHIASIS: Status: ACTIVE | Noted: 2020-12-14

## 2020-12-15 LAB
ALBUMIN SERPL-MCNC: 4.1 G/DL (ref 3.5–5.2)
ALBUMIN/GLOB SERPL: 2.1 G/DL
ALP SERPL-CCNC: 61 U/L (ref 39–117)
ALT SERPL W P-5'-P-CCNC: 28 U/L (ref 1–41)
ANION GAP SERPL CALCULATED.3IONS-SCNC: 8 MMOL/L (ref 5–15)
APTT PPP: 26.2 SECONDS (ref 24–31)
AST SERPL-CCNC: 19 U/L (ref 1–40)
BASOPHILS # BLD AUTO: 0 10*3/MM3 (ref 0–0.2)
BASOPHILS NFR BLD AUTO: 0.1 % (ref 0–1.5)
BILIRUB SERPL-MCNC: 0.4 MG/DL (ref 0–1.2)
BUN SERPL-MCNC: 13 MG/DL (ref 6–20)
BUN/CREAT SERPL: 10.6 (ref 7–25)
CALCIUM SPEC-SCNC: 8.9 MG/DL (ref 8.6–10.5)
CHLORIDE SERPL-SCNC: 102 MMOL/L (ref 98–107)
CO2 SERPL-SCNC: 27 MMOL/L (ref 22–29)
CREAT SERPL-MCNC: 1.23 MG/DL (ref 0.76–1.27)
DEPRECATED RDW RBC AUTO: 38.1 FL (ref 37–54)
EOSINOPHIL # BLD AUTO: 0 10*3/MM3 (ref 0–0.4)
EOSINOPHIL NFR BLD AUTO: 0.1 % (ref 0.3–6.2)
ERYTHROCYTE [DISTWIDTH] IN BLOOD BY AUTOMATED COUNT: 12.4 % (ref 12.3–15.4)
GFR SERPL CREATININE-BSD FRML MDRD: 66 ML/MIN/1.73
GLOBULIN UR ELPH-MCNC: 2 GM/DL
GLUCOSE SERPL-MCNC: 136 MG/DL (ref 65–99)
HCT VFR BLD AUTO: 37.4 % (ref 37.5–51)
HGB BLD-MCNC: 12.7 G/DL (ref 13–17.7)
INR PPP: 1.12 (ref 0.93–1.1)
LYMPHOCYTES # BLD AUTO: 1 10*3/MM3 (ref 0.7–3.1)
LYMPHOCYTES NFR BLD AUTO: 7.8 % (ref 19.6–45.3)
MCH RBC QN AUTO: 29.8 PG (ref 26.6–33)
MCHC RBC AUTO-ENTMCNC: 34 G/DL (ref 31.5–35.7)
MCV RBC AUTO: 87.7 FL (ref 79–97)
MONOCYTES # BLD AUTO: 1.1 10*3/MM3 (ref 0.1–0.9)
MONOCYTES NFR BLD AUTO: 8.9 % (ref 5–12)
NEUTROPHILS NFR BLD AUTO: 10.1 10*3/MM3 (ref 1.7–7)
NEUTROPHILS NFR BLD AUTO: 83.1 % (ref 42.7–76)
NRBC BLD AUTO-RTO: 0 /100 WBC (ref 0–0.2)
PLATELET # BLD AUTO: 388 10*3/MM3 (ref 140–450)
PMV BLD AUTO: 7.1 FL (ref 6–12)
POTASSIUM SERPL-SCNC: 3.9 MMOL/L (ref 3.5–5.2)
PROT SERPL-MCNC: 6.1 G/DL (ref 6–8.5)
PROTHROMBIN TIME: 12.3 SECONDS (ref 9.6–11.7)
RBC # BLD AUTO: 4.26 10*6/MM3 (ref 4.14–5.8)
SARS-COV-2 RNA PNL SPEC NAA+PROBE: NOT DETECTED
SODIUM SERPL-SCNC: 137 MMOL/L (ref 136–145)
WBC # BLD AUTO: 12.1 10*3/MM3 (ref 3.4–10.8)

## 2020-12-15 PROCEDURE — G0378 HOSPITAL OBSERVATION PER HR: HCPCS

## 2020-12-15 PROCEDURE — 74176 CT ABD & PELVIS W/O CONTRAST: CPT

## 2020-12-15 PROCEDURE — 25010000002 ONDANSETRON PER 1 MG: Performed by: NURSE PRACTITIONER

## 2020-12-15 PROCEDURE — 85730 THROMBOPLASTIN TIME PARTIAL: CPT | Performed by: INTERNAL MEDICINE

## 2020-12-15 PROCEDURE — C1758 CATHETER, URETERAL: HCPCS | Performed by: UROLOGY

## 2020-12-15 PROCEDURE — 25010000002 FENTANYL CITRATE (PF) 100 MCG/2ML SOLUTION: Performed by: ANESTHESIOLOGY

## 2020-12-15 PROCEDURE — 85025 COMPLETE CBC W/AUTO DIFF WBC: CPT | Performed by: INTERNAL MEDICINE

## 2020-12-15 PROCEDURE — C1769 GUIDE WIRE: HCPCS | Performed by: UROLOGY

## 2020-12-15 PROCEDURE — C2617 STENT, NON-COR, TEM W/O DEL: HCPCS | Performed by: UROLOGY

## 2020-12-15 PROCEDURE — 25010000002 PROPOFOL 10 MG/ML EMULSION: Performed by: ANESTHESIOLOGY

## 2020-12-15 PROCEDURE — 85610 PROTHROMBIN TIME: CPT | Performed by: INTERNAL MEDICINE

## 2020-12-15 PROCEDURE — U0003 INFECTIOUS AGENT DETECTION BY NUCLEIC ACID (DNA OR RNA); SEVERE ACUTE RESPIRATORY SYNDROME CORONAVIRUS 2 (SARS-COV-2) (CORONAVIRUS DISEASE [COVID-19]), AMPLIFIED PROBE TECHNIQUE, MAKING USE OF HIGH THROUGHPUT TECHNOLOGIES AS DESCRIBED BY CMS-2020-01-R: HCPCS | Performed by: INTERNAL MEDICINE

## 2020-12-15 PROCEDURE — 25010000002 ONDANSETRON PER 1 MG: Performed by: UROLOGY

## 2020-12-15 PROCEDURE — 82365 CALCULUS SPECTROSCOPY: CPT | Performed by: UROLOGY

## 2020-12-15 PROCEDURE — 96376 TX/PRO/DX INJ SAME DRUG ADON: CPT

## 2020-12-15 PROCEDURE — 25010000002 MIDAZOLAM PER 1 MG: Performed by: ANESTHESIOLOGY

## 2020-12-15 PROCEDURE — 99217 PR OBSERVATION CARE DISCHARGE MANAGEMENT: CPT | Performed by: HOSPITALIST

## 2020-12-15 PROCEDURE — 80053 COMPREHEN METABOLIC PANEL: CPT | Performed by: INTERNAL MEDICINE

## 2020-12-15 DEVICE — URETERAL STENT
Type: IMPLANTABLE DEVICE | Site: URETER | Status: FUNCTIONAL
Brand: PERCUFLEX™ PLUS

## 2020-12-15 RX ORDER — CIPROFLOXACIN 500 MG/1
500 TABLET, FILM COATED ORAL 2 TIMES DAILY
Qty: 14 TABLET | Refills: 0 | Status: SHIPPED | OUTPATIENT
Start: 2020-12-15 | End: 2020-12-29

## 2020-12-15 RX ORDER — FENTANYL CITRATE 50 UG/ML
INJECTION, SOLUTION INTRAMUSCULAR; INTRAVENOUS AS NEEDED
Status: DISCONTINUED | OUTPATIENT
Start: 2020-12-15 | End: 2020-12-15 | Stop reason: SURG

## 2020-12-15 RX ORDER — SODIUM CHLORIDE, SODIUM LACTATE, POTASSIUM CHLORIDE, CALCIUM CHLORIDE 600; 310; 30; 20 MG/100ML; MG/100ML; MG/100ML; MG/100ML
INJECTION, SOLUTION INTRAVENOUS CONTINUOUS PRN
Status: DISCONTINUED | OUTPATIENT
Start: 2020-12-15 | End: 2020-12-15 | Stop reason: SURG

## 2020-12-15 RX ORDER — HYDROCODONE BITARTRATE AND ACETAMINOPHEN 7.5; 325 MG/1; MG/1
1 TABLET ORAL EVERY 6 HOURS PRN
Qty: 20 TABLET | Refills: 0 | Status: SHIPPED | OUTPATIENT
Start: 2020-12-15 | End: 2020-12-29

## 2020-12-15 RX ORDER — FENTANYL CITRATE 50 UG/ML
50 INJECTION, SOLUTION INTRAMUSCULAR; INTRAVENOUS
Status: DISCONTINUED | OUTPATIENT
Start: 2020-12-15 | End: 2020-12-15 | Stop reason: HOSPADM

## 2020-12-15 RX ORDER — MIDAZOLAM HYDROCHLORIDE 1 MG/ML
INJECTION INTRAMUSCULAR; INTRAVENOUS AS NEEDED
Status: DISCONTINUED | OUTPATIENT
Start: 2020-12-15 | End: 2020-12-15 | Stop reason: SURG

## 2020-12-15 RX ORDER — ONDANSETRON 2 MG/ML
4 INJECTION INTRAMUSCULAR; INTRAVENOUS ONCE AS NEEDED
Status: DISCONTINUED | OUTPATIENT
Start: 2020-12-15 | End: 2020-12-15 | Stop reason: HOSPADM

## 2020-12-15 RX ORDER — MAGNESIUM HYDROXIDE 1200 MG/15ML
LIQUID ORAL AS NEEDED
Status: DISCONTINUED | OUTPATIENT
Start: 2020-12-15 | End: 2020-12-15 | Stop reason: HOSPADM

## 2020-12-15 RX ORDER — PROPOFOL 10 MG/ML
VIAL (ML) INTRAVENOUS AS NEEDED
Status: DISCONTINUED | OUTPATIENT
Start: 2020-12-15 | End: 2020-12-15 | Stop reason: SURG

## 2020-12-15 RX ORDER — TAMSULOSIN HYDROCHLORIDE 0.4 MG/1
0.8 CAPSULE ORAL ONCE
Status: COMPLETED | OUTPATIENT
Start: 2020-12-15 | End: 2020-12-15

## 2020-12-15 RX ORDER — HYDROMORPHONE HCL 110MG/55ML
0.5 PATIENT CONTROLLED ANALGESIA SYRINGE INTRAVENOUS
Status: DISCONTINUED | OUTPATIENT
Start: 2020-12-15 | End: 2020-12-15 | Stop reason: HOSPADM

## 2020-12-15 RX ADMIN — HYDROCODONE BITARTRATE AND ACETAMINOPHEN 1 TABLET: 10; 325 TABLET ORAL at 01:22

## 2020-12-15 RX ADMIN — FENTANYL CITRATE 50 MCG: 50 INJECTION, SOLUTION INTRAMUSCULAR; INTRAVENOUS at 10:24

## 2020-12-15 RX ADMIN — ONDANSETRON 4 MG: 2 INJECTION INTRAMUSCULAR; INTRAVENOUS at 16:05

## 2020-12-15 RX ADMIN — FENTANYL CITRATE 50 MCG: 50 INJECTION, SOLUTION INTRAMUSCULAR; INTRAVENOUS at 10:27

## 2020-12-15 RX ADMIN — TAMSULOSIN HYDROCHLORIDE 0.8 MG: 0.4 CAPSULE ORAL at 01:22

## 2020-12-15 RX ADMIN — MIDAZOLAM 2 MG: 1 INJECTION INTRAMUSCULAR; INTRAVENOUS at 10:12

## 2020-12-15 RX ADMIN — Medication 10 ML: at 01:08

## 2020-12-15 RX ADMIN — FENTANYL CITRATE 100 MCG: 50 INJECTION, SOLUTION INTRAMUSCULAR; INTRAVENOUS at 10:12

## 2020-12-15 RX ADMIN — ONDANSETRON 4 MG: 2 INJECTION INTRAMUSCULAR; INTRAVENOUS at 01:08

## 2020-12-15 RX ADMIN — SODIUM CHLORIDE, SODIUM LACTATE, POTASSIUM CHLORIDE, AND CALCIUM CHLORIDE: .6; .31; .03; .02 INJECTION, SOLUTION INTRAVENOUS at 10:11

## 2020-12-15 RX ADMIN — NORTRIPTYLINE HYDROCHLORIDE 100 MG: 25 CAPSULE ORAL at 01:22

## 2020-12-15 RX ADMIN — PROPOFOL 200 MG: 10 INJECTION, EMULSION INTRAVENOUS at 10:15

## 2020-12-15 NOTE — OUTREACH NOTE
Prep Survey      Responses   [facility] patient discharged from?  [facility]   Is LACE score < 7 ?  Yes   Eligibility  [facility]   Date of Admission  12/14/20   Date of Discharge  12/15/20   Discharge Disposition  Home or Self Care   Discharge diagnosis  acute nephrolithiasis and mild right-sided hydroureteric nephrosis,      underwent cystoscopy and right ureteroscopic stone extraction with placement of a right ureteral stent   Does the patient have one of the following disease processes/diagnoses(primary or secondary)?  Other   Does the patient have Home health ordered?  No   Is there a DME ordered?  No   Prep survey completed?  Yes          Louisa Mcconnell RN

## 2020-12-15 NOTE — ANESTHESIA PROCEDURE NOTES
Airway  Urgency: elective    Date/Time: 12/15/2020 10:16 AM  Airway not difficult    General Information and Staff    Patient location during procedure: OR  Anesthesiologist: Jacob Viera MD    Indications and Patient Condition  Indications for airway management: airway protection    Preoxygenated: yes  Mask difficulty assessment: 0 - not attempted    Final Airway Details  Final airway type: supraglottic airway      Successful airway: LMA  Size 4    Number of attempts at approach: 1  Assessment: lips, teeth, and gum same as pre-op and atraumatic intubation

## 2020-12-15 NOTE — PLAN OF CARE
Goal Outcome Evaluation:  Plan of Care Reviewed With: patient  Progress: no change     Patient is stable. Patient has ambulated, urinated and eaten since surgery this AM. Patient has no complaints of pain or nausea. Patient to discharge home this evening.

## 2020-12-15 NOTE — ANESTHESIA PREPROCEDURE EVALUATION
Anesthesia Evaluation     Patient summary reviewed and Nursing notes reviewed   NPO Solid Status: > 6 hours  NPO Liquid Status: > 6 hours           Airway   Mallampati: II  TM distance: >3 FB  Neck ROM: full  No difficulty expected  Dental - normal exam     Pulmonary - negative pulmonary ROS and normal exam    breath sounds clear to auscultation  Cardiovascular - normal exam    ECG reviewed  Rhythm: regular  Rate: normal    (+) hypertension,       Neuro/Psych  (+) numbness, psychiatric history,     GI/Hepatic/Renal/Endo    (+)   renal disease,     Musculoskeletal (-) negative ROS    Abdominal  - normal exam    Abdomen: soft.  Bowel sounds: normal.   Substance History - negative use     OB/GYN negative ob/gyn ROS         Other - negative ROS                       Anesthesia Plan    ASA 2 - emergent     general     intravenous induction     Anesthetic plan, all risks, benefits, and alternatives have been provided, discussed and informed consent has been obtained with: patient.  Use of blood products discussed with patient .

## 2020-12-15 NOTE — CONSULTS
Urology Consult Note    Patient:Davian Arevalo :1985  Room:[facility]  Admit Date2020  Age:35 y.o.     SEX:male     DOS:12/15/2020     MR:[id number]     Visit:[id number]       Attending: Mindy Hidalgo MD  Referring Provider: Dr Hidalgo  Reason for Consultation: Right ureteral calculus    Patient Care Team:  Merlene Zamorano MD as PCP - General (Family Medicine)  Juliane Jason MD as Consulting Physician (Rheumatology)  Merlene Zamorano MD    Chief complaint right flank pain    Subjective .     History of present illness: 35-year-old male who presents with intermittent right flank pain since 2020.  Patient states the pain is up to 10 out of 10 as it worse.  It does get better with IV narcotics.  He does have associated nausea and vomiting.  He is actually currently having a lot of neck pain also which he actually finds worse than the flank pain.    Patient for started having right flank pain on 2020.  He came to the emergency room on the  and a CT scan at that time showed a 3 mm stone in the right ureter at the level of the ureteropelvic junction.  Patient was sent home.  He was tolerating the pain fairly well until it became quite a bit worse in December 10.  He represented to the emergency room.  A repeat CT scan showed that the 3 mm stone was now in the distal right ureter near the ureterovesical junction.  There was mild hydronephrosis.  Patient again was discharged home as his pain was controlled but then represented on  and was admitted for right flank pain and neck pain.  Patient again continues to have some nausea and vomiting.    Review of Systems  10 point review of systems were reviewed and are negative except for:  Constitution:  positive for See HPI    History  Past Medical History:   Diagnosis Date   • Abdominal pain, chronic, right lower quadrant 2015   • Anxiety    • Autism 2014    mild ( Abstracted from [employer].)   •  Carpal tunnel syndrome 02/22/2018   • Constipation 09/21/2018   • Depression    • Dyslexia 12/26/2017   • Hypertension    • IBS (irritable bowel syndrome) 09/21/2018   • IBS (irritable bowel syndrome)    • Knee pain, left 07/25/2017   • Lower back pain 01/30/2015   • Right cervical radiculopathy 01/07/2019   • Shoulder pain, right 02/14/2017   • Tinnitus    • Vitamin D deficiency 12/26/2017     Past Surgical History:   Procedure Laterality Date   • ADENOIDECTOMY     • COLONOSCOPY     • TONSILLECTOMY  1993    Tonsillectomy and Adenoids-   • TONSILLECTOMY AND ADENOIDECTOMY       Social History     Socioeconomic History   • Marital status: Single     Spouse name: Not on file   • Number of children: Not on file   • Years of education: Not on file   • Highest education level: Not on file   Tobacco Use   • Smoking status: Never Smoker   • Smokeless tobacco: Never Used   Substance and Sexual Activity   • Alcohol use: Never     Frequency: Never   • Drug use: No   Social History Narrative    [employer], [employer]          Family History   Problem Relation Age of Onset   • Cancer Mother    • Cancer Father    • Hypertension Father    • Stroke Father    • Hyperlipidemia Father    • Hypertension Mother    • Atrial fibrillation Mother    • Diabetes Paternal Uncle    • Diabetes Maternal Grandmother    • Stroke Maternal Grandfather    • Diabetes Maternal Grandfather    • Stroke Other      Allergy  Allergies   Allergen Reactions   • Ceclor [Cefaclor] Hives   • Amoxicillin Hives   • Cephalexin Hives     Tolerated CTX 12/2020 admission   • Cloral Hives     Prior to Admission medications    Medication Sig Start Date End Date Taking? Authorizing Provider   atenolol (TENORMIN) 25 MG tablet TAKE ONE TABLET BY MOUTH ONCE NIGHTLY 2/19/20  Yes Merlene Zamorano MD   cholecalciferol (VITAMIN D3) 1000 units tablet Take 1,000 Units by mouth Daily. 12/26/17  Yes Merlene Zamorano MD   dicyclomine (BENTYL) 20 MG tablet Take 20 mg by mouth 3  (Three) Times a Day. 14  Yes Merlene Zamorano MD   folic acid-vit B6-vit B12 (FOLBEE) 2.5-25-1 MG tablet tablet Take  by mouth Daily.   Yes Isabella Merrill MD   lisinopril (PRINIVIL,ZESTRIL) 20 MG tablet TAKE ONE TABLET BY MOUTH DAILY 20  Yes Merlene Zamorano MD   nortriptyline (PAMELOR) 50 MG capsule Take 100 mg by mouth Every Night. 14  Yes Merlene Zamorano MD   ondansetron ODT (ZOFRAN-ODT) 4 MG disintegrating tablet Place 1 tablet on the tongue Every 8 (Eight) Hours As Needed for Vomiting. 12/10/20  Yes Kal Weber MD   tamsulosin (FLOMAX) 0.4 MG capsule 24 hr capsule Take 1 capsule by mouth Daily. 20  Yes Merlene Zamorano MD   traMADol (ULTRAM) 50 MG tablet Take 1 tablet by mouth Every 6 (Six) Hours As Needed for Severe Pain . 12/10/20  Yes Kal Weber MD   vitamin B-12 (CYANOCOBALAMIN) 1000 MCG tablet Take 1,000 mcg by mouth Daily. 16  Yes Merlene Zamorano MD         Objective     tMax 24 hours:  Temp (24hrs), Av °F (36.7 °C), Min:97.4 °F (36.3 °C), Max:98.4 °F (36.9 °C)    Vital Sign Ranges:  Temp:  [97.4 °F (36.3 °C)-98.4 °F (36.9 °C)] 98.2 °F (36.8 °C)  Heart Rate:  [] 104  Resp:  [16-20] 20  BP: (120-138)/(55-77) 126/68  Intake and Output Last 3 Shifts:  No intake/output data recorded.      Physical Exam:   General Appearance alert, appears stated age and cooperative  Head normocephalic, without obvious abnormality and atraumatic  Eyes lids and lashes normal, conjunctivae and sclerae normal, no icterus, no pallor and corneas clear  Lungs respirations regular, respirations even and respirations unlabored  Heart regular rhythm & normal rate  Abdomen soft non-tender, no guarding and no rebound tenderness  Extremities moves extremities well, no edema, no cyanosis and no redness  Skin no bleeding, bruising or rash  Neurologic Mental Status orientated to person, place, time and situation    Results Review:     Lab Results (last 24 hours)     Procedure  Component Value Units Date/Time    COVID-19,CEPHEID,COR/NOAM/PAD IN-HOUSE(OR EMERGENT/ADD-ON),NP SWAB IN TRANSPORT MEDIA 3-4 HR TAT - Swab, Nasopharynx [[id number]]  (Normal) Collected: 12/15/20 0417    Specimen: Swab from Nasopharynx Updated: 12/15/20 0628     COVID19 Not Detected    Narrative:      Fact sheet for providers: https://www.fda.gov/media/338931/download     Fact sheet for patients: https://www.fda.gov/media/250827/download    Comprehensive Metabolic Panel [[id number]]  (Abnormal) Collected: 12/15/20 0304    Specimen: Blood Updated: 12/15/20 0346     Glucose 136 mg/dL      BUN 13 mg/dL      Creatinine 1.23 mg/dL      Sodium 137 mmol/L      Potassium 3.9 mmol/L      Chloride 102 mmol/L      CO2 27.0 mmol/L      Calcium 8.9 mg/dL      Total Protein 6.1 g/dL      Albumin 4.10 g/dL      ALT (SGPT) 28 U/L      AST (SGOT) 19 U/L      Alkaline Phosphatase 61 U/L      Total Bilirubin 0.4 mg/dL      eGFR Non African Amer 66 mL/min/1.73      Globulin 2.0 gm/dL      A/G Ratio 2.1 g/dL      BUN/Creatinine Ratio 10.6     Anion Gap 8.0 mmol/L     Narrative:      GFR Normal >60  Chronic Kidney Disease <60  Kidney Failure <15      aPTT [[id number]]  (Normal) Collected: 12/15/20 0304    Specimen: Blood Updated: 12/15/20 0327     PTT 26.2 seconds     Protime-INR [[id number]]  (Abnormal) Collected: 12/15/20 0304    Specimen: Blood Updated: 12/15/20 0327     Protime 12.3 Seconds      INR 1.12    CBC & Differential [[id number]]  (Abnormal) Collected: 12/15/20 0304    Specimen: Blood Updated: 12/15/20 0318    Narrative:      The following orders were created for panel order CBC & Differential.  Procedure                               Abnormality         Status                     ---------                               -----------         ------                     CBC Auto Differential[[id number]]        Abnormal            Final result                 Please view results for these tests on the individual orders.    CBC Auto  Differential [[id number]]  (Abnormal) Collected: 12/15/20 0304    Specimen: Blood Updated: 12/15/20 0318     WBC 12.10 10*3/mm3      RBC 4.26 10*6/mm3      Hemoglobin 12.7 g/dL      Comment: Result checked         Hematocrit 37.4 %      MCV 87.7 fL      MCH 29.8 pg      MCHC 34.0 g/dL      RDW 12.4 %      RDW-SD 38.1 fl      MPV 7.1 fL      Platelets 388 10*3/mm3      Neutrophil % 83.1 %      Lymphocyte % 7.8 %      Monocyte % 8.9 %      Eosinophil % 0.1 %      Basophil % 0.1 %      Neutrophils, Absolute 10.10 10*3/mm3      Lymphocytes, Absolute 1.00 10*3/mm3      Monocytes, Absolute 1.10 10*3/mm3      Eosinophils, Absolute 0.00 10*3/mm3      Basophils, Absolute 0.00 10*3/mm3      nRBC 0.0 /100 WBC     Comprehensive Metabolic Panel [[id number]]  (Abnormal) Collected: 12/14/20 1926    Specimen: Blood Updated: 12/14/20 2101     Glucose 124 mg/dL      BUN 13 mg/dL      Creatinine 1.11 mg/dL      Sodium 140 mmol/L      Potassium 4.2 mmol/L      Comment: Slight hemolysis detected by analyzer. Results may be affected.        Chloride 98 mmol/L      CO2 28.0 mmol/L      Calcium 10.4 mg/dL      Total Protein 7.5 g/dL      Albumin 4.90 g/dL      ALT (SGPT) 38 U/L      AST (SGOT) 27 U/L      Comment: Slight hemolysis detected by analyzer. Results may be affected.        Alkaline Phosphatase 73 U/L      Total Bilirubin 0.6 mg/dL      eGFR Non African Amer 75 mL/min/1.73      Globulin 2.6 gm/dL      A/G Ratio 1.9 g/dL      BUN/Creatinine Ratio 11.7     Anion Gap 14.0 mmol/L     Narrative:      GFR Normal >60  Chronic Kidney Disease <60  Kidney Failure <15      Troponin [[id number]]  (Normal) Collected: 12/14/20 1926    Specimen: Blood Updated: 12/14/20 2101     Troponin T <0.010 ng/mL     Narrative:      Troponin T Reference Range:  <= 0.03 ng/mL-   Negative for AMI  >0.03 ng/mL-     Abnormal for myocardial necrosis.  Clinicians would have to utilize clinical acumen, EKG, Troponin and serial changes to determine if it is an Acute  Myocardial Infarction or myocardial injury due to an underlying chronic condition.       Results may be falsely decreased if patient taking Biotin.      Extra Tubes [[id number]] Collected: 12/14/20 1926    Specimen: Blood, Venous Line Updated: 12/14/20 2030    Narrative:      The following orders were created for panel order Extra Tubes.  Procedure                               Abnormality         Status                     ---------                               -----------         ------                     Light Blue Top[[id number]]                                   Final result               Gold Top - SST[[id number]]                                   Final result                 Please view results for these tests on the individual orders.    Light Blue Top [[id number]] Collected: 12/14/20 1926    Specimen: Blood Updated: 12/14/20 2030     Extra Tube hold for add-on     Comment: Auto resulted       Gold Top - SST [[id number]] Collected: 12/14/20 1926    Specimen: Blood Updated: 12/14/20 2030     Extra Tube Hold for add-ons.     Comment: Auto resulted.       Urinalysis, Microscopic Only - Urine, Clean Catch [[id number]]  (Abnormal) Collected: 12/14/20 1948    Specimen: Urine, Clean Catch Updated: 12/14/20 2008     RBC, UA Too Numerous to Count /HPF      WBC, UA 0-2 /HPF      Bacteria, UA None Seen /HPF      Squamous Epithelial Cells, UA 0-2 /HPF      Hyaline Casts, UA 3-6 /LPF      Methodology Automated Microscopy    Urinalysis With Culture If Indicated - Urine, Clean Catch [[id number]]  (Abnormal) Collected: 12/14/20 1948    Specimen: Urine, Clean Catch Updated: 12/14/20 2006     Color, UA Dark Yellow     Comment: Result checked         Appearance, UA Turbid     Comment: Result checked         pH, UA 8.5     Specific Gravity, UA 1.025     Glucose, UA Negative     Ketones, UA 80 mg/dL (3+)     Bilirubin, UA Negative     Blood, UA Large (3+)     Protein, UA 30 mg/dL (1+)     Leuk Esterase, UA Negative     Nitrite, UA  Negative     Urobilinogen, UA 1.0 E.U./dL    Lipase [[id number]]  (Normal) Collected: 12/14/20 1926    Specimen: Blood Updated: 12/14/20 1956     Lipase 46 U/L     CBC & Differential [[id number]]  (Abnormal) Collected: 12/14/20 1926    Specimen: Blood Updated: 12/14/20 1946    Narrative:      The following orders were created for panel order CBC & Differential.  Procedure                               Abnormality         Status                     ---------                               -----------         ------                     CBC Auto Differential[[id number]]        Abnormal            Final result                 Please view results for these tests on the individual orders.    CBC Auto Differential [[id number]]  (Abnormal) Collected: 12/14/20 1926    Specimen: Blood Updated: 12/14/20 1946     WBC 14.20 10*3/mm3      RBC 5.01 10*6/mm3      Hemoglobin 14.8 g/dL      Hematocrit 44.2 %      MCV 88.1 fL      MCH 29.6 pg      MCHC 33.6 g/dL      RDW 12.7 %      RDW-SD 38.9 fl      MPV 7.9 fL      Platelets 436 10*3/mm3      Neutrophil % 89.5 %      Lymphocyte % 6.2 %      Monocyte % 4.2 %      Eosinophil % 0.0 %      Basophil % 0.1 %      Neutrophils, Absolute 12.70 10*3/mm3      Lymphocytes, Absolute 0.90 10*3/mm3      Monocytes, Absolute 0.60 10*3/mm3      Eosinophils, Absolute 0.00 10*3/mm3      Basophils, Absolute 0.00 10*3/mm3      nRBC 0.1 /100 WBC     Blood Culture - Blood, Hand, Right [[id number]] Collected: 12/14/20 1926    Specimen: Blood from Hand, Right Updated: 12/14/20 1932    Blood Culture - Blood, Hand, Left [[id number]] Collected: 12/14/20 1926    Specimen: Blood from Hand, Left Updated: 12/14/20 1932         No results found for: URINECX     Imaging Results (Last 7 Days)     ** No results found for the last 168 hours. **          Inpatient Meds:   Scheduled Meds:atenolol, 25 mg, Oral, Nightly  cefTRIAXone, 1 g, Intravenous, Q24H  cholecalciferol, 1,000 Units, Oral, Daily  dicyclomine, 20 mg, Oral,  TID  lisinopril, 20 mg, Oral, Daily  nortriptyline, 100 mg, Oral, Nightly  sodium chloride, 10 mL, Intravenous, Q12H  tamsulosin, 0.4 mg, Oral, Daily  vitamin B-12, 1,000 mcg, Oral, Daily       Continuous Infusions:sodium chloride, 100 mL/hr, Last Rate: 100 mL/hr (12/14/20 5952)       PRN Meds:.•  acetaminophen **OR** acetaminophen **OR** acetaminophen  •  HYDROcodone-acetaminophen  •  HYDROcodone-acetaminophen  •  influenza vaccine  •  ondansetron **OR** ondansetron  •  [COMPLETED] Insert peripheral IV **AND** sodium chloride  •  sodium chloride      Assessment/Plan     Active Problems:    Right flank pain    Right nephrolithiasis    Right ureteral calculus  Right hydronephrosis secondary to the calculus    Plan  We will get CT stone protocol stat to ensure that the stone is still present  Patient tentatively scheduled for a cystoscopy with right ureteroscopic stone extraction, possible laser lithotripsy, placement right ureteral stent at 10:00 this morning.  Risks, benefits, and alternatives discussed with the patient he wishes to proceed.      I discussed the patients findings and my recommendations with patient and nursing staff    Thank you for this  consult    Gerry Giron MD  12/15/20  06:43 EST

## 2020-12-15 NOTE — H&P
[facility] Medicine Services      Patient Name: Davian Arevalo  : 1985  MRN: [id number]  Primary Care Physician: Merlene Zamorano MD  Date of admission: 2020    Patient Care Team:  Merlene Zamorano MD as PCP - General (Family Medicine)  Juliane Jason MD as Consulting Physician (Rheumatology)  Merlene Zamorano MD          Subjective   History Present Illness     Chief Complaint:   Chief Complaint   Patient presents with   • Flank Pain         Mr. Arevalo is a 35 y.o.  presents to [facility] complaining of right flank pain with associated nausea and vomiting.         35-year-old male presents to the ER with a chief complaint of right flank pain with associated nausea and vomiting which has been intermittent since .  Patient was seen in the emergency room at that time and diagnosed with 3 mm right ureteral stone.  He was given pain medication and discharged home with recommendation to follow-up with urology.  Patient followed up with his PCP but did not see urology.  The patient was again seen in the emergency room on12/10/2020 with repeat CT showing movement of the stone but not clearance.  The patient reports subjective fever and chills.        Review of Systems   Constitution: Positive for chills and fever.   Gastrointestinal: Positive for abdominal pain, nausea and vomiting.   Genitourinary: Positive for dysuria and flank pain.   All other systems reviewed and are negative.        Personal History     Past Medical History:   Past Medical History:   Diagnosis Date   • Abdominal pain, chronic, right lower quadrant 2015   • Anxiety    • Autism 2014    mild ( [facility]   • Carpal tunnel syndrome 2018   • Constipation 2018   • Depression    • Dyslexia 2017   • Hypertension    • IBS (irritable bowel syndrome) 2018   • IBS (irritable bowel syndrome)    • Knee pain, left 2017   • Lower  back pain 01/30/2015   • Right cervical radiculopathy 01/07/2019   • Shoulder pain, right 02/14/2017   • Tinnitus    • Vitamin D deficiency 12/26/2017       Surgical History:      Past Surgical History:   Procedure Laterality Date   • ADENOIDECTOMY     • COLONOSCOPY     • TONSILLECTOMY  1993    Tonsillectomy and Adenoids-   • TONSILLECTOMY AND ADENOIDECTOMY             Family History: family history includes Atrial fibrillation in his mother; Cancer in his father and mother; Diabetes in his maternal grandfather, maternal grandmother, and paternal uncle; Hyperlipidemia in his father; Hypertension in his father and mother; Stroke in his father, maternal grandfather, and another family member. Otherwise pertinent FHx was reviewed and unremarkable.     Social History:  reports that he has never smoked. He has never used smokeless tobacco. He reports that he does not drink alcohol or use drugs.      Medications:  Prior to Admission medications    Medication Sig Start Date End Date Taking? Authorizing Provider   atenolol (TENORMIN) 25 MG tablet TAKE ONE TABLET BY MOUTH ONCE NIGHTLY 2/19/20  Yes Merlene Zamorano MD   cholecalciferol (VITAMIN D3) 1000 units tablet Take 1,000 Units by mouth Daily. 12/26/17  Yes Merlene Zamorano MD   dicyclomine (BENTYL) 20 MG tablet Take 20 mg by mouth 3 (Three) Times a Day. 8/13/14  Yes Merlene Zamorano MD   folic acid-vit B6-vit B12 (FOLBEE) 2.5-25-1 MG tablet tablet Take  by mouth Daily.   Yes ProviderIsabella MD   lisinopril (PRINIVIL,ZESTRIL) 20 MG tablet TAKE ONE TABLET BY MOUTH DAILY 8/6/20  Yes Merlene Zamorano MD   nortriptyline (PAMELOR) 50 MG capsule Take 100 mg by mouth Every Night. 8/13/14  Yes Merlene Zamorano MD   ondansetron ODT (ZOFRAN-ODT) 4 MG disintegrating tablet Place 1 tablet on the tongue Every 8 (Eight) Hours As Needed for Vomiting. 12/10/20  Yes Kal Weber MD   tamsulosin (FLOMAX) 0.4 MG capsule 24 hr capsule Take 1 capsule by mouth Daily. 12/11/20   Yes Merlene Zamorano MD   traMADol (ULTRAM) 50 MG tablet Take 1 tablet by mouth Every 6 (Six) Hours As Needed for Severe Pain . 12/10/20  Yes Kal Weber MD   vitamin B-12 (CYANOCOBALAMIN) 1000 MCG tablet Take 1,000 mcg by mouth Daily. 4/20/16  Yes Merlene Zamorano MD   acetaminophen (TYLENOL) 325 MG tablet Take 650 mg by mouth Every 6 (Six) Hours As Needed for Mild Pain .  12/14/20  ProviderIsabella MD   HYDROcodone-acetaminophen (NORCO) 5-325 MG per tablet Take 1 tablet by mouth Every 6 (Six) Hours As Needed for Moderate Pain . 11/25/20 12/14/20  Rosalia Chairez APRN   naproxen (NAPROSYN) 500 MG tablet Take 1 tablet by mouth 2 (Two) Times a Day With Meals. 7/5/19 12/14/20  Merlene Zamorano MD       Allergies:    Allergies   Allergen Reactions   • Ceclor [Cefaclor] Hives   • Amoxicillin Hives   • Cephalexin Hives   • Cloral Hives       Objective   Objective     Vital Signs  Temp:  [97.4 °F (36.3 °C)] 97.4 °F (36.3 °C)  Heart Rate:  [79] 79  Resp:  [18] 18  BP: (120-138)/(55-70) 138/63  SpO2:  [95 %-100 %] 100 %  on   ;   Device (Oxygen Therapy): room air  Body mass index is 27.94 kg/m².    Physical Exam  Constitutional:       Appearance: He is not ill-appearing.   HENT:      Head: Normocephalic and atraumatic.      Right Ear: External ear normal.      Left Ear: External ear normal.      Nose: Nose normal. No congestion or rhinorrhea.      Mouth/Throat:      Mouth: Mucous membranes are moist.   Eyes:      General: No scleral icterus.        Right eye: No discharge.         Left eye: No discharge.      Extraocular Movements: Extraocular movements intact.      Conjunctiva/sclera: Conjunctivae normal.      Pupils: Pupils are equal, round, and reactive to light.   Cardiovascular:      Rate and Rhythm: Normal rate and regular rhythm.      Pulses: Normal pulses.      Heart sounds: Normal heart sounds. No murmur.   Pulmonary:      Effort: Pulmonary effort is normal. No respiratory distress.      Breath  sounds: Normal breath sounds.   Abdominal:      General: Bowel sounds are normal. There is no distension.      Palpations: Abdomen is soft.      Tenderness: There is no abdominal tenderness.   Musculoskeletal: Normal range of motion.         General: No swelling.   Skin:     General: Skin is warm and dry.      Coloration: Skin is pale.   Neurological:      General: No focal deficit present.      Mental Status: He is alert and oriented to person, place, and time.   Psychiatric:         Mood and Affect: Mood normal.         Behavior: Behavior normal.         Results Review:  I have personally reviewed most recent lab results and radiology images and interpretations and agree with findings.    Results from last 7 days   Lab Units 12/14/20 1926   WBC 10*3/mm3 14.20*   HEMOGLOBIN g/dL 14.8   HEMATOCRIT % 44.2   PLATELETS 10*3/mm3 436     Results from last 7 days   Lab Units 12/14/20 1926   SODIUM mmol/L 140   POTASSIUM mmol/L 4.2   CHLORIDE mmol/L 98   CO2 mmol/L 28.0   BUN mg/dL 13   CREATININE mg/dL 1.11   GLUCOSE mg/dL 124*   CALCIUM mg/dL 10.4   ALT (SGPT) U/L 38   AST (SGOT) U/L 27   TROPONIN T ng/mL <0.010     Estimated Creatinine Clearance: 108.1 mL/min (by C-G formula based on SCr of 1.11 mg/dL).  Brief Urine Lab Results  (Last result in the past 365 days)      Color   Clarity   Blood   Leuk Est   Nitrite   Protein   CREAT   Urine HCG        12/14/20 1948 Dark Yellow  Comment:  Result checked  Turbid  Comment:  Result checked  Large (3+) Negative Negative 30 mg/dL (1+)               Microbiology Results (last 10 days)     ** No results found for the last 240 hours. **        EKG reviewed showing normal sinus rhythm, heart rate 83  ECG/EMG Results (most recent)     Procedure Component Value Units Date/Time    ECG 12 Lead [[id number]] Collected: 12/14/20 1947     Updated: 12/14/20 1949     QT Interval 377 ms     Narrative:      HEART RATE= 83  bpm  RR Interval= 724  ms  VT Interval= 146  ms  P Horizontal Axis= -15   deg  P Front Axis= 50  deg  QRSD Interval= 92  ms  QT Interval= 377  ms  QRS Axis= -13  deg  T Wave Axis= 79  deg  - BORDERLINE ECG -  Sinus rhythm  Low voltage, extremity and precordial leads  No previous ECG available for comparison  Electronically Signed By:   Date and Time of Study: 2020-12-14 19:47:10                    Ct Abdomen Pelvis Without Contrast    Result Date: 12/10/2020  1. On 11/25/2020, there was a 3 mm stone in the left ureteropelvic junction. Today this stone is in the distal right ureter just above the ureterovesical junction. It is causing mild hydronephrosis and perinephric stranding, very slightly increased from before. There is also mild distention of the right ureter. 2. Otherwise negative study.  Electronically Signed By-Maggie Huff MD On:12/10/2020 8:08 PM This report was finalized on [id number] by  Maggie Huff MD.        Estimated Creatinine Clearance: 108.1 mL/min (by C-G formula based on SCr of 1.11 mg/dL).    Assessment/Plan   Assessment/Plan       Active Hospital Problems    Diagnosis  POA   • Right flank pain [R10.9]  Yes      Resolved Hospital Problems   No resolved problems to display.     Retained ureteral stone, right, 3 mm with movement in the ureter but no clearance, mild hydronephrosis and perinephric stranding: Urology consult; analgesics and antibiotics; add Flomax 0.8 mg x 1; continue daily Flomax; hold tramadol    Hypertension, chronic: Continue atenolol; continue lisinopril    Vitamin D deficiency, chronic: Continue vitamin D    IBS, chronic: Continue Bentyl    Insomnia, chronic: Continue nortriptyline    Dietary supplementation, chronic: Vitamin B12      VTE Prophylaxis -   Mechanical Order History:      Ordered        12/14/20 2159  Place Sequential Compression Device  Once         12/14/20 2159  Maintain Sequential Compression Device  Continuous                 Pharmalogical Order History:     None          CODE STATUS:    Code Status and Medical  Interventions:   Ordered at: 12/14/20 2159     Code Status:    CPR     Medical Interventions (Level of Support Prior to Arrest):    Full       This patient has been examined wearing appropriate Personal Protective Equipment. 12/14/20      I discussed the patient's findings and my recommendations with patient.      Signature:Electronically signed by LEAH Osborne, 12/15/20, 3:22 AM EST.      [facility] Hospitalist Team

## 2020-12-15 NOTE — PLAN OF CARE
Goal Outcome Evaluation:  Plan of Care Reviewed With: patient  Progress: no change  Outcome Summary: Patient admitted to sips for renal stone. Dry heaving present at times but no vomiting. Patient states his pain is mainly in his neck from a herniated disk prior to this visit. Patient states he was to see a urologist OP for this renal issue but did not go to his appt. Resting at this time. VSS

## 2020-12-15 NOTE — ANESTHESIA POSTPROCEDURE EVALUATION
Patient: Davian Arevalo    Procedure Summary     Date: 12/15/20 Room / Location: [facility] OR 01 / [facility] MAIN OR    Anesthesia Start: 1011 Anesthesia Stop: 1035    Procedure: CYSTOSCOPY URETEROSCOPY RETROGRADE PYELOGRAM HOLMIUM LASER STENT INSERTION (Right ) Diagnosis:       Right flank pain      Right nephrolithiasis      (Right flank pain [R10.9])      (Right nephrolithiasis [N20.0])    Surgeon: Gerry Giron MD Provider: Jacob Viera MD    Anesthesia Type: general ASA Status: 2 - Emergent          Anesthesia Type: general    Vitals  Vitals Value Taken Time   /67 12/15/20 1043   Temp 98.4 °F (36.9 °C) 12/15/20 1038   Pulse 97 12/15/20 1044   Resp 21 12/15/20 1043   SpO2 100 % 12/15/20 1044   Vitals shown include unvalidated device data.        Post Anesthesia Care and Evaluation    Patient location during evaluation: PACU  Patient participation: complete - patient participated  Level of consciousness: awake  Pain scale: See nurse's notes for pain score.  Pain management: adequate  Airway patency: patent  Anesthetic complications: No anesthetic complications  PONV Status: none  Cardiovascular status: acceptable  Respiratory status: acceptable  Hydration status: acceptable    Comments: Patient seen and examined postoperatively; vital signs stable; SpO2 greater than or equal to 90%; cardiopulmonary status stable; nausea/vomiting adequately controlled; pain adequately controlled; no apparent anesthesia complications; patient discharged from anesthesia care when discharge criteria were met

## 2020-12-15 NOTE — OP NOTE
CYSTOSCOPY URETEROSCOPY RETROGRADE PYELOGRAM HOLMIUM LASER STENT INSERTION  Procedure Report    Patient Name:  Davian Arevalo  YOB: 1985    Date of Surgery:  12/15/2020     Indications: Right ureteral calculus    Pre-op Diagnosis:   Right flank pain [R10.9]  Right nephrolithiasis [N20.0]       Post-Op Diagnosis Codes:     * Right flank pain [R10.9]     * Right nephrolithiasis [N20.0]    Procedure/CPT® Codes:      Procedure(s):  CYSTOSCOPY,R URETEROSCOPIC stone extraction, placement right ureteral stent    Staff:  Surgeon(s):  Gerry Giron MD            was responsible for performing the following activities: None and their skilled assistance was necessary for the success of this case.    Anesthesia: General    Estimated Blood Loss: minimal    Implants:    Nothing was implanted during the procedure    Specimen:          ID Type Source Tests Collected by Time   1 (Not marked as sent) :  Calculus Ureter, Right STONE ANALYSIS Gerry Giron MD 12/15/2020 1026         Findings: Distal right ureteral calculus    Complications: None    Description of Procedure: Patient induced with general anesthesia and placed in dorsolithotomy position.  Prepped and draped in sterile fashion.  22 Angolan cystoscope introduced under direct vision.  Urethra is within normal limits.  Prostate was normal.  Bladder was normal throughout.  Both ureteral orifice visualized and are normal.  Guidewire passed through the cystoscope and up the right ureter under fluoroscopy.  Dual-lumen catheter used to dilate the distal ureter.  Rigid ureteroscope was then passed next the wire and up to the level of the stone.  There is a fair amount of edema and there is also quite a bit of bloody dark urine.  The stone was grasped using a nitinol basket and removed without difficulty.  Because the significant obstruction and swelling it was decided to leave a stent.  Indwelling wire was backloaded through the cystoscope and a 6 Angolan  by 26 cm ureteral stent was passed over the wire and up into the right kidney.  The wire was removed.  A good curl was seen in the right kidney under fluoroscopy.  A good curl was seen in the bladder endoscopically.  No string was left attached to the stent.  Patient's bladder was emptied and the cystoscope was removed.  The patient was taken out of the dorsolithotomy position and awakened from general anesthesia.  He was transported to the postanesthesia care unit in stable condition having tolerated the procedure well without any complications.    Patient will follow up in the office in approximately 1 week for a cystoscopy with stent removal as part of a planned staged procedure.      Gerry Giron MD     Date: 12/15/2020  Time: 10:35 EST

## 2020-12-15 NOTE — ED PROVIDER NOTES
Subjective   History:  Patient is a 35 year old male who presents to the ER with persistant righ tflank and righ tlower quadrant pain. He ws diagnosed with a kidney stone 4 days ago and has followed up with his PCP since that time but reports that pain has not decreased and if anything has increased over the last day. He was initiated on Flomax without significant relief.     Onset: >1 week  Location: right lower abdominal and flank   Duration: constant  Character: sharp pain   Aggravating/Alleviating factors: None  Radiation none  Severity: moderate            Review of Systems   Constitutional: Negative for chills, diaphoresis, fatigue and fever.   Respiratory: Negative for cough, choking and shortness of breath.    Cardiovascular: Negative for chest pain and palpitations.   Gastrointestinal: Positive for abdominal pain. Negative for nausea and vomiting.   Genitourinary: Positive for flank pain.   Skin: Negative.    Neurological: Negative.    Psychiatric/Behavioral: Negative.        Past Medical History:   Diagnosis Date   • Abdominal pain, chronic, right lower quadrant 01/30/2015   • Anxiety    • Autism 08/13/2014    mild ( Abstracted from [facility].)   • Carpal tunnel syndrome 02/22/2018   • Constipation 09/21/2018   • Depression    • Dyslexia 12/26/2017   • Hypertension    • IBS (irritable bowel syndrome) 09/21/2018   • IBS (irritable bowel syndrome)    • Knee pain, left 07/25/2017   • Lower back pain 01/30/2015   • Right cervical radiculopathy 01/07/2019   • Shoulder pain, right 02/14/2017   • Tinnitus    • Vitamin D deficiency 12/26/2017       Allergies   Allergen Reactions   • Ceclor [Cefaclor] Hives   • Amoxicillin Hives   • Cephalexin Hives   • Cloral Hives       Past Surgical History:   Procedure Laterality Date   • ADENOIDECTOMY     • COLONOSCOPY     • TONSILLECTOMY  1993    Tonsillectomy and Adenoids-   • TONSILLECTOMY AND ADENOIDECTOMY         Family History   Problem Relation Age of Onset   • Cancer  Mother    • Cancer Father    • Hypertension Father    • Stroke Father    • Hyperlipidemia Father    • Hypertension Mother    • Atrial fibrillation Mother    • Diabetes Paternal Uncle    • Diabetes Maternal Grandmother    • Stroke Maternal Grandfather    • Diabetes Maternal Grandfather    • Stroke Other        Social History     Socioeconomic History   • Marital status: Single     Spouse name: Not on file   • Number of children: Not on file   • Years of education: Not on file   • Highest education level: Not on file   Tobacco Use   • Smoking status: Never Smoker   • Smokeless tobacco: Never Used   Substance and Sexual Activity   • Alcohol use: Never     Frequency: Never   • Drug use: No   Social History Narrative    [employer], [employer]                Objective   Physical Exam  Vitals signs and nursing note reviewed.   Constitutional:       Appearance: He is well-developed.   HENT:      Head: Normocephalic and atraumatic.      Nose: Nose normal.   Eyes:      Pupils: Pupils are equal, round, and reactive to light.   Neck:      Musculoskeletal: Normal range of motion.   Pulmonary:      Effort: Pulmonary effort is normal.   Abdominal:      General: Abdomen is flat. Bowel sounds are normal. There is no distension.      Palpations: There is no mass.      Tenderness: There is no abdominal tenderness. There is no guarding or rebound.      Hernia: No hernia is present.   Musculoskeletal: Normal range of motion.   Skin:     General: Skin is warm and dry.   Neurological:      General: No focal deficit present.      Mental Status: He is alert and oriented to person, place, and time.   Psychiatric:         Mood and Affect: Mood normal.         Behavior: Behavior normal.         Thought Content: Thought content normal.         Judgment: Judgment normal.         Procedures           ED Course  ED Course as of Dec 14 2124   Mon Dec 14, 2020   1950 EKG interpreted by ER physician reviewed by myself.  Sinus rhythm rate of 83 no  previous on file.    [MG]      ED Course User Index  [MG] Thao Simeon MANOJ, CORINNA          No radiology results for the last day  Labs Reviewed   COMPREHENSIVE METABOLIC PANEL - Abnormal; Notable for the following components:       Result Value    Glucose 124 (*)     All other components within normal limits    Narrative:     GFR Normal >60  Chronic Kidney Disease <60  Kidney Failure <15     URINALYSIS W/ CULTURE IF INDICATED - Abnormal; Notable for the following components:    Color, UA Dark Yellow (*)     Appearance, UA Turbid (*)     pH, UA 8.5 (*)     Ketones, UA 80 mg/dL (3+) (*)     Blood, UA Large (3+) (*)     Protein, UA 30 mg/dL (1+) (*)     All other components within normal limits   CBC WITH AUTO DIFFERENTIAL - Abnormal; Notable for the following components:    WBC 14.20 (*)     Neutrophil % 89.5 (*)     Lymphocyte % 6.2 (*)     Monocyte % 4.2 (*)     Eosinophil % 0.0 (*)     Neutrophils, Absolute 12.70 (*)     All other components within normal limits   URINALYSIS, MICROSCOPIC ONLY - Abnormal; Notable for the following components:    RBC, UA Too Numerous to Count (*)     WBC, UA 0-2 (*)     All other components within normal limits   LIPASE - Normal   TROPONIN (IN-HOUSE) - Normal    Narrative:     Troponin T Reference Range:  <= 0.03 ng/mL-   Negative for AMI  >0.03 ng/mL-     Abnormal for myocardial necrosis.  Clinicians would have to utilize clinical acumen, EKG, Troponin and serial changes to determine if it is an Acute Myocardial Infarction or myocardial injury due to an underlying chronic condition.       Results may be falsely decreased if patient taking Biotin.     BLOOD CULTURE   BLOOD CULTURE   CBC AND DIFFERENTIAL    Narrative:     The following orders were created for panel order CBC & Differential.  Procedure                               Abnormality         Status                     ---------                               -----------         ------                     CBC Auto  Differential[[id number]]        Abnormal            Final result                 Please view results for these tests on the individual orders.   EXTRA TUBES    Narrative:     The following orders were created for panel order Extra Tubes.  Procedure                               Abnormality         Status                     ---------                               -----------         ------                     Light Blue Top[[id number]]                                   Final result               Gold Top - SST[[id number]]                                   Final result                 Please view results for these tests on the individual orders.   LIGHT BLUE TOP   GOLD TOP - SST     Medications   sodium chloride 0.9 % flush 10 mL (has no administration in time range)   cefTRIAXone (ROCEPHIN) in SWFI 1 gram/10ml IV PUSH syringe (has no administration in time range)   sodium chloride 0.9 % bolus 500 mL (0 mL Intravenous Stopped 12/14/20 2017)   famotidine (PEPCID) injection 20 mg (20 mg Intravenous Given 12/14/20 1950)   ondansetron (ZOFRAN) injection 4 mg (4 mg Intravenous Given 12/14/20 2022)   sodium chloride 0.9 % bolus 1,000 mL (1,000 mL Intravenous New Bag 12/14/20 2031)   ketorolac (TORADOL) injection 15 mg (15 mg Intravenous Given 12/14/20 2030)                                       MDM  Number of Diagnoses or Management Options  Right flank pain:   Right nephrolithiasis:   Diagnosis management comments: I examined the patient using the appropriate personal protective equipment.      DISPOSITION:   Chart Review:  Comorbidity:  has a past medical history of Abdominal pain, chronic, right lower quadrant (01/30/2015), Anxiety, Autism (08/13/2014), Carpal tunnel syndrome (02/22/2018), Constipation (09/21/2018), Depression, Dyslexia (12/26/2017), Hypertension, IBS (irritable bowel syndrome) (09/21/2018), IBS (irritable bowel syndrome), Knee pain, left (07/25/2017), Lower back pain (01/30/2015), Right cervical  radiculopathy (01/07/2019), Shoulder pain, right (02/14/2017), Tinnitus, and Vitamin D deficiency (12/26/2017).  Differentials:this list is not all inclusive and does not constitute the entirety of considered causes --> nephrolithiasis  Labs: UA shows 3+ ketones moderate amount of blood.  WBC 14.2    Imaging: Was interpreted by physician and reviewed by myself:  No radiology results for the last day    Disposition/Treatment:    Patient is a 35-year-old male who presents to the ER with persistent right flank pain for the last 2 weeks he was diagnosed with 3 mm stone never followed up with urologist in the outpatient setting return here 4 days ago his stone had progressed but had not passed yet.  He reports persistent pain.  Patient was managed in the hospitalist with urology consult ordered he was stable and in agreement with plan       Amount and/or Complexity of Data Reviewed  Clinical lab tests: reviewed  Tests in the medicine section of CPT®: reviewed  Decide to obtain previous medical records or to obtain history from someone other than the patient: yes    Patient Progress  Patient progress: stable      Final diagnoses:   Right flank pain   Right nephrolithiasis            Thao Simeon PA-C  12/14/20 2127

## 2020-12-15 NOTE — DISCHARGE SUMMARY
Date of Admission: 12/14/2020    Date of Discharge:  12/15/2020    Length of stay:  LOS: 0 days   Hospital Course  Chief Complaint:   Chief Complaint   Patient presents with   • Flank Pain       Mr. Arevalo is a 35 y.o.  presents to [facility] complaining of right flank pain with associated nausea and vomiting.      35-year-old male presents to the ER with a chief complaint of right flank pain with associated nausea and vomiting which has been intermittent since November 25.  Patient was seen in the emergency room at that time and diagnosed with 3 mm right ureteral stone.  He was given pain medication and discharged home with recommendation to follow-up with urology.  Patient followed up with his PCP but did not see urology.  The patient was again seen in the emergency room on12/10/2020 with repeat CT showing movement of the stone but not clearance.  The patient reports subjective fever and chills    Physical Exam   Constitutional:  oriented to person, place, and time. No distress.   HENT:   Head: Normocephalic and atraumatic.   Eyes: Conjunctivae and EOM are normal. Pupils are equal, round, and reactive to light.   Neck: No JVD present. No thyromegaly present.   Cardiovascular: Normal rate, regular rhythm, normal heart sounds and intact distal pulses. Exam reveals no gallop and no friction rub.   No murmur heard.  Pulmonary/Chest: Effort normal and breath sounds normal. No stridor. No respiratory distress.  has no wheezes.  has no rales.  exhibits no tenderness.   Abdominal: Soft. Bowel sounds are normal.  no distension and no mass. There is no tenderness. There is no rebound and no guarding. No hernia.   Musculoskeletal: Normal range of motion.   Lymphadenopathy:     no cervical adenopathy.   Neurological:  alert and oriented to person, place, and time. No cranial nerve deficit or sensory deficit. exhibits normal muscle tone.   Skin: No rash noted.  not diaphoretic.   Psychiatric:  normal mood and  affect.   Vitals reviewed.      Hospital course and problem list    Patient was admitted with above-mentioned presentation.  He was diagnosed with acute nephrolithiasis and mild right-sided hydroureteric nephrosis.  There was a 3 mm stone at the right uterocervical junction.  Patient was started on antibiotics, symptomatic management and IV fluids.  Urology was consulted and patient subsequently underwent cystoscopy and right ureteroscopic stone extraction with placement of a right ureteral stent.  Patient will be continued on Flomax, he was started on ciprofloxacin per urology which will be continued.  Otherwise his blood work is grossly normal there is a mild WBC elevation at 12.1.  Patient is afebrile he is nontoxic and he is doing well.  Home medications will be continued on discharge.    Plan is to discharge home in stable condition with follow-up with urology and PCP as an outpatient as long as patient is cleared from urology standpoint.      Pertinent Test Results:     Lab Results (last 48 hours)     Procedure Component Value Units Date/Time    STONE ANALYSIS - Calculus, Ureter, Right [[id number]] Collected: 12/15/20 1026    Specimen: Calculus from Ureter, Right Updated: 12/15/20 1054    COVID-19,CEPHEID,COR/NOAM/PAD IN-HOUSE(OR EMERGENT/ADD-ON),NP SWAB IN TRANSPORT MEDIA 3-4 HR TAT - Swab, Nasopharynx [[id number]]  (Normal) Collected: 12/15/20 0417    Specimen: Swab from Nasopharynx Updated: 12/15/20 0628     COVID19 Not Detected    Narrative:      Fact sheet for providers: https://www.fda.gov/media/913567/download     Fact sheet for patients: https://www.fda.gov/media/324035/download    Comprehensive Metabolic Panel [[id number]]  (Abnormal) Collected: 12/15/20 0304    Specimen: Blood Updated: 12/15/20 0346     Glucose 136 mg/dL      BUN 13 mg/dL      Creatinine 1.23 mg/dL      Sodium 137 mmol/L      Potassium 3.9 mmol/L      Chloride 102 mmol/L      CO2 27.0 mmol/L      Calcium 8.9 mg/dL      Total Protein 6.1  g/dL      Albumin 4.10 g/dL      ALT (SGPT) 28 U/L      AST (SGOT) 19 U/L      Alkaline Phosphatase 61 U/L      Total Bilirubin 0.4 mg/dL      eGFR Non African Amer 66 mL/min/1.73      Globulin 2.0 gm/dL      A/G Ratio 2.1 g/dL      BUN/Creatinine Ratio 10.6     Anion Gap 8.0 mmol/L     Narrative:      GFR Normal >60  Chronic Kidney Disease <60  Kidney Failure <15      aPTT [[id number]]  (Normal) Collected: 12/15/20 0304    Specimen: Blood Updated: 12/15/20 0327     PTT 26.2 seconds     Protime-INR [[id number]]  (Abnormal) Collected: 12/15/20 0304    Specimen: Blood Updated: 12/15/20 0327     Protime 12.3 Seconds      INR 1.12    CBC & Differential [[id number]]  (Abnormal) Collected: 12/15/20 0304    Specimen: Blood Updated: 12/15/20 0318    Narrative:      The following orders were created for panel order CBC & Differential.  Procedure                               Abnormality         Status                     ---------                               -----------         ------                     CBC Auto Differential[[id number]]        Abnormal            Final result                 Please view results for these tests on the individual orders.    CBC Auto Differential [[id number]]  (Abnormal) Collected: 12/15/20 0304    Specimen: Blood Updated: 12/15/20 0318     WBC 12.10 10*3/mm3      RBC 4.26 10*6/mm3      Hemoglobin 12.7 g/dL      Comment: Result checked         Hematocrit 37.4 %      MCV 87.7 fL      MCH 29.8 pg      MCHC 34.0 g/dL      RDW 12.4 %      RDW-SD 38.1 fl      MPV 7.1 fL      Platelets 388 10*3/mm3      Neutrophil % 83.1 %      Lymphocyte % 7.8 %      Monocyte % 8.9 %      Eosinophil % 0.1 %      Basophil % 0.1 %      Neutrophils, Absolute 10.10 10*3/mm3      Lymphocytes, Absolute 1.00 10*3/mm3      Monocytes, Absolute 1.10 10*3/mm3      Eosinophils, Absolute 0.00 10*3/mm3      Basophils, Absolute 0.00 10*3/mm3      nRBC 0.0 /100 WBC     Comprehensive Metabolic Panel [[id number]]  (Abnormal) Collected:  12/14/20 1926    Specimen: Blood Updated: 12/14/20 2101     Glucose 124 mg/dL      BUN 13 mg/dL      Creatinine 1.11 mg/dL      Sodium 140 mmol/L      Potassium 4.2 mmol/L      Comment: Slight hemolysis detected by analyzer. Results may be affected.        Chloride 98 mmol/L      CO2 28.0 mmol/L      Calcium 10.4 mg/dL      Total Protein 7.5 g/dL      Albumin 4.90 g/dL      ALT (SGPT) 38 U/L      AST (SGOT) 27 U/L      Comment: Slight hemolysis detected by analyzer. Results may be affected.        Alkaline Phosphatase 73 U/L      Total Bilirubin 0.6 mg/dL      eGFR Non African Amer 75 mL/min/1.73      Globulin 2.6 gm/dL      A/G Ratio 1.9 g/dL      BUN/Creatinine Ratio 11.7     Anion Gap 14.0 mmol/L     Narrative:      GFR Normal >60  Chronic Kidney Disease <60  Kidney Failure <15      Troponin [[id number]]  (Normal) Collected: 12/14/20 1926    Specimen: Blood Updated: 12/14/20 2101     Troponin T <0.010 ng/mL     Narrative:      Troponin T Reference Range:  <= 0.03 ng/mL-   Negative for AMI  >0.03 ng/mL-     Abnormal for myocardial necrosis.  Clinicians would have to utilize clinical acumen, EKG, Troponin and serial changes to determine if it is an Acute Myocardial Infarction or myocardial injury due to an underlying chronic condition.       Results may be falsely decreased if patient taking Biotin.      Extra Tubes [[id number]] Collected: 12/14/20 1926    Specimen: Blood, Venous Line Updated: 12/14/20 2030    Narrative:      The following orders were created for panel order Extra Tubes.  Procedure                               Abnormality         Status                     ---------                               -----------         ------                     Light Blue Top[[id number]]                                   Final result               Gold Top - [facility][[id number]]                                   Final result                 Please view results for these tests on the individual orders.    Light Blue Top  [[id number]] Collected: 12/14/20 1926    Specimen: Blood Updated: 12/14/20 2030     Extra Tube hold for add-on     Comment: Auto resulted       Gold Top - [facility] [[id number]] Collected: 12/14/20 1926    Specimen: Blood Updated: 12/14/20 2030     Extra Tube Hold for add-ons.     Comment: Auto resulted.       Urinalysis, Microscopic Only - Urine, Clean Catch [[id number]]  (Abnormal) Collected: 12/14/20 1948    Specimen: Urine, Clean Catch Updated: 12/14/20 2008     RBC, UA Too Numerous to Count /HPF      WBC, UA 0-2 /HPF      Bacteria, UA None Seen /HPF      Squamous Epithelial Cells, UA 0-2 /HPF      Hyaline Casts, UA 3-6 /LPF      Methodology Automated Microscopy    Urinalysis With Culture If Indicated - Urine, Clean Catch [[id number]]  (Abnormal) Collected: 12/14/20 1948    Specimen: Urine, Clean Catch Updated: 12/14/20 2006     Color, UA Dark Yellow     Comment: Result checked         Appearance, UA Turbid     Comment: Result checked         pH, UA 8.5     Specific Gravity, UA 1.025     Glucose, UA Negative     Ketones, UA 80 mg/dL (3+)     Bilirubin, UA Negative     Blood, UA Large (3+)     Protein, UA 30 mg/dL (1+)     Leuk Esterase, UA Negative     Nitrite, UA Negative     Urobilinogen, UA 1.0 E.U./dL    Lipase [[id number]]  (Normal) Collected: 12/14/20 1926    Specimen: Blood Updated: 12/14/20 1956     Lipase 46 U/L     CBC & Differential [[id number]]  (Abnormal) Collected: 12/14/20 1926    Specimen: Blood Updated: 12/14/20 1946    Narrative:      The following orders were created for panel order CBC & Differential.  Procedure                               Abnormality         Status                     ---------                               -----------         ------                     CBC Auto Differential[[id number]]        Abnormal            Final result                 Please view results for these tests on the individual orders.    CBC Auto Differential [[id number]]  (Abnormal) Collected: 12/14/20 1926     Specimen: Blood Updated: 12/14/20 1946     WBC 14.20 10*3/mm3      RBC 5.01 10*6/mm3      Hemoglobin 14.8 g/dL      Hematocrit 44.2 %      MCV 88.1 fL      MCH 29.6 pg      MCHC 33.6 g/dL      RDW 12.7 %      RDW-SD 38.9 fl      MPV 7.9 fL      Platelets 436 10*3/mm3      Neutrophil % 89.5 %      Lymphocyte % 6.2 %      Monocyte % 4.2 %      Eosinophil % 0.0 %      Basophil % 0.1 %      Neutrophils, Absolute 12.70 10*3/mm3      Lymphocytes, Absolute 0.90 10*3/mm3      Monocytes, Absolute 0.60 10*3/mm3      Eosinophils, Absolute 0.00 10*3/mm3      Basophils, Absolute 0.00 10*3/mm3      nRBC 0.1 /100 WBC     Blood Culture - Blood, Hand, Right [[id number]] Collected: 12/14/20 1926    Specimen: Blood from Hand, Right Updated: 12/14/20 1932    Blood Culture - Blood, Hand, Left [[id number]] Collected: 12/14/20 1926    Specimen: Blood from Hand, Left Updated: 12/14/20 1932                 Imaging Results (All)     Procedure Component Value Units Date/Time    CT Abdomen Pelvis Stone Protocol [[id number]] Collected: 12/15/20 0759     Updated: 12/15/20 0819    Narrative:      CT ABDOMEN PELVIS STONE PROTOCOL-     Date of Exam: 12/15/2020 7:35 AM     Indication: Urinary tract stone, symptomatic/complicated;  R10.9-Unspecified abdominal pain; N20.0-Calculus of kidney.     Comparison Exams: 12/10/2020     Technique: Multiple axial images were obtained from the lung bases  through the kidneys without IV contrast. Coronal and sagittal  reconstructions were performed.  Automated exposure control and iterative reconstruction methods were  used.     FINDINGS:  Visualized lung bases are clear.  The liver, pancreas, and spleen are  within normal limits.  Bilateral adrenal glands are normal.  There is  mild right-sided hydroureteronephrosis which appears unchanged from the  prior exam.  There is an obstructing 3 mm stone at the right  ureterovesical junction.  This stone has moved more distally when  compared to the prior exam.  There is  "an increasing amount of.  Ureteral  and perinephric fat stranding on the right.  No other renal or ureteral  stones identified.  No left-sided hydronephrosis.     Gallbladder appears normal.  No evidence biliary tract obstruction.   There is no abdominal or retroperitoneal adenopathy.  The upper GI tract  is within normal limits.     Pelvis: Urinary bladder is within normal limits.  There are multiple  sigmoid diverticula but no evidence of diverticulitis.  The appendix is  normal there is no pelvic or inguinal adenopathy.     There are no lytic or sclerotic bony lesions identified.       Impression:         1.  Mild right-sided hydroureteronephrosis secondary to an obstructing 3  mm stone which now lies at the right ureterovesical junction.   Hydronephrosis appears stable from 12/10/2020.  The stone has moved  several centimeters more distally in the interval.  2.  Colonic diverticulosis but no evidence of diverticulitis.     Electronically Signed By-Don Burt MD On:12/15/2020 8:05 AM  This report was finalized on [id number] by  Don Burt MD.            Vital Signs  Visit Vitals  /79 (BP Location: Right arm, Patient Position: Lying)   Pulse 88   Temp 97.7 °F (36.5 °C) (Axillary)   Resp 17   Ht 182.9 cm (72\")   Wt 94.8 kg (208 lb 15.9 oz)   SpO2 97%   BMI 28.34 kg/m²       Physical Exam:  Physical Exam      Discharge Medications     Discharge Medications      New Medications      Instructions Start Date   ciprofloxacin 500 MG tablet  Commonly known as: Cipro   500 mg, Oral, 2 Times Daily      HYDROcodone-acetaminophen 7.5-325 MG per tablet  Commonly known as: Norco   1 tablet, Oral, Every 6 Hours PRN         Continue These Medications      Instructions Start Date   atenolol 25 MG tablet  Commonly known as: TENORMIN   TAKE ONE TABLET BY MOUTH ONCE NIGHTLY      cholecalciferol 25 MCG (1000 UT) tablet  Commonly known as: VITAMIN D3   1,000 Units, Oral, Daily      dicyclomine 20 MG tablet  Commonly " known as: BENTYL   20 mg, Oral, 3 Times Daily      folic acid-vit B6-vit B12 2.5-25-1 MG tablet tablet  Commonly known as: FOLBEE   Oral, Daily      lisinopril 20 MG tablet  Commonly known as: PRINIVILZESTRIL   TAKE ONE TABLET BY MOUTH DAILY      nortriptyline 50 MG capsule  Commonly known as: PAMELOR   100 mg, Oral, Nightly      ondansetron ODT 4 MG disintegrating tablet  Commonly known as: ZOFRAN-ODT   4 mg, Translingual, Every 8 Hours PRN      tamsulosin 0.4 MG capsule 24 hr capsule  Commonly known as: FLOMAX   0.4 mg, Oral, Daily      traMADol 50 MG tablet  Commonly known as: ULTRAM   50 mg, Oral, Every 6 Hours PRN      vitamin B-12 1000 MCG tablet  Commonly known as: CYANOCOBALAMIN   1,000 mcg, Oral, Daily             Discharge Diet:   Diet Instructions     Diet: Regular      Discharge Diet: Regular          Activity at Discharge:   Activity Instructions     Activity as Tolerated            Follow-up Appointments  No future appointments.  Additional Instructions for the Follow-ups that You Need to Schedule     Discharge Follow-up with PCP   As directed       Currently Documented PCP:    Merlene Zamorano MD    PCP Phone Number:    [phone]     Follow Up Details: 1 week         Discharge Follow-up with Specialty: Urology 1 week   As directed      Specialty: Urology 1 week                   Mindy Hidalgo MD  12/15/20  12:07 EST    Time: Discharge 38 min

## 2020-12-15 NOTE — PROGRESS NOTES
Continued Stay Note   [facility]     Patient Name: Davian Arevalo  MRN: [id number]  Today's Date: 12/15/2020    Admit Date: 12/14/2020    Discharge Plan     Row Name 12/15/20 2348       Plan    Plan Comments  [facility] spoke to pt mother via telephone. [facility] mailed transportation resource and specifically address [facility]. Mother cannot  pt at this time. [facility] arranged [employer].    Row Name 12/15/20 1353               Discharge Codes    No documentation.       Expected Discharge Date and Time     Expected Discharge Date Expected Discharge Time    Dec 15, 2020           MIRANDA Rizo    Phone # [phone]  Cell #[phone]  Fax#[phone]  [email or web address]    MIRANDA Rizo

## 2020-12-15 NOTE — PROGRESS NOTES
Continued Stay Note  ZINA Galindo     Patient Name: Davian Arevalo  MRN: [id number]  Today's Date: 12/15/2020    Admit Date: 12/14/2020    Discharge Plan     Row Name 12/15/20 3723       Plan    Plan Comments  CM called and spoke to patient's mother, Juliane. Informed mother that patient would need follow-up with Dr Giron and PCP in 1 week-CM provided contact information to MD offices-mother to call and schedule and then arrange transportation with [facility]. CM notified bedside RN.    Row Name 12/15/20 1959       Plan    Plan  D/C Plan: Anticipate routine home.    Patient/Family in Agreement with Plan  yes    Plan Comments   unable to speak to patient as he was off the floor for procedure.  called and spoke to patient's mother, Juliane. Patient lives alone, is IADLs and drives. Parents assist patient as needed, but mother voices concerns for transportation if patient is unable to drive himself. Mother states parents lives 30 minutes away. Patient is current with family support waiver through Medicaid, but [facility] states services have been limited due to COVID. CM notified SW of potential transportation issues per patient's mother. PCP and pharmacy verified-denies any difficulty affording meds. Barrier to D/C: cysto with stent placement today.              Discharge Planning Assessment  ZINA Galindo     Patient Name: Davian Arevalo  MRN: [id number]  Today's Date: 12/15/2020    Admit Date: 12/14/2020    Discharge Needs Assessment     Row Name 12/15/20 1248       Living Environment    Lives With  alone    Current Living Arrangements  home/apartment/condo    Primary Care Provided by  self    Provides Primary Care For  no one    Family Caregiver if Needed  parent(s)    Quality of Family Relationships  helpful    Able to Return to Prior Arrangements  yes       Resource/Environmental Concerns    Resource/Environmental Concerns  none    Transportation Concerns  car, none       Transition Planning     Patient/Family Anticipates Transition to  home    Patient/Family Anticipated Services at Transition  none    Transportation Anticipated  family or friend will provide;car, drives self       Discharge Needs Assessment    Readmission Within the Last 30 Days  no previous admission in last 30 days    Equipment Currently Used at Home  none    Concerns to be Addressed  denies needs/concerns at this time    Anticipated Changes Related to Illness  none    Equipment Needed After Discharge  none        Discharge Plan     Row Name 12/15/20 1248       Plan    Plan  D/C Plan: Anticipate routine home.    Patient/Family in Agreement with Plan  yes    Plan Comments   unable to speak to patient as he was off the floor for procedure.  called and spoke to patient's mother, Juliane. Patient lives alone, is IADLs and drives. Parents assist patient as needed, but mother voices concerns for transportation if patient is unable to drive himself. Mother states parents lives 30 minutes away. Patient is current with family support waiver through Medicaid, but mother states services have been limited due to COVID. CM notified SW of potential transportation issues per patient's mother. PCP and pharmacy verified-denies any difficulty affording meds. Barrier to D/C: cysto with stent placement today.          Expected Discharge Date and Time     Expected Discharge Date Expected Discharge Time    Dec 15, 2020         Demographic Summary     Row Name 12/15/20 1243       General Information    Admission Type  observation    Arrived From  emergency department    Referral Source  admission list    Reason for Consult  discharge planning    Preferred Language  English     Used During This Interaction  no        Functional Status     Row Name 12/15/20 1248       Functional Status    Usual Activity Tolerance  good    Current Activity Tolerance  moderate       Functional Status, IADL    Medications  independent    Meal  Preparation  independent    Housekeeping  independent    Laundry  independent    Shopping  independent       Mental Status Summary    Recent Changes in Mental Status/Cognitive Functioning  unable to assess        Roxanna Martinez

## 2020-12-16 ENCOUNTER — TRANSITIONAL CARE MANAGEMENT TELEPHONE ENCOUNTER (OUTPATIENT)
Dept: CALL CENTER | Facility: HOSPITAL | Age: 35
End: 2020-12-16

## 2020-12-16 LAB — QT INTERVAL: 377 MS

## 2020-12-16 NOTE — OUTREACH NOTE
Call Center TCM Note      Responses   [facility] [place] patient discharged from?  [facility]   Does the patient have one of the following disease processes/diagnoses(primary or secondary)?  Other   TCM attempt successful?  Yes   Call start time  1536   Call end time  1541   Discharge diagnosis  acute nephrolithiasis and mild right-sided hydroureteric nephrosis,      underwent cystoscopy and right ureteroscopic stone extraction with placement of a right ureteral stent   Is patient permission given to speak with other caregiver?  No   Meds reviewed with patient/caregiver?  Yes   Is the patient having any side effects they believe may be caused by any medication additions or changes?  No   Does the patient have all medications ordered at discharge?  Yes   Is the patient taking all medications as directed (includes completed medication regime)?  Yes   Does the patient have a primary care provider?   Yes   Does the patient have an appointment with their PCP within 7 days of discharge?  Greater than 7 days   Comments regarding PCP  PCP Dr Merlene Zamorano. Hospital follow up scheduled for 12/29  130pm.    Nursing Interventions  -- [Scheduled follow up appt. ]   Has the patient kept scheduled appointments due by today?  N/A   Comments  Patient reports seeing urology on Monday 12/21.    Has home health visited the patient within 72 hours of discharge?  N/A   Psychosocial issues?  No   Did the patient receive a copy of their discharge instructions?  Yes   Nursing interventions  Reviewed instructions with patient   What is the patient's perception of their health status since discharge?  Same [Patient reports passing bloody urine. ]   Is the patient/caregiver able to teach back signs and symptoms related to disease process for when to call PCP?  Yes   Is the patient/caregiver able to teach back the hierarchy of who to call/visit for symptoms/problems? PCP, Specialist, Home health nurse, Urgent Care, ED, 911  Yes   If the patient is  a current smoker, are they able to teach back resources for cessation?  Not a smoker   TCM call completed?  Yes          Thao Shah RN    12/16/2020, 15:41 EST

## 2020-12-16 NOTE — PROGRESS NOTES
Case Management Discharge Note      Final Note: Home         Selected Continued Care - Discharged on 12/15/2020 Admission date: 12/14/2020 - Discharge disposition: Home or Self Care              Final Discharge Disposition Code: 01 - home or self-care

## 2020-12-16 NOTE — OUTREACH NOTE
Call Center TCM Note      Responses   [facility] patient discharged from?  [facility]   Does the patient have one of the following disease processes/diagnoses(primary or secondary)?  Other   TCM attempt successful?  No   Unsuccessful attempts  Attempt 1          Thao Shah RN    12/16/2020, 12:09 EST

## 2020-12-19 LAB
BACTERIA SPEC AEROBE CULT: NORMAL
BACTERIA SPEC AEROBE CULT: NORMAL

## 2020-12-22 LAB
CALCIUM OXALATE DIHYDRATE MFR STONE IR: 60 %
COLOR STONE: NORMAL
COM MFR STONE: 40 %
COMPN STONE: NORMAL
LABORATORY COMMENT REPORT: NORMAL
Lab: NORMAL
Lab: NORMAL
PHOTO: NORMAL
SIZE STONE: NORMAL MM
SPEC SOURCE SUBJ: NORMAL
WT STONE: 18 MG

## 2020-12-29 ENCOUNTER — OFFICE VISIT (OUTPATIENT)
Dept: FAMILY MEDICINE CLINIC | Facility: CLINIC | Age: 35
End: 2020-12-29

## 2020-12-29 VITALS
HEART RATE: 98 BPM | DIASTOLIC BLOOD PRESSURE: 76 MMHG | SYSTOLIC BLOOD PRESSURE: 114 MMHG | WEIGHT: 213 LBS | TEMPERATURE: 97.3 F | BODY MASS INDEX: 28.89 KG/M2 | OXYGEN SATURATION: 96 %

## 2020-12-29 DIAGNOSIS — H66.001 NON-RECURRENT ACUTE SUPPURATIVE OTITIS MEDIA OF RIGHT EAR WITHOUT SPONTANEOUS RUPTURE OF TYMPANIC MEMBRANE: ICD-10-CM

## 2020-12-29 DIAGNOSIS — R42 DIZZINESS: ICD-10-CM

## 2020-12-29 DIAGNOSIS — N20.0 RIGHT RENAL STONE: Primary | ICD-10-CM

## 2020-12-29 PROCEDURE — 99214 OFFICE O/P EST MOD 30 MIN: CPT | Performed by: FAMILY MEDICINE

## 2020-12-29 RX ORDER — AZITHROMYCIN 250 MG/1
TABLET, FILM COATED ORAL
Qty: 6 TABLET | Refills: 0 | Status: SHIPPED | OUTPATIENT
Start: 2020-12-29 | End: 2021-04-20

## 2020-12-29 NOTE — PROGRESS NOTES
Transitional Care Follow Up Visit  Subjective     Davian Arevalo is a 35 y.o. male who presents for a transitional care management visit.    Within 48 business hours after discharge our office contacted him via telephone to coordinate his care and needs.      I reviewed and discussed the details of that call along with the discharge summary, hospital problems, inpatient lab results, inpatient diagnostic studies, and consultation reports with Davian.     Current outpatient and discharge medications have been reconciled for the patient.  Reviewed by: Merlene Zamorano MD      Date of TCM Phone Call 12/15/2020   [facility]   Date of Admission 12/14/2020   Date of Discharge 12/15/2020   Discharge Disposition Home or Self Care     Risk for Readmission (LACE) Score: 3 (12/15/2020  6:00 AM)      Flank Pain  This is a new problem. The current episode started 1 to 4 weeks ago. The problem has been resolved since onset. The pain is present in the thoracic spine. The quality of the pain is described as aching and shooting. The pain is severe. Associated symptoms include abdominal pain. Pertinent negatives include no chest pain, fever or headaches.   Dizziness  This is a new problem. The current episode started in the past 7 days. The problem occurs 2 to 4 times per day. The problem has been unchanged. Associated symptoms include abdominal pain and fatigue. Pertinent negatives include no chest pain, coughing, fever, headaches, nausea, sore throat, swollen glands or visual change. The symptoms are aggravated by standing.   Ear Fullness   There is pain in the right ear. This is a new problem. The current episode started in the past 7 days. The problem occurs constantly. The problem has been unchanged. There has been no fever. The pain is moderate. Associated symptoms include abdominal pain. Pertinent negatives include no coughing, ear discharge, headaches, rhinorrhea or sore throat. He has tried nothing for  the symptoms.      Course During Hospital Stay:  He was diagnosed with acute nephrolithiasis and mild right-sided hydroureteric nephrosis.  There was a 3 mm stone at the right uterocervical junction.  Patient was started on antibiotics, symptomatic management and IV fluids.  Urology was consulted and patient subsequently underwent cystoscopy and right ureteroscopic stone extraction with placement of a right ureteral stent.      The following portions of the patient's history were reviewed and updated as appropriate: allergies, current medications, past family history, past medical history, past social history, past surgical history and problem list.    Review of Systems   Constitutional: Positive for fatigue. Negative for activity change, appetite change and fever.   HENT: Negative for ear discharge, rhinorrhea and sore throat.    Eyes: Negative for discharge.   Respiratory: Negative for cough, choking and chest tightness.    Cardiovascular: Negative for chest pain.   Gastrointestinal: Positive for abdominal pain. Negative for nausea.   Genitourinary: Positive for flank pain.   Allergic/Immunologic: Positive for environmental allergies.   Neurological: Positive for dizziness. Negative for headaches.   Hematological: Negative for adenopathy.   Psychiatric/Behavioral: Negative for agitation.       Objective   Physical Exam  Constitutional:       General: He is not in acute distress.     Appearance: He is well-developed.   HENT:      Head: Normocephalic.      Right Ear: Tympanic membrane is erythematous.   Eyes:      General: Lids are normal.      Conjunctiva/sclera: Conjunctivae normal.   Neck:      Musculoskeletal: Normal range of motion.      Thyroid: No thyroid mass or thyromegaly.      Trachea: Trachea normal.   Cardiovascular:      Rate and Rhythm: Normal rate and regular rhythm.      Heart sounds: Normal heart sounds.   Pulmonary:      Effort: Pulmonary effort is normal.      Breath sounds: Normal breath sounds.    Abdominal:      Palpations: Abdomen is soft.   Lymphadenopathy:      Cervical: No cervical adenopathy.   Skin:     General: Skin is warm and dry.   Neurological:      Mental Status: He is alert and oriented to person, place, and time.   Psychiatric:         Attention and Perception: He is attentive.         Mood and Affect: Mood normal.         Speech: Speech normal.         Behavior: Behavior normal.         Assessment/Plan   Diagnoses and all orders for this visit:    1. Right renal stone (Primary)  Comments:  Doing better s/p stone removal and stent placement and removal.  Follow up with urology Jan 21.    2. Dizziness  Comments:  Likely due to ear infection.    3. Non-recurrent acute suppurative otitis media of right ear without spontaneous rupture of tympanic membrane  Comments:  Treat with Zpak as prescribed.  Orders:  -     azithromycin (Zithromax Z-Savage) 250 MG tablet; Take 2 tablets the first day, then 1 tablet daily for 4 days.  Dispense: 6 tablet; Refill: 0

## 2021-02-05 RX ORDER — LISINOPRIL 20 MG/1
TABLET ORAL
Qty: 90 TABLET | Refills: 3 | Status: SHIPPED | OUTPATIENT
Start: 2021-02-05 | End: 2022-05-02

## 2021-03-18 ENCOUNTER — TELEPHONE (OUTPATIENT)
Dept: FAMILY MEDICINE CLINIC | Facility: CLINIC | Age: 36
End: 2021-03-18

## 2021-03-18 NOTE — TELEPHONE ENCOUNTER
Pt's mother called to provide a phone number for her son to be able to received the covid 19 vaccine. Spoke to Medicaid and in order for an individual in the age range of 16-49, they need to call to the physician's line so he can qualify for the family support vaccination program.    The number to call is [phone]    If there's any questions or concerns, please call Juliane at [phone]

## 2021-03-26 NOTE — TELEPHONE ENCOUNTER
Everyone over age 16 is eligible for the vaccine starting 3/31/21.  He can go online [email or web address] and register then.

## 2021-04-06 RX ORDER — ATENOLOL 25 MG/1
TABLET ORAL
Qty: 90 TABLET | Refills: 2 | Status: SHIPPED | OUTPATIENT
Start: 2021-04-06 | End: 2022-02-04

## 2021-04-08 ENCOUNTER — OFFICE VISIT (OUTPATIENT)
Dept: FAMILY MEDICINE CLINIC | Facility: CLINIC | Age: 36
End: 2021-04-08

## 2021-04-08 ENCOUNTER — LAB (OUTPATIENT)
Dept: FAMILY MEDICINE CLINIC | Facility: CLINIC | Age: 36
End: 2021-04-08

## 2021-04-08 VITALS
HEART RATE: 79 BPM | SYSTOLIC BLOOD PRESSURE: 99 MMHG | BODY MASS INDEX: 30.34 KG/M2 | WEIGHT: 224 LBS | OXYGEN SATURATION: 96 % | DIASTOLIC BLOOD PRESSURE: 67 MMHG | RESPIRATION RATE: 16 BRPM | TEMPERATURE: 97.1 F | HEIGHT: 72 IN
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
## 2021-04-08 DIAGNOSIS — L98.9 SKIN LESION: Primary | ICD-10-CM

## 2021-04-08 PROCEDURE — 86622 BRUCELLA ANTIBODY: CPT | Performed by: FAMILY MEDICINE

## 2021-04-08 PROCEDURE — 86666 EHRLICHIA ANTIBODY: CPT | Performed by: FAMILY MEDICINE

## 2021-04-08 PROCEDURE — 86757 RICKETTSIA ANTIBODY: CPT | Performed by: FAMILY MEDICINE

## 2021-04-08 PROCEDURE — 86618 LYME DISEASE ANTIBODY: CPT | Performed by: FAMILY MEDICINE

## 2021-04-08 PROCEDURE — 99213 OFFICE O/P EST LOW 20 MIN: CPT | Performed by: FAMILY MEDICINE

## 2021-04-08 RX ORDER — DOXYCYCLINE HYCLATE 100 MG
100 TABLET ORAL 2 TIMES DAILY
Qty: 14 TABLET | Refills: 0 | Status: SHIPPED | OUTPATIENT
Start: 2021-04-08 | End: 2021-04-15

## 2021-04-08 NOTE — PROGRESS NOTES
Subjective   Chief Complaint   Patient presents with   • Found a tick on the left scrotum couple of days ago     Area is somehow red now     Davian Arevalo is a 35 y.o. male.     Patient Care Team:  Merlene Zamorano MD as PCP - General (Family Medicine)  Juliane Jason MD as Consulting Physician (Rheumatology)  Merlene Zamorano MD    He is coming in today due to thick issues.  He tells me that couple of days ago he found a tick on the left scrotum area, he was able to remove the tick completely, however the area is somehow red and causing some mild discomfort and he is concerned and wants that to be checked.  He denies any skin rashes.  He denies any vomiting or diarrhea.  He denies any headaches, fever, or chills.       The following portions of the patient's history were reviewed and updated as appropriate: allergies, current medications, past family history, past medical history, past social history, past surgical history and problem list.  Past Medical History:   Diagnosis Date   • Abdominal pain, chronic, right lower quadrant 01/30/2015   • Anxiety    • Autism 08/13/2014    m[facility] ( [facility]   • Carpal tunnel syndrome 02/22/2018   • Constipation 09/21/2018   • Depression    • Dyslexia 12/26/2017   • Hypertension    • IBS (irritable bowel syndrome) 09/21/2018   • IBS (irritable bowel syndrome)    • Knee pain, left 07/25/2017   • Lower back pain 01/30/2015   • Right cervical radiculopathy 01/07/2019   • Shoulder pain, right 02/14/2017   • Tinnitus    • Vitamin D deficiency 12/26/2017     Past Surgical History:   Procedure Laterality Date   • ADENOIDECTOMY     • COLONOSCOPY     • CYSTOSCOPY, URETEROSCOPY, RETROGRADE PYELOGRAM, STENT INSERTION Right 12/15/2020    Procedure: CYSTOSCOPY URETEROSCOPY STONE BASKET EXTRACTION AND STENT INSERTION;  Surgeon: Gerry Giron MD;  Location: [facility] MAIN OR;  Service: Urology;  Laterality: Right;   • TONSILLECTOMY  1993    Tonsillectomy and  "Adenoids-   • TONSILLECTOMY AND ADENOIDECTOMY       The patient has a family history of  Family History   Problem Relation Age of Onset   • Cancer Mother    • Cancer Father    • Hypertension Father    • Stroke Father    • Hyperlipidemia Father    • Hypertension Mother    • Atrial fibrillation Mother    • Diabetes Paternal Uncle    • Diabetes Maternal Grandmother    • Stroke Maternal Grandfather    • Diabetes Maternal Grandfather    • Stroke Other      Social History     Socioeconomic History   • Marital status: Single     Spouse name: Not on file   • Number of children: Not on file   • Years of education: Not on file   • Highest education level: Not on file   Tobacco Use   • Smoking status: Never Smoker   • Smokeless tobacco: Never Used   Substance and Sexual Activity   • Alcohol use: Never   • Drug use: No       Review of Systems   Constitutional: Negative for chills, fatigue and fever.   Gastrointestinal: Negative for abdominal pain, diarrhea, nausea and vomiting.   Skin: Positive for color change, rash and skin lesions. Negative for dry skin.     Visit Vitals  BP 99/67 (BP Location: Left arm, Patient Position: Sitting, Cuff Size: Adult)   Pulse 79   Temp 97.1 °F (36.2 °C) (Temporal)   Resp 16   Ht 182.9 cm (72\")   Wt 102 kg (224 lb)   SpO2 96%   BMI 30.38 kg/m²       Current Outpatient Medications:   •  atenolol (TENORMIN) 25 MG tablet, TAKE ONE TABLET BY MOUTH ONCE NIGHTLY, Disp: 90 tablet, Rfl: 2  •  azithromycin (Zithromax Z-Savage) 250 MG tablet, Take 2 tablets the first day, then 1 tablet daily for 4 days., Disp: 6 tablet, Rfl: 0  •  cholecalciferol (VITAMIN D3) 1000 units tablet, Take 1,000 Units by mouth Daily., Disp: , Rfl:   •  dicyclomine (BENTYL) 20 MG tablet, Take 20 mg by mouth 3 (Three) Times a Day., Disp: , Rfl:   •  doxycycline (VIBRAMYICN) 100 MG tablet, Take 1 tablet by mouth 2 (Two) Times a Day for 7 days., Disp: 14 tablet, Rfl: 0  •  folic acid-vit B6-vit B12 (FOLBEE) 2.5-25-1 MG tablet tablet, " Take  by mouth Daily., Disp: , Rfl:   •  lisinopril (PRINIVIL,ZESTRIL) 20 MG tablet, TAKE ONE TABLET BY MOUTH DAILY, Disp: 90 tablet, Rfl: 3  •  nortriptyline (PAMELOR) 50 MG capsule, Take 100 mg by mouth Every Night., Disp: , Rfl:   •  vitamin B-12 (CYANOCOBALAMIN) 1000 MCG tablet, Take 1,000 mcg by mouth Daily., Disp: , Rfl:     Objective   Physical Exam  Constitutional:       General: He is not in acute distress.     Appearance: Normal appearance. He is well-developed. He is not ill-appearing or diaphoretic.      Comments: Patient is in no distress, patient has normal voice and speech.  Normal respiratory effort.   HENT:      Head: Normocephalic and atraumatic.   Pulmonary:      Effort: Pulmonary effort is normal.   Genitourinary:     Comments: There is a small area of redness and slight induration noted on the anterior part of the left scrotum, it is very slightly tender to touch, no fluctuation.  Musculoskeletal:      Cervical back: Normal range of motion and neck supple.   Neurological:      General: No focal deficit present.      Mental Status: He is alert and oriented to person, place, and time. Mental status is at baseline.   Psychiatric:         Mood and Affect: Mood normal.         Assessment/Plan   Diagnoses and all orders for this visit:    1. Skin lesion (Primary)    2. Tick bite, initial encounter  -     doxycycline (VIBRAMYICN) 100 MG tablet; Take 1 tablet by mouth 2 (Two) Times a Day for 7 days.  Dispense: 14 tablet; Refill: 0  -     Tick Panel - Blood, Groin, left; Future      Upon my exam today I do not appreciate any remnants of the tick.  The area of the concern is slightly inflamed, I will be starting him on doxycycline and I will be getting tick panel.  He was advised to monitor the area and call us back if any questions or concerns.          Return if symptoms worsen or fail to improve, for Recheck.    Requested Prescriptions     Signed Prescriptions Disp Refills   • doxycycline (VIBRAMYICN)  100 MG tablet 14 tablet 0     Sig: Take 1 tablet by mouth 2 (Two) Times a Day for 7 days.

## 2021-04-09 LAB — B BURGDOR IGG SER QL: NEGATIVE

## 2021-04-13 LAB
A PHAGOCYTOPH IGG TITR SER IF: NEGATIVE {TITER}
A PHAGOCYTOPH IGM TITR SER IF: NEGATIVE {TITER}
BRUCELLA IGG SER QL IA: NEGATIVE
BRUCELLA IGM SER QL IA: NEGATIVE
E CHAFFEENSIS IGG TITR SER IF: NEGATIVE {TITER}
E CHAFFEENSIS IGM TITR SER IF: NEGATIVE {TITER}
RICK SF IGG TITR SER IF: NORMAL {TITER}
RICK SF IGM TITR SER IF: NORMAL {TITER}
RICK TYPHUS IGG TITR SER IF: NORMAL {TITER}
RICK TYPHUS IGM TITR SER IF: NORMAL {TITER}

## 2021-04-17 ENCOUNTER — HOSPITAL ENCOUNTER (EMERGENCY)
Facility: HOSPITAL | Age: 36
Discharge: HOME | End: 2021-04-17
Attending: EMERGENCY MEDICINE | Admitting: EMERGENCY MEDICINE

## 2021-04-17 ENCOUNTER — APPOINTMENT (OUTPATIENT)
Dept: GENERAL RADIOLOGY | Facility: HOSPITAL | Age: 36
End: 2021-04-17

## 2021-04-17 VITALS
HEART RATE: 84 BPM | HEIGHT: 72 IN | BODY MASS INDEX: 29.59 KG/M2 | RESPIRATION RATE: 16 BRPM | SYSTOLIC BLOOD PRESSURE: 139 MMHG | WEIGHT: 218.48 LBS | DIASTOLIC BLOOD PRESSURE: 65 MMHG | TEMPERATURE: 97.9 F | OXYGEN SATURATION: 99 %

## 2021-04-17 DIAGNOSIS — R07.89 CHEST TIGHTNESS: Primary | ICD-10-CM

## 2021-04-17 LAB
ALBUMIN SERPL-MCNC: 4.3 G/DL (ref 3.5–5.2)
ALBUMIN/GLOB SERPL: 1.4 G/DL
ALP SERPL-CCNC: 91 U/L (ref 39–117)
ALT SERPL W P-5'-P-CCNC: 40 U/L (ref 1–41)
ANION GAP SERPL CALCULATED.3IONS-SCNC: 14 MMOL/L (ref 5–15)
AST SERPL-CCNC: 29 U/L (ref 1–40)
BASOPHILS # BLD AUTO: 0.1 10*3/MM3 (ref 0–0.2)
BASOPHILS NFR BLD AUTO: 0.7 % (ref 0–1.5)
BILIRUB SERPL-MCNC: 0.6 MG/DL (ref 0–1.2)
BUN SERPL-MCNC: 18 MG/DL (ref 6–20)
BUN/CREAT SERPL: 21.2 (ref 7–25)
CALCIUM SPEC-SCNC: 9.9 MG/DL (ref 8.6–10.5)
CHLORIDE SERPL-SCNC: 99 MMOL/L (ref 98–107)
CO2 SERPL-SCNC: 25 MMOL/L (ref 22–29)
CREAT SERPL-MCNC: 0.85 MG/DL (ref 0.76–1.27)
DEPRECATED RDW RBC AUTO: 38.9 FL (ref 37–54)
EOSINOPHIL # BLD AUTO: 0.3 10*3/MM3 (ref 0–0.4)
EOSINOPHIL NFR BLD AUTO: 3.9 % (ref 0.3–6.2)
ERYTHROCYTE [DISTWIDTH] IN BLOOD BY AUTOMATED COUNT: 12.5 % (ref 12.3–15.4)
GFR SERPL CREATININE-BSD FRML MDRD: 101 ML/MIN/1.73
GLOBULIN UR ELPH-MCNC: 3 GM/DL
GLUCOSE SERPL-MCNC: 96 MG/DL (ref 65–99)
HCT VFR BLD AUTO: 43.1 % (ref 37.5–51)
HGB BLD-MCNC: 15.4 G/DL (ref 13–17.7)
HOLD SPECIMEN: NORMAL
LIPASE SERPL-CCNC: 39 U/L (ref 13–60)
LYMPHOCYTES # BLD AUTO: 2.6 10*3/MM3 (ref 0.7–3.1)
LYMPHOCYTES NFR BLD AUTO: 31.7 % (ref 19.6–45.3)
MAGNESIUM SERPL-MCNC: 2 MG/DL (ref 1.6–2.6)
MCH RBC QN AUTO: 31.3 PG (ref 26.6–33)
MCHC RBC AUTO-ENTMCNC: 35.7 G/DL (ref 31.5–35.7)
MCV RBC AUTO: 87.7 FL (ref 79–97)
MONOCYTES # BLD AUTO: 0.9 10*3/MM3 (ref 0.1–0.9)
MONOCYTES NFR BLD AUTO: 11.5 % (ref 5–12)
NEUTROPHILS NFR BLD AUTO: 4.3 10*3/MM3 (ref 1.7–7)
NEUTROPHILS NFR BLD AUTO: 52.2 % (ref 42.7–76)
NRBC BLD AUTO-RTO: 0.1 /100 WBC (ref 0–0.2)
PLATELET # BLD AUTO: 364 10*3/MM3 (ref 140–450)
PMV BLD AUTO: 7.4 FL (ref 6–12)
POTASSIUM SERPL-SCNC: 3.9 MMOL/L (ref 3.5–5.2)
PROT SERPL-MCNC: 7.3 G/DL (ref 6–8.5)
QT INTERVAL: 364 MS
RBC # BLD AUTO: 4.92 10*6/MM3 (ref 4.14–5.8)
SODIUM SERPL-SCNC: 138 MMOL/L (ref 136–145)
TROPONIN T SERPL-MCNC: <0.01 NG/ML (ref 0–0.03)
TSH SERPL DL<=0.05 MIU/L-ACNC: 1 UIU/ML (ref 0.27–4.2)
WBC # BLD AUTO: 8.2 10*3/MM3 (ref 3.4–10.8)
WHOLE BLOOD HOLD SPECIMEN: NORMAL

## 2021-04-17 PROCEDURE — 83735 ASSAY OF MAGNESIUM: CPT | Performed by: EMERGENCY MEDICINE

## 2021-04-17 PROCEDURE — 99284 EMERGENCY DEPT VISIT MOD MDM: CPT

## 2021-04-17 PROCEDURE — 71045 X-RAY EXAM CHEST 1 VIEW: CPT

## 2021-04-17 PROCEDURE — 84443 ASSAY THYROID STIM HORMONE: CPT | Performed by: EMERGENCY MEDICINE

## 2021-04-17 PROCEDURE — 93005 ELECTROCARDIOGRAM TRACING: CPT | Performed by: NURSE PRACTITIONER

## 2021-04-17 PROCEDURE — 80053 COMPREHEN METABOLIC PANEL: CPT | Performed by: EMERGENCY MEDICINE

## 2021-04-17 PROCEDURE — 83690 ASSAY OF LIPASE: CPT | Performed by: EMERGENCY MEDICINE

## 2021-04-17 PROCEDURE — 84484 ASSAY OF TROPONIN QUANT: CPT | Performed by: EMERGENCY MEDICINE

## 2021-04-17 PROCEDURE — 85025 COMPLETE CBC W/AUTO DIFF WBC: CPT | Performed by: EMERGENCY MEDICINE

## 2021-04-17 RX ORDER — SODIUM CHLORIDE 0.9 % (FLUSH) 0.9 %
10 SYRINGE (ML) INJECTION AS NEEDED
Status: DISCONTINUED | OUTPATIENT
Start: 2021-04-17 | End: 2021-04-17 | Stop reason: HOSPADM

## 2021-04-17 NOTE — DISCHARGE INSTRUCTIONS
Rest plenty of fluids avoid the cutting IV materials you were cutting.  Return for fever coughing up blood shortness of breath dizziness passing out increasing pain neck arm jaw pain change mind about staying or any other new or worse problems or concerns.  Follow-up as directed above.

## 2021-04-20 ENCOUNTER — OFFICE VISIT (OUTPATIENT)
Dept: FAMILY MEDICINE CLINIC | Facility: CLINIC | Age: 36
End: 2021-04-20

## 2021-04-20 VITALS
OXYGEN SATURATION: 97 % | HEART RATE: 84 BPM | TEMPERATURE: 97.5 F | BODY MASS INDEX: 29.96 KG/M2 | WEIGHT: 221.2 LBS | HEIGHT: 72 IN | DIASTOLIC BLOOD PRESSURE: 74 MMHG | SYSTOLIC BLOOD PRESSURE: 109 MMHG

## 2021-04-20 DIAGNOSIS — K21.9 GASTROESOPHAGEAL REFLUX DISEASE, UNSPECIFIED WHETHER ESOPHAGITIS PRESENT: ICD-10-CM

## 2021-04-20 DIAGNOSIS — I10 ESSENTIAL HYPERTENSION: Primary | ICD-10-CM

## 2021-04-20 DIAGNOSIS — M54.50 ACUTE MIDLINE LOW BACK PAIN WITHOUT SCIATICA: ICD-10-CM

## 2021-04-20 DIAGNOSIS — R53.83 FATIGUE, UNSPECIFIED TYPE: ICD-10-CM

## 2021-04-20 PROCEDURE — 99214 OFFICE O/P EST MOD 30 MIN: CPT | Performed by: FAMILY MEDICINE

## 2021-04-22 ENCOUNTER — OFFICE VISIT (OUTPATIENT)
Dept: CARDIOLOGY | Facility: CLINIC | Age: 36
End: 2021-04-22

## 2021-04-22 VITALS
WEIGHT: 220 LBS | DIASTOLIC BLOOD PRESSURE: 76 MMHG | HEIGHT: 72 IN | SYSTOLIC BLOOD PRESSURE: 112 MMHG | HEART RATE: 71 BPM | OXYGEN SATURATION: 100 % | TEMPERATURE: 96.6 F | BODY MASS INDEX: 29.8 KG/M2

## 2021-04-22 DIAGNOSIS — R00.0 TACHYCARDIA: ICD-10-CM

## 2021-04-22 DIAGNOSIS — I10 ESSENTIAL HYPERTENSION: Primary | ICD-10-CM

## 2021-04-22 PROCEDURE — 99213 OFFICE O/P EST LOW 20 MIN: CPT | Performed by: INTERNAL MEDICINE

## 2021-04-22 NOTE — PROGRESS NOTES
Subjective:     Encounter Date:04/22/2021      Patient ID: Davian Arevalo is a 35 y.o. male.    Chief Complaint:  History of Present Illness 35-year-old white male with history of anxiety disorder history of hypertension and other medical problems presents to my office for follow-up.  Patient was in the hospital recently with complaints of chest pain and was ruled out for MI and then discharged home.  Since then he is not having any symptoms at this time.  Used to have palpitation with tachycardia and is currently stable on beta-blockers.  His blood pressure is also very well controlled.  No complains of any shortness of breath.  No PND orthopnea.  No dizziness syncope or swelling of the feet.    The following portions of the patient's history were reviewed and updated as appropriate: allergies, current medications, past family history, past medical history, past social history, past surgical history and problem list.  Past Medical History:   Diagnosis Date   • Abdominal pain, chronic, right lower quadrant 01/30/2015   • Anxiety    • Autism 08/13/2014    [facility] ( [facility]   • Carpal tunnel syndrome 02/22/2018   • Constipation 09/21/2018   • Depression    • Dyslexia 12/26/2017   • Hypertension    • IBS (irritable bowel syndrome) 09/21/2018   • IBS (irritable bowel syndrome)    • Knee pain, left 07/25/2017   • Lower back pain 01/30/2015   • Right cervical radiculopathy 01/07/2019   • Shoulder pain, right 02/14/2017   • Tinnitus    • Vitamin D deficiency 12/26/2017     Past Surgical History:   Procedure Laterality Date   • ADENOIDECTOMY     • COLONOSCOPY     • CYSTOSCOPY, URETEROSCOPY, RETROGRADE PYELOGRAM, STENT INSERTION Right 12/15/2020    Procedure: CYSTOSCOPY URETEROSCOPY STONE BASKET EXTRACTION AND STENT INSERTION;  Surgeon: Gerry Giron MD;  Location: [facility] MAIN OR;  Service: Urology;  Laterality: Right;   • TONSILLECTOMY  1993    Tonsillectomy and Adenoids-   • TONSILLECTOMY AND  "ADENOIDECTOMY       /76 (BP Location: Left arm, Patient Position: Sitting)   Pulse 71   Temp 96.6 °F (35.9 °C)   Ht 182.9 cm (72\")   Wt 99.8 kg (220 lb)   SpO2 100%   BMI 29.84 kg/m²   Family History   Problem Relation Age of Onset   • Cancer Mother    • Cancer Father    • Hypertension Father    • Stroke Father    • Hyperlipidemia Father    • Hypertension Mother    • Atrial fibrillation Mother    • Diabetes Paternal Uncle    • Diabetes Maternal Grandmother    • Stroke Maternal Grandfather    • Diabetes Maternal Grandfather    • Stroke Other        Current Outpatient Medications:   •  atenolol (TENORMIN) 25 MG tablet, TAKE ONE TABLET BY MOUTH ONCE NIGHTLY, Disp: 90 tablet, Rfl: 2  •  cholecalciferol (VITAMIN D3) 1000 units tablet, Take 1,000 Units by mouth Daily., Disp: , Rfl:   •  dicyclomine (BENTYL) 20 MG tablet, Take 20 mg by mouth 3 (Three) Times a Day., Disp: , Rfl:   •  folic acid-vit B6-vit B12 (FOLBEE) 2.5-25-1 MG tablet tablet, Take  by mouth Daily., Disp: , Rfl:   •  lisinopril (PRINIVIL,ZESTRIL) 20 MG tablet, TAKE ONE TABLET BY MOUTH DAILY, Disp: 90 tablet, Rfl: 3  •  nortriptyline (PAMELOR) 50 MG capsule, Take 100 mg by mouth Every Night., Disp: , Rfl:   •  vitamin B-12 (CYANOCOBALAMIN) 1000 MCG tablet, Take 1,000 mcg by mouth Daily., Disp: , Rfl:   Allergies   Allergen Reactions   • Ceclor [Cefaclor] Hives   • Amoxicillin Hives   • Cephalexin Hives     Tolerated CTX 12/2020 admission   • Cloral Hives     Social History     Socioeconomic History   • Marital status: Single     Spouse name: Not on file   • Number of children: Not on file   • Years of education: Not on file   • Highest education level: Not on file   Tobacco Use   • Smoking status: Never Smoker   • Smokeless tobacco: Never Used   Substance and Sexual Activity   • Alcohol use: Never   • Drug use: No     Review of Systems   Constitutional: Positive for malaise/fatigue. Negative for fever.   Cardiovascular: Negative for chest pain, " dyspnea on exertion and palpitations.   Respiratory: Negative for cough and shortness of breath.    Skin: Negative for rash.   Gastrointestinal: Negative for abdominal pain, nausea and vomiting.   Neurological: Negative for focal weakness and headaches.   All other systems reviewed and are negative.             Objective:     Constitutional:       Appearance: Well-developed.   Eyes:      General: No scleral icterus.     Conjunctiva/sclera: Conjunctivae normal.   HENT:      Head: Normocephalic and atraumatic.   Neck:      Vascular: No carotid bruit or JVD.   Pulmonary:      Effort: Pulmonary effort is normal.      Breath sounds: Normal breath sounds. No wheezing. No rales.   Cardiovascular:      Normal rate. Regular rhythm.   Pulses:     Intact distal pulses.   Abdominal:      General: Bowel sounds are normal.      Palpations: Abdomen is soft.   Musculoskeletal:      Cervical back: Normal range of motion and neck supple. Skin:     General: Skin is warm and dry.      Findings: No rash.   Neurological:      Mental Status: Alert.       Procedures    Lab Review:         MDM  1.  Palpitations tachycardia and chest pain  Patient had PVCs and is currently on beta-blockers and stable  Patient also had an echocardiogram which showed normal LV systolic function  Patient had a treadmill test which was normal  2.  Hypertension excellent patient blood pressure currently stable on medications.

## 2021-05-03 ENCOUNTER — TELEPHONE (OUTPATIENT)
Dept: FAMILY MEDICINE CLINIC | Facility: CLINIC | Age: 36
End: 2021-05-03

## 2021-05-03 NOTE — TELEPHONE ENCOUNTER
----- Message from Merlene Zamorano MD sent at 4/30/2021 12:43 PM EDT -----      ----- Message -----  From: Columba Caldera RegSched Rep  Sent: 4/30/2021  10:38 AM EDT  To: Merlene Zamorano MD    Please let his mother know that I received her letter.  Davian has been to the ER and this office and the cardiologist's office recently.  All of his tests including EKGs, echocardiogram and stress test are normal.  No other tests are indicated unless he has new symptoms.

## 2021-05-13 ENCOUNTER — OFFICE (OUTPATIENT)
Dept: URBAN - METROPOLITAN AREA CLINIC 64 | Facility: CLINIC | Age: 36
End: 2021-05-13
Payer: MEDICAID

## 2021-05-13 VITALS
DIASTOLIC BLOOD PRESSURE: 78 MMHG | SYSTOLIC BLOOD PRESSURE: 107 MMHG | WEIGHT: 224 LBS | HEIGHT: 72 IN | HEART RATE: 89 BPM

## 2021-05-13 DIAGNOSIS — K21.9 GASTRO-ESOPHAGEAL REFLUX DISEASE WITHOUT ESOPHAGITIS: ICD-10-CM

## 2021-05-13 DIAGNOSIS — K58.2 MIXED IRRITABLE BOWEL SYNDROME: ICD-10-CM

## 2021-05-13 PROCEDURE — 99213 OFFICE O/P EST LOW 20 MIN: CPT | Performed by: INTERNAL MEDICINE

## 2021-05-13 RX ORDER — ESOMEPRAZOLE MAGNESIUM 20 MG/1
20 CAPSULE, DELAYED RELEASE ORAL
Qty: 90 | Refills: 3 | Status: COMPLETED
Start: 2021-05-13 | End: 2022-08-26

## 2021-05-13 RX ORDER — DICYCLOMINE HYDROCHLORIDE 20 MG/1
40 TABLET ORAL
Qty: 180 | Refills: 3 | Status: ACTIVE

## 2021-07-21 ENCOUNTER — OFFICE VISIT (OUTPATIENT)
Dept: FAMILY MEDICINE CLINIC | Facility: CLINIC | Age: 36
End: 2021-07-21

## 2021-07-21 VITALS
BODY MASS INDEX: 30.38 KG/M2 | SYSTOLIC BLOOD PRESSURE: 120 MMHG | DIASTOLIC BLOOD PRESSURE: 84 MMHG | TEMPERATURE: 97.3 F | HEART RATE: 81 BPM | OXYGEN SATURATION: 97 % | WEIGHT: 224 LBS

## 2021-07-21 DIAGNOSIS — M54.50 ACUTE BILATERAL LOW BACK PAIN WITHOUT SCIATICA: Primary | ICD-10-CM

## 2021-07-21 PROCEDURE — 99213 OFFICE O/P EST LOW 20 MIN: CPT | Performed by: FAMILY MEDICINE

## 2021-07-21 RX ORDER — BACLOFEN 10 MG/1
10 TABLET ORAL 3 TIMES DAILY
Qty: 30 TABLET | Refills: 0 | Status: SHIPPED | OUTPATIENT
Start: 2021-07-21 | End: 2021-11-23

## 2021-07-29 DIAGNOSIS — M54.50 ACUTE BILATERAL LOW BACK PAIN WITHOUT SCIATICA: Primary | ICD-10-CM

## 2021-08-11 ENCOUNTER — TREATMENT (OUTPATIENT)
Dept: PHYSICAL THERAPY | Facility: CLINIC | Age: 36
End: 2021-08-11

## 2021-08-11 DIAGNOSIS — M54.50 ACUTE BILATERAL LOW BACK PAIN WITHOUT SCIATICA: Primary | ICD-10-CM

## 2021-08-11 PROCEDURE — 97162 PT EVAL MOD COMPLEX 30 MIN: CPT | Performed by: PHYSICAL THERAPIST

## 2021-08-11 NOTE — PROGRESS NOTES
Physical Therapy Initial Evaluation and Plan of Care    Patient: Davian Arevalo   : 1985  Diagnosis/ICD-10 Code:  Acute bilateral low back pain without sciatica [M54.5]  Referring practitioner: Merlene Zamorano MD  Date of Initial Visit: 2021  Today's Date: 2021  Patient seen for 1 sessions           Subjective Questionnaire: GERALDO: 24%; NDI n/a this visit.      Subjective Evaluation    History of Present Illness  Onset date: ~1 month LB; neck yrs.  Mechanism of injury: 36 y/o male with lower back (picked up a tree limb) and neck pain (off and on - has herniated disc in neck) and previous treatment.     C/o pain in lower back in center: was going down legs, but stopped. Neck pain at base of cervicothoracic junction in center: occasional nausea due to intensity of pain. No numbness or tingling reported in UE's or LE's.      Patient Occupation: Disability - stomach and autism and dyslexia Quality of life: fair    Pain  Current pain ratin  At best pain ratin  At worst pain ratin  Quality: discomfort and dull ache  Relieving factors: medications (laying flat feels better on neck and back.)  Aggravating factors: lifting and standing  Progression: improved    Social Support  Lives in: one-story house  Lives with: alone    Hand dominance: ambidextrous    Diagnostic Tests  X-ray: abnormal    Treatments  Previous treatment: physical therapy ([facility] for neck )  Current treatment: medication  Patient Goals  Patient goals for therapy: decreased pain and increased motion  Patient goal: be able to weed eat around yard; bring in groceries.           Objective          Tenderness     Additional Tenderness Details  Tender to palpation C5-6 and C6-7  L3-4 and L4-5 with overpressure.    Neurological Testing     Sensation   Cervical/Thoracic   Left   Intact: light touch    Right   Intact: light touch    Lumbar   Left   Intact: light touch    Right   Intact: light touch    Reflexes   Left   Biceps  (C5/C6): normal (2+)  Brachioradialis (C6): normal (2+)  Triceps (C7): normal (2+)  Patellar (L4): normal (2+)  Achilles (S1): normal (2+)    Right   Biceps (C5/C6): normal (2+)  Brachioradialis (C6): normal (2+)  Triceps (C7): normal (2+)  Patellar (L4): normal (2+)  Achilles (S1): normal (2+)    Active Range of Motion   Cervical/Thoracic Spine   Cervical  Subcranial protraction: WFL   Subcranial retraction: restricted and with pain   Flexion: WFL  Extension: Neck active extension: min. with pain  Left rotation: Neck active rotation left: min to mod. with pain  Right rotation: Neck active rotation right: miin      Lumbar   Flexion: Active lumbar flexion: mod. with pain  Extension: Active lumbar extension: min. with pain  Left rotation: Active left lumbar rotation: min to mod. with pain  Right rotation: Active right lumbar rotation: min to mod. with pain    Additional Active Range of Motion Details  Difficult to assess movement preference: patient response ambiguous.Patient with less pain with LE;s extended in supine position.     Strength/Myotome Testing     Additional Strength Details  B UE/ LE's 5/5 in major planes; weak core musculature.    Tests     Additional Tests Details  Cervical compression: increases pain  Cervical distraction: decreases pain  HL position: distraction reduced pain.          Assessment & Plan     Assessment  Impairments: abnormal muscle firing, abnormal muscle tone, abnormal or restricted ROM, activity intolerance, impaired physical strength, lacks appropriate home exercise program and pain with function  Assessment details: Pt presents to PT with neck and LBP: impairments affect ADL's and IADL's - I.e. shopping; lawn care.  Pt is appropriate for the skilled PT interventions to address the deficits noted above; has the potential to benefit from PT. Will be seen until patient plateaus, goals met; or discharged per MD.  Prognosis: good  Functional Limitations: lifting, sleeping, walking,  pulling, uncomfortable because of pain, sitting, standing, stooping and unable to perform repetitive tasks  Goals  Plan Goals: SHORT TERM GOALS: Time for Goal Achievement: 4 weeks    1.  Patient to be compliant w/ the HEP and tolerate progression of exercise.             2.  Cervical and lumbar Pain level < 5/10 at worst with mentioned activities to improve function.  3.  Increased lumbar AROM to by 25% in all planes to allow for increased ease with sit-stand transfers and functional activities.  4. Less cervical and lumbar pain with shopping, ADL's and IADL's .25%.    LONG TERM GOALS: Time for Goal Achievement: D/C  1.  Outcome survey to show significant improvement  2.  Pain level <2/10 with all listed activities to return to normal.  3.  Cervical and Lumbar AROM to WFL to allow for return to household, work & recreational activities w/o increase in symptoms.  4.  (B) LE and lower abdominal strength to 5/5 to allow for pushing, pulling and activities to occur without pain (driving, sitting, household  & Job requirements).        Plan  Therapy options: will be seen for skilled physical therapy services  Planned modality interventions: electrical stimulation/Russian stimulation, cryotherapy, TENS, traction and ultrasound  Other planned modality interventions: dry needling  Planned therapy interventions: manual therapy, abdominal trunk stabilization, ADL retraining, neuromuscular re-education, spinal/joint mobilization, soft tissue mobilization, strengthening, stretching, therapeutic activities, body mechanics training, postural training, home exercise program, joint mobilization, gait training, functional ROM exercises and flexibility  Frequency: 2x week  Duration in visits: 12  Treatment plan discussed with: patient        History # of Personal Factors and/or Comorbidities: MODERATE (1-2)  Examination of Body System(s): # of elements: MODERATE (3)  Clinical Presentation: EVOLVING  Clinical Decision Making:  MODERATE      Timed:         Manual Therapy:         mins  01605;     Therapeutic Exercise:    10     mins  39038;     Neuromuscular Madina:        mins  07369;    Therapeutic Activity:         mins  46349;     Gait Training:           mins  14949;     Ultrasound:          mins  62901;    Ionto                                   mins   38832  Self Care                            mins   00810  Aquatic                               mins 42052      Un-Timed:  Electrical Stimulation:    15     mins  32437 ( );  Dry Needling         mins self-pay  Traction          mins 43073  Low Eval          Mins  85445  Mod Eval     35     Mins  67428  High Eval                           Mins  48635  Re-Eval                              mins  91463        Timed Treatment:  15    mins   Total Treatment:     60   mins    PT SIGNATURE: Yemi Truong PT   DATE TREATMENT INITIATED: 8/11/2021    90 Day Recertification  Certification Period: 11/9/2021  I certify that the therapy services are furnished while this patient is under my care.  The services outlined above are required by this patient, and will be reviewed every 90 days.     PHYSICIAN: Merlene Zamorano MD      DATE:     Please sign and return via fax to [phone].. Thank you, [facility].

## 2021-08-18 ENCOUNTER — TREATMENT (OUTPATIENT)
Dept: PHYSICAL THERAPY | Facility: CLINIC | Age: 36
End: 2021-08-18

## 2021-08-18 DIAGNOSIS — M54.50 ACUTE BILATERAL LOW BACK PAIN WITHOUT SCIATICA: Primary | ICD-10-CM

## 2021-08-18 PROCEDURE — 97110 THERAPEUTIC EXERCISES: CPT | Performed by: PHYSICAL THERAPIST

## 2021-08-18 PROCEDURE — 97014 ELECTRIC STIMULATION THERAPY: CPT | Performed by: PHYSICAL THERAPIST

## 2021-08-18 PROCEDURE — 97140 MANUAL THERAPY 1/> REGIONS: CPT | Performed by: PHYSICAL THERAPIST

## 2021-08-18 NOTE — PROGRESS NOTES
Physical Therapy Daily Progress Note     Diagnosis Plan   1. Acute bilateral low back pain without sciatica         VISIT#: 2    Subjective   Davian Arevalo reports: neck hurting more than LB today. Sits in front of TV and uses for monitor - sits on side of bed. ETiM helped.       Objective     See Exercise, Manual, and Modality Logs for complete treatment.     Patient Education: reviewed posture and HEP.  TM x 6 min x 2.0 mph prior to exercises - verbal cues for erect posture.     Assessment/Plan - patient with decrease in cervical pain with manual, but difficult to assess status of LB. Decreased pain post-IFC.      Progress per Plan of Care            Timed:         Manual Therapy:    15     mins  96154;     Therapeutic Exercise:    26     mins  65505;     Neuromuscular Madina:        mins  26702;    Therapeutic Activity:          mins  98781;     Gait Training:           mins  63559;     Ultrasound:          mins  76353;    Ionto                                   mins   56306  Self Care                            mins   61143  Canalith Repos                  mins  4209  Aquatic                               mins 16113    Un-Timed:  Electrical Stimulation:   15      mins  12169 ( );  Dry Needling          mins self-pay  Traction         mins 58079  Low Eval          Mins  72382  Mod Eval         Mins  72285  High Eval                            Mins  07582  Re-Eval                               mins  08549    Timed Treatment:   41   mins   Total Treatment:     56   mins    Yemi Truong, PT PT, DPT, [id number]

## 2021-08-25 ENCOUNTER — TREATMENT (OUTPATIENT)
Dept: PHYSICAL THERAPY | Facility: CLINIC | Age: 36
End: 2021-08-25

## 2021-08-25 DIAGNOSIS — M54.50 ACUTE BILATERAL LOW BACK PAIN WITHOUT SCIATICA: Primary | ICD-10-CM

## 2021-08-25 PROCEDURE — 97110 THERAPEUTIC EXERCISES: CPT | Performed by: PHYSICAL THERAPIST

## 2021-08-25 PROCEDURE — 97014 ELECTRIC STIMULATION THERAPY: CPT | Performed by: PHYSICAL THERAPIST

## 2021-08-25 PROCEDURE — 97140 MANUAL THERAPY 1/> REGIONS: CPT | Performed by: PHYSICAL THERAPIST

## 2021-08-25 NOTE — PROGRESS NOTES
Physical Therapy Daily Progress Note     Diagnosis Plan   1. Acute bilateral low back pain without sciatica         VISIT#: 3    Subjective   Davian Arevalo reports: back hurting more today than neck; not performing HEP: busy and forgot.    Objective     See Exercise, Manual, and Modality Logs for complete treatment.     Patient Education: reviewed posture and HEP:    Access Code: [email or web address]  URL: [email or web address]  Date: 08/25/2021  Prepared by: Gilbert Truong    Exercises  Hooklying Single Knee to Chest - 1 x daily - 7 x weekly - 1 sets - 10 reps - 3 sec hold hold  90/90 Lower Trunk Rotation - 1 x daily - 7 x weekly - 1 sets - 10 reps  Mid-Lower Cervical Extension SNAG with Strap - 1 x daily - 7 x weekly - 1 sets - 10 reps  Supine Piriformis Stretch with Foot on Ground - 1 x daily - 7 x weekly - 1 sets - 3 reps - 20 sec hold  Supine Hamstring Stretch - 1 x daily - 7 x weekly - 1 sets - 3 reps - 20 sec hold    TM x 10 min x 2.0 mph prior to exercises - verbal cues for erect posture.     Assessment/Plan - used IFC on lumbar spine ost-therapy for pain control; given handouts for HEP to assist with consistency.     Progress per Plan of Care            Timed:         Manual Therapy:    10     mins  12024;     Therapeutic Exercise:    30     mins  70134;     Neuromuscular Madina:        mins  38490;    Therapeutic Activity:          mins  21457;     Gait Training:           mins  53864;     Ultrasound:          mins  16078;    Ionto                                   mins   03143  Self Care                            mins   31338  Canalith Repos                  mins  4209  Aquatic                               mins 13853    Un-Timed:  Electrical Stimulation:   15      mins  31571 ( );  Dry Needling          mins self-pay  Traction         mins 36172  Low Eval          Mins  43664  Mod Eval         Mins  32603  High Eval                            Mins  62251  Re-Eval                               mins   05361    Timed Treatment:   40   mins   Total Treatment:     55   mins    Yemi Truong, PT PT, DPT, [id number]

## 2021-10-14 ENCOUNTER — TREATMENT (OUTPATIENT)
Dept: PHYSICAL THERAPY | Facility: CLINIC | Age: 36
End: 2021-10-14

## 2021-10-14 DIAGNOSIS — M54.50 ACUTE BILATERAL LOW BACK PAIN WITHOUT SCIATICA: Primary | ICD-10-CM

## 2021-10-14 PROCEDURE — 97140 MANUAL THERAPY 1/> REGIONS: CPT | Performed by: PHYSICAL THERAPIST

## 2021-10-14 PROCEDURE — 97014 ELECTRIC STIMULATION THERAPY: CPT | Performed by: PHYSICAL THERAPIST

## 2021-10-14 PROCEDURE — 97110 THERAPEUTIC EXERCISES: CPT | Performed by: PHYSICAL THERAPIST

## 2021-10-14 NOTE — PROGRESS NOTES
Physical Therapy Daily Progress Note     Diagnosis Plan   1. Acute bilateral low back pain without sciatica         VISIT#: 4    Subjective   Davian Arevalo reports: lifting heavy stuff and irritated back: back better with with posture correction; neck hurting a little. Has not been to PT: waiting on insurance approval. Status slightly better in both neck and LB w/ HEP.        Objective     See Exercise, Manual, and Modality Logs for complete treatment.     Patient Education: reviewed posture and HEP: Access Code: [email or web address].      Reviewed HEP and will send progress note to MD due to length of time b/w treatments per medicaid and medicare guidelines.   Objective: grossly same as on exam - less pain in both cervical and lumbar spine wit polpation.     Assessment/Plan -  Will continue current POC - have been wiating on inusrance auth. Seems to be responding to PT. STG's met or progressing. Instructed in TA contraction.    IFC for pain in neck/scapulae      Goals  Plan Goals: SHORT TERM GOALS: Time for Goal Achievement: 4 weeks    1.  Patient to be compliant w/ the HEP and tolerate progression of exercise.  MET        2.  Cervical and lumbar Pain level < 5/10 at worst with mentioned activities to improve function. 3/10 Met  3.  Increased lumbar AROM to by 25% in all planes to allow for increased ease with sit-stand transfers and functional activities. Progressing.  4. Less cervical and lumbar pain with shopping, ADL's and IADL's .Met    Progress per Plan of Care - continue current POC.             Timed:         Manual Therapy:    10     mins  25136;     Therapeutic Exercise:    35     mins  64566;     Neuromuscular Madina:        mins  06322;    Therapeutic Activity:          mins  65891;     Gait Training:           mins  50766;     Ultrasound:          mins  72664;    Ionto                                   mins   88873  Self Care                            mins   47934  [facility]                  mins   4209  Aquatic                               mins 73510    Un-Timed:  Electrical Stimulation:   10     mins  29213 ( );  Dry Needling          mins self-pay  Traction         mins 85825  Low Eval          Mins  04581  Mod Eval         Mins  89037  High Eval                            Mins  21837  Re-Eval                               mins  03101    Timed Treatment:   45   mins   Total Treatment:     55   mins    Yemi Truong, PT PT, DPT, [id number]

## 2021-10-18 ENCOUNTER — TELEPHONE (OUTPATIENT)
Dept: FAMILY MEDICINE CLINIC | Facility: CLINIC | Age: 36
End: 2021-10-18

## 2021-10-18 NOTE — TELEPHONE ENCOUNTER
PATIENTS MOM IS NEEDING A CALL BACK ABOUT HER AUTISTIC SON GETTING THE 3RD COVID VACCINE. THEY SAID THEY WOULD NEED A NOTE FROM THE DOCTOR.     [phone]

## 2021-10-18 NOTE — TELEPHONE ENCOUNTER
She is not on his HIPAA form from 08/2021 so I cannot talk to her.  I will talk with the patient the next time I see him.

## 2021-10-20 ENCOUNTER — TREATMENT (OUTPATIENT)
Dept: PHYSICAL THERAPY | Facility: CLINIC | Age: 36
End: 2021-10-20

## 2021-10-20 DIAGNOSIS — M54.50 ACUTE BILATERAL LOW BACK PAIN WITHOUT SCIATICA: Primary | ICD-10-CM

## 2021-10-20 PROCEDURE — 97140 MANUAL THERAPY 1/> REGIONS: CPT | Performed by: PHYSICAL THERAPIST

## 2021-10-20 PROCEDURE — 97110 THERAPEUTIC EXERCISES: CPT | Performed by: PHYSICAL THERAPIST

## 2021-10-20 NOTE — PROGRESS NOTES
Physical Therapy Daily Progress Note     Diagnosis Plan   1. Acute bilateral low back pain without sciatica         VISIT#: 5    Subjective   Davian Arevalo reports: pain less frequent in neck and LB; has floor bike for home but has not been using.      Objective     See Exercise, Manual, and Modality Logs for complete treatment.     Patient Education: advised to begin cycling at home x 5 min daily.   Access Code: [email or web address].      Assessment/Plan -  Good tolerance to interventions and therapy. Requires concise verbiage for directions and HEP instruction.      Goals  Plan Goals: SHORT TERM GOALS: Time for Goal Achievement: 4 weeks    1.  Patient to be compliant w/ the HEP and tolerate progression of exercise.  MET        2.  Cervical and lumbar Pain level < 5/10 at worst with mentioned activities to improve function. 3/10 Met  3.  Increased lumbar AROM to by 25% in all planes to allow for increased ease with sit-stand transfers and functional activities. Progressing.  4. Less cervical and lumbar pain with shopping, ADL's and IADL's .Met    Progress per Plan of Care - continue current POC.             Timed:         Manual Therapy:    10     mins  84151;     Therapeutic Exercise:    35     mins  16132;     Neuromuscular Madina:        mins  90733;    Therapeutic Activity:          mins  44430;     Gait Training:           mins  12502;     Ultrasound:          mins  49024;    Ionto                                   mins   30881  Self Care                            mins   17999  Canalith Repos                  mins  4209  Aquatic                               mins 71700    Un-Timed:  Electrical Stimulation:   10     mins  26731 ( );  Dry Needling          mins self-pay  Traction         mins 02034  Low Eval          Mins  66863  Mod Eval         Mins  28268  High Eval                            Mins  42126  Re-Eval                               mins  91955    Timed Treatment:   45   mins   Total Treatment:     55    amaury Truong, PT PT, DPT, [id number]

## 2021-10-22 ENCOUNTER — TREATMENT (OUTPATIENT)
Dept: PHYSICAL THERAPY | Facility: CLINIC | Age: 36
End: 2021-10-22

## 2021-10-22 DIAGNOSIS — M54.50 ACUTE BILATERAL LOW BACK PAIN WITHOUT SCIATICA: Primary | ICD-10-CM

## 2021-10-22 PROCEDURE — 97110 THERAPEUTIC EXERCISES: CPT | Performed by: PHYSICAL THERAPIST

## 2021-10-22 PROCEDURE — 97140 MANUAL THERAPY 1/> REGIONS: CPT | Performed by: PHYSICAL THERAPIST

## 2021-10-25 ENCOUNTER — TREATMENT (OUTPATIENT)
Dept: PHYSICAL THERAPY | Facility: CLINIC | Age: 36
End: 2021-10-25

## 2021-10-25 DIAGNOSIS — M54.50 ACUTE BILATERAL LOW BACK PAIN WITHOUT SCIATICA: Primary | ICD-10-CM

## 2021-10-25 PROCEDURE — 97014 ELECTRIC STIMULATION THERAPY: CPT | Performed by: PHYSICAL THERAPIST

## 2021-10-25 PROCEDURE — 97140 MANUAL THERAPY 1/> REGIONS: CPT | Performed by: PHYSICAL THERAPIST

## 2021-10-25 PROCEDURE — 97110 THERAPEUTIC EXERCISES: CPT | Performed by: PHYSICAL THERAPIST

## 2021-10-25 NOTE — PROGRESS NOTES
Physical Therapy Daily Progress Note     Diagnosis Plan   1. Acute bilateral low back pain without sciatica         VISIT#: 7    Subjective   Davian Arevalo reports: Helping LBP, but neck getting worse. Thinks he needs a new pillow to support neck. Has herniated disc in neck: heat feels better; sleeping worse. Elbow on table stretch for neck irritates.    LB: 35% better  Neck: worse to no change    Objective     See Exercise, Manual, and Modality Logs for complete treatment.   TM x 10 min at 1.8 mph    AROM:     Patient Education: reviewed HEP for neck and lower back: discontinued cervical stretch with elbow on table.    Access Code: [email or web address]  URL: [email or web address]  Date: 10/25/2021  Prepared by: Gilbert Truong    Exercises  Hooklying Single Knee to Chest - 1 x daily - 7 x weekly - 1 sets - 10 reps - 3 sec hold hold  90/90 Lower Trunk Rotation - 1 x daily - 7 x weekly - 1 sets - 10 reps  Supine Piriformis Stretch with Foot on Ground - 1 x daily - 7 x weekly - 1 sets - 3 reps - 20 sec hold  Supine Hamstring Stretch - 1 x daily - 7 x weekly - 1 sets - 3 reps - 20 sec hold  Seated Cervical Retraction - 1 x daily - 7 x weekly - 3 sets - 10 reps  Seated Upper Thoracic Stretch - 1 x daily - 7 x weekly - 1 sets - 10 reps  Sternocleidomastoid Stretch - 1 x daily - 7 x weekly - 1 sets - 10 reps - 10 sec hold  Hooklying Small March - 1 x daily - 7 x weekly - 2 sets - 10 reps        Assessment/Plan - amended and reviewed HEP: lumbar spine improving, cervical spine still painful - advised to change pillow for improved support. initited upper thoracic and lower cervical PA mobs. Due to intensity of painin cervical spine used IFC for pain control and to relax musculature.     Goals  Plan Goals: SHORT TERM GOALS: Time for Goal Achievement: 4 weeks    1.  Patient to be compliant w/ the HEP and tolerate progression of exercise.  MET        2.  Cervical and lumbar Pain level < 5/10 at worst with mentioned activities to  improve function. 3/10 Met  3.  Increased lumbar AROM to by 25% in all planes to allow for increased ease with sit-stand transfers and functional activities. Progressing.  4. Less cervical and lumbar pain with shopping, ADL's and IADL's .Met    Progress per Plan of Care - update HEP            Timed:         Manual Therapy:    15     mins  72907;     Therapeutic Exercise:    30     mins  53926;     Neuromuscular Madina:       mins  12135;    Therapeutic Activity:          mins  33060;     Gait Training:           mins  97721;     Ultrasound:          mins  64839;    Ionto                                   mins   60016  Self Care                            mins   99311  Canalith Repos                  mins  4209  Aquatic                               mins 88118    Un-Timed:  Electrical Stimulation:    15     mins  50667 ( );  Dry Needling          mins self-pay  Traction          mins 09882  Low Eval          Mins  64593  Mod Eval          Mins  40168  High Eval                           Mins  82870  Re-Eval                               mins  35079    Timed Treatment:  45    mins   Total Treatment:    60    mins    Yemi Truong, PT PT, DPT, [id number]

## 2021-10-29 ENCOUNTER — TREATMENT (OUTPATIENT)
Dept: PHYSICAL THERAPY | Facility: CLINIC | Age: 36
End: 2021-10-29

## 2021-10-29 DIAGNOSIS — M54.50 ACUTE BILATERAL LOW BACK PAIN WITHOUT SCIATICA: Primary | ICD-10-CM

## 2021-10-29 PROCEDURE — 97140 MANUAL THERAPY 1/> REGIONS: CPT | Performed by: PHYSICAL THERAPIST

## 2021-10-29 PROCEDURE — 97110 THERAPEUTIC EXERCISES: CPT | Performed by: PHYSICAL THERAPIST

## 2021-10-29 PROCEDURE — 97014 ELECTRIC STIMULATION THERAPY: CPT | Performed by: PHYSICAL THERAPIST

## 2021-11-02 ENCOUNTER — TREATMENT (OUTPATIENT)
Dept: PHYSICAL THERAPY | Facility: CLINIC | Age: 36
End: 2021-11-02

## 2021-11-02 DIAGNOSIS — M54.50 ACUTE BILATERAL LOW BACK PAIN WITHOUT SCIATICA: Primary | ICD-10-CM

## 2021-11-02 PROCEDURE — 97110 THERAPEUTIC EXERCISES: CPT | Performed by: PHYSICAL THERAPIST

## 2021-11-02 PROCEDURE — 97140 MANUAL THERAPY 1/> REGIONS: CPT | Performed by: PHYSICAL THERAPIST

## 2021-11-02 PROCEDURE — 97014 ELECTRIC STIMULATION THERAPY: CPT | Performed by: PHYSICAL THERAPIST

## 2021-11-02 NOTE — PROGRESS NOTES
Physical Therapy Daily Progress Note     Diagnosis Plan   1. Acute bilateral low back pain without sciatica         VISIT#: 9    Subjective   Davian Arevalo reports: Tired and stomach upset: did not get to sleep at at a reasonable time. Having trouble finding pillow from store.  Back hurts a little bit  Neck a little bit today - pillow under shoulders and I think that helped.    Objective     See Exercise, Manual, and Modality Logs for complete treatment.   NuStep x 10 min x L4; set 13    Patient Education: reviewed shape of contour pillow to support neck and align in neutral position      Assessment/Plan - Focused on cervical spine: distraction reduced pain; used IFC in supine with further reduction in pain.     Goals  Plan Goals: SHORT TERM GOALS: Time for Goal Achievement: 4 weeks    1.  Patient to be compliant w/ the HEP and tolerate progression of exercise.  MET        2.  Cervical and lumbar Pain level < 5/10 at worst with mentioned activities to improve function. 3/10 Met  3.  Increased lumbar AROM to by 25% in all planes to allow for increased ease with sit-stand transfers and functional activities. Progressing.  4. Less cervical and lumbar pain with shopping, ADL's and IADL's .Met    Other - needs recert sent to MD next visit          Timed:         Manual Therapy:    10     mins  45848;     Therapeutic Exercise:    25     mins  71861;     Neuromuscular Madina:       mins  96546;    Therapeutic Activity:          mins  44060;     Gait Training:           mins  17909;     Ultrasound:          mins  61996;    Ionto                                   mins   88721  Self Care                            mins   25159  Canalith Repos                  mins  4209  Aquatic                               mins 17647    Un-Timed:  Electrical Stimulation:    15     mins  37575 ( );  Dry Needling          mins self-pay  Traction          mins 14900  Low Eval          Mins  28809  Mod Eval          Mins  67117  High  Eval                           Mins  05395  Re-Eval                               mins  51650    Timed Treatment:  35    mins   Total Treatment:    50    mins    Yemi Truong, PT PT, DPT, [id number]

## 2021-11-04 ENCOUNTER — TREATMENT (OUTPATIENT)
Dept: PHYSICAL THERAPY | Facility: CLINIC | Age: 36
End: 2021-11-04

## 2021-11-04 DIAGNOSIS — M54.50 ACUTE BILATERAL LOW BACK PAIN WITHOUT SCIATICA: Primary | ICD-10-CM

## 2021-11-04 PROCEDURE — 97110 THERAPEUTIC EXERCISES: CPT | Performed by: PHYSICAL THERAPIST

## 2021-11-04 PROCEDURE — 97140 MANUAL THERAPY 1/> REGIONS: CPT | Performed by: PHYSICAL THERAPIST

## 2021-11-04 PROCEDURE — 97014 ELECTRIC STIMULATION THERAPY: CPT | Performed by: PHYSICAL THERAPIST

## 2021-11-04 NOTE — PROGRESS NOTES
Re-Assessment / Re-Certification        Patient: Davian Arevalo   : 1985  Diagnosis/ICD-10 Code:  Acute bilateral low back pain without sciatica [M54.50]  Referring practitioner: Merlene Zamorano MD  Date of Initial Visit: Type: THERAPY  Noted: 2021  Today's Date: 2021  Patient seen for 10 sessions      Subjective:   Davian Arevalo reports: the duration of pain and intensity of pain is less: 40% better overall in neck and LB. Still experiencing pain at base of neck and center of LB. Trying new pillow with mixed results. Neck worse today.     Subjective Questionnaire:   NDI: 22%  Oswestry: 6%    Clinical Progress: improved  Home Program Compliance: Yes  Treatment has included: therapeutic exercise, neuromuscular re-education, manual therapy, therapeutic activity, electrical stimulation, ultrasound and moist heat    Subjective - see above.    Objective          Tenderness     Additional Tenderness Details  Tender to palpation C7-T1 and L upper trap  L3-4 and L4-5 with overpressure.    Active Range of Motion   Cervical/Thoracic Spine   Cervical  Subcranial protraction: WFL   Subcranial retraction: restricted   Flexion: WFL  Extension: Neck active extension: min; pain less. with pain  Left rotation: Neck active rotation left: min  with pain  Right rotation: Neck active rotation right: min      Lumbar   Flexion: Active lumbar flexion: min; pressure in calves - stretch.   Extension: Active lumbar extension: min.   Left rotation: Active left lumbar rotation: min  with pain  Right rotation: Active right lumbar rotation: min  with pain    Additional Active Range of Motion Details  Tension, no pain with rotation   Pain and ROM improved.    Strength/Myotome Testing     Additional Strength Details  B UE/ LE's 5/5 in major planes; weak core musculature.    Tests     Additional Tests Details  Cervical compression: increases pain  Cervical distraction: decreases pain  HL position: distraction reduced pain.       Assessment/Plan- patient responding well to therapy but has some difficulty quantifying and explaining pain/symtpoms; additionally, due to both neck and LB being treated simultaneously, it complicates the POC and clnical complexity of the interventions.      Progress toward previous goals: Partially Met      Recommendations: Continue with recommendations add 4 visits for a total of 16 visits  Timeframe: 3 months - certification date 2/2/22  Prognosis to achieve goals: good    PT Signature: Yemi Truong, PT DPT, [id number]     Based upon review of the patient's progress and continued therapy plan, it is my medical opinion that Davian Arevalo should continue physical therapy treatment at [facility]  [address] [place] [address] [place] [address] [phone].    Signature: __________________________________  Merlene Zamorano MD    Timed:         Manual Therapy:   10      mins  17509;     Therapeutic Exercise:     30    mins  66145;     Neuromuscular Madina:        mins  67647;    Therapeutic Activity:          mins  56917;     Gait Training:           mins  55284;     Ultrasound:          mins  91491;    Ionto                                   mins   69844  Self Care                            mins   32521  Aquatic                               mins 97878      Un-Timed:  Electrical Stimulation:    15     mins  52442 ( );  Dry Needling          mins self-pay  Traction          mins 11870  Low Eval          Mins  03942  Mod Eval          Mins  95426  High Eval                            Mins  83124  Re-Eval                               mins  92279      Timed Treatment:   40   mins   Total Treatment:     55   mins

## 2021-11-10 ENCOUNTER — TREATMENT (OUTPATIENT)
Dept: PHYSICAL THERAPY | Facility: CLINIC | Age: 36
End: 2021-11-10

## 2021-11-10 DIAGNOSIS — M54.50 ACUTE BILATERAL LOW BACK PAIN WITHOUT SCIATICA: Primary | ICD-10-CM

## 2021-11-10 PROCEDURE — 97110 THERAPEUTIC EXERCISES: CPT | Performed by: PHYSICAL THERAPIST

## 2021-11-10 PROCEDURE — 97014 ELECTRIC STIMULATION THERAPY: CPT | Performed by: PHYSICAL THERAPIST

## 2021-11-10 PROCEDURE — 97140 MANUAL THERAPY 1/> REGIONS: CPT | Performed by: PHYSICAL THERAPIST

## 2021-11-10 NOTE — PROGRESS NOTES
Physical Therapy Daily Progress Note     Diagnosis Plan   1. Acute bilateral low back pain without sciatica         VISIT#: 11    Subjective   Davian Arevalo reports: Sunday back and neck acting up; today neck uncomfortable this morning; used Tylenol which helps. Back feels a little twingey. Neck worse than LB.  Neck pillow that used at home not helpful. Lifting aggravates neck and LB. Wanted to focus on neck today.    Objective     See Exercise, Manual, and Modality Logs for complete treatment.   TM ok today: 2.0 mph  x 10 min     Patient Education: sitting, lifting and posture reviewed.    Assessment/Plan - Focused on cervical spine: distraction reduced pain; used IFC in supine with further reduction in pain.     Goals  Plan Goals: SHORT TERM GOALS: Time for Goal Achievement: 4 weeks    1.  Patient to be compliant w/ the HEP and tolerate progression of exercise.  MET        2.  Cervical and lumbar Pain level < 5/10 at worst with mentioned activities to improve function. 3/10 Met  3.  Increased lumbar AROM to by 25% in all planes to allow for increased ease with sit-stand transfers and functional activities. Progressing.  4. Less cervical and lumbar pain with shopping, ADL's and IADL's .Met    Progress per Plan of Care           Timed:         Manual Therapy:    10     mins  84006;     Therapeutic Exercise:    17     mins  75548;     Neuromuscular Madina:       mins  42606;    Therapeutic Activity:          mins  26819;     Gait Training:           mins  35863;     Ultrasound:          mins  09355;    Ionto                                   mins   22582  Self Care                            mins   29466  Canalith Repos                  mins  4209  Aquatic                               mins 25283    Un-Timed:  Electrical Stimulation:    15     mins  10363 ( );  Dry Needling          mins self-pay  Traction          mins 90377  Low Eval          Mins  74746  Mod Eval          Mins  15899  High Eval                            Mins  40684  Re-Eval                               mins  53462    Timed Treatment: 27    mins   Total Treatment:    42   mins    Yemi Truong, PT PT, DPT, [id number]

## 2021-11-12 ENCOUNTER — TREATMENT (OUTPATIENT)
Dept: PHYSICAL THERAPY | Facility: CLINIC | Age: 36
End: 2021-11-12

## 2021-11-12 DIAGNOSIS — M54.50 ACUTE BILATERAL LOW BACK PAIN WITHOUT SCIATICA: Primary | ICD-10-CM

## 2021-11-12 PROCEDURE — 97014 ELECTRIC STIMULATION THERAPY: CPT | Performed by: PHYSICAL THERAPIST

## 2021-11-12 PROCEDURE — 97110 THERAPEUTIC EXERCISES: CPT | Performed by: PHYSICAL THERAPIST

## 2021-11-12 NOTE — PROGRESS NOTES
Physical Therapy Daily Progress Note     Diagnosis Plan   1. Acute bilateral low back pain without sciatica         VISIT#: 12    Subjective   Davian Arevalo reports: continues to report benefit in intensity and duration of pain in neck and LB, but still present. Back more painful than neck today.    Objective     See Exercise, Manual, and Modality Logs for complete treatment.     Patient Education: sitting, lifting and posture reviewed.    Assessment/Plan - Resumed emphasis on lumbar spine - trial of prone postioning with IFC abolished pain. Patient is very concrete in language and it is difficult to determine status; however, with additional conversation, patient is responding favorably to interventions with less intensify and duration of pain. Requires verbal and tactile cues for form and core.      Goals  Plan Goals: SHORT TERM GOALS: Time for Goal Achievement: 4 weeks    1.  Patient to be compliant w/ the HEP and tolerate progression of exercise.  MET        2.  Cervical and lumbar Pain level < 5/10 at worst with mentioned activities to improve function. 3/10 Met  3.  Increased lumbar AROM to by 25% in all planes to allow for increased ease with sit-stand transfers and functional activities. Progressing.  4. Less cervical and lumbar pain with shopping, ADL's and IADL's .Met    Progress per Plan of Care - re-assess for additional visits per insurance.            Timed:         Manual Therapy:         mins  32169;     Therapeutic Exercise:    30     mins  10320;     Neuromuscular Madina:       mins  04302;    Therapeutic Activity:          mins  47340;     Gait Training:           mins  59474;     Ultrasound:          mins  96705;    Ionto                                   mins   93772  Self Care                            mins   96801  Canalith Repos                  mins  4209  Aquatic                               mins 44895    Un-Timed:  Electrical Stimulation:    15     mins  29653 ( );  Dry Needling           mins self-pay  Traction          mins 97883  Low Eval          Mins  14737  Mod Eval          Mins  96854  High Eval                           Mins  97777  Re-Eval                               mins  05505    Timed Treatment: 30    mins   Total Treatment:    45   mins    Yemi Truong PT PT, DPT, [id number]

## 2021-11-18 ENCOUNTER — TREATMENT (OUTPATIENT)
Dept: PHYSICAL THERAPY | Facility: CLINIC | Age: 36
End: 2021-11-18

## 2021-11-18 DIAGNOSIS — M54.50 ACUTE BILATERAL LOW BACK PAIN WITHOUT SCIATICA: Primary | ICD-10-CM

## 2021-11-18 DIAGNOSIS — M54.2 NECK PAIN: ICD-10-CM

## 2021-11-18 PROCEDURE — 97110 THERAPEUTIC EXERCISES: CPT | Performed by: PHYSICAL THERAPIST

## 2021-11-18 PROCEDURE — 97140 MANUAL THERAPY 1/> REGIONS: CPT | Performed by: PHYSICAL THERAPIST

## 2021-11-18 PROCEDURE — 97014 ELECTRIC STIMULATION THERAPY: CPT | Performed by: PHYSICAL THERAPIST

## 2021-11-18 NOTE — PROGRESS NOTES
Physical Therapy Daily Progress Note     Diagnosis Plan   1. Acute bilateral low back pain without sciatica     2. Neck pain         VISIT#: 13    Subjective   Davian Arevalo reports: working on getting cabinet out of home: lifting - able to perfom without too much stress/pain. States the duration of pain and intensity of pain is less: 40% better overall in neck and LB. Still experiencing pain at base of neck and center of LB. Recent onset of hemorrhoids: Patient requested hold LB therapy today until he can contact MD and obtain treatment)    Subjective Questionnaire:   NDI: 22%  Oswestry: 6%  Objective          Tenderness     Additional Tenderness Details  Tender to palpation C7-T1 and L upper trap  L3-4 and L4-5 with overpressure.    Active Range of Motion   Cervical/Thoracic Spine   Cervical  Subcranial protraction: WFL   Subcranial retraction: restricted   Flexion: WFL  Extension: Neck active extension: min; pain less. with pain  Left rotation: Neck active rotation left: min with pain  Right rotation: Neck active rotation right: min      Lumbar   Flexion: Active lumbar flexion: min; pressure in calves - stretch.   Extension: Active lumbar extension: min.   Left rotation: Active left lumbar rotation: min  with pain  Right rotation: Active right lumbar rotation: min  with pain    Additional Active Range of Motion Details  Tension, no pain with rotation   Pain and ROM improved.    Strength/Myotome Testing     Additional Strength Details  B UE/ LE's 5/5 in major planes; weak core musculature.    Tests     Additional Tests Details  Cervical compression: increases pain  Cervical distraction: decreases pain  HL position: distraction reduced pain.        See Exercise, Manual, and Modality Logs for complete treatment.     Patient Education: sitting, lifting and posture reviewed.    Assessment/Plan - Focused on cervical spine today due to increase in pain and recent onset of hemorrhoids (patient requested hold LB therapy  today until he can contact MD and obtain treatment). Pain decreased in cervical spine and continues to benefit from therapy; sent information to insurance requesting POC be updated to 16 visits. Autism affects progression of exercise and interventions, but patient highly motivated.    Goals  Plan Goals: SHORT TERM GOALS: Time for Goal Achievement: 4 weeks    1.  Patient to be compliant w/ the HEP and tolerate progression of exercise.  MET        2.  Cervical and lumbar Pain level < 5/10 at worst with mentioned activities to improve function. 3/10 Met  3.  Increased lumbar AROM to by 25% in all planes to allow for increased ease with sit-stand transfers and functional activities. Progressing.  4. Less cervical and lumbar pain with shopping, ADL's and IADL's .Met    Progress per Plan of Care             Timed:         Manual Therapy:   15      mins  31105;     Therapeutic Exercise:    15     mins  09251;     Neuromuscular Madina:       mins  53870;    Therapeutic Activity:          mins  46017;     Gait Training:           mins  53363;     Ultrasound:          mins  60632;    Ionto                                   mins   26702  Self Care                            mins   24277  Canalith Repos                  mins  4209  Aquatic                               mins 73515    Un-Timed:  Electrical Stimulation:    15     mins  30108 ( );  Dry Needling          mins self-pay  Traction          mins 32891  Low Eval          Mins  39018  Mod Eval          Mins  37213  High Eval                           Mins  98432  Re-Eval                               mins  49809    Timed Treatment: 30    mins   Total Treatment:    45   mins    Yemi Truong PT PT, DPT, [id number]

## 2021-11-23 ENCOUNTER — LAB (OUTPATIENT)
Dept: FAMILY MEDICINE CLINIC | Facility: CLINIC | Age: 36
End: 2021-11-23

## 2021-11-23 ENCOUNTER — OFFICE VISIT (OUTPATIENT)
Dept: FAMILY MEDICINE CLINIC | Facility: CLINIC | Age: 36
End: 2021-11-23

## 2021-11-23 VITALS
DIASTOLIC BLOOD PRESSURE: 76 MMHG | HEART RATE: 74 BPM | OXYGEN SATURATION: 97 % | BODY MASS INDEX: 31.87 KG/M2 | TEMPERATURE: 97.1 F | WEIGHT: 235 LBS | SYSTOLIC BLOOD PRESSURE: 112 MMHG

## 2021-11-23 DIAGNOSIS — H66.001 NON-RECURRENT ACUTE SUPPURATIVE OTITIS MEDIA OF RIGHT EAR WITHOUT SPONTANEOUS RUPTURE OF TYMPANIC MEMBRANE: ICD-10-CM

## 2021-11-23 DIAGNOSIS — K64.9 HEMORRHOIDS, UNSPECIFIED HEMORRHOID TYPE: Primary | ICD-10-CM

## 2021-11-23 DIAGNOSIS — R53.83 FATIGUE, UNSPECIFIED TYPE: ICD-10-CM

## 2021-11-23 LAB
ALBUMIN SERPL-MCNC: 4.8 G/DL (ref 3.5–5.2)
ALBUMIN/GLOB SERPL: 1.7 G/DL
ALP SERPL-CCNC: 84 U/L (ref 39–117)
ALT SERPL W P-5'-P-CCNC: 58 U/L (ref 1–41)
ANION GAP SERPL CALCULATED.3IONS-SCNC: 7.6 MMOL/L (ref 5–15)
AST SERPL-CCNC: 27 U/L (ref 1–40)
BASOPHILS # BLD AUTO: 0.08 10*3/MM3 (ref 0–0.2)
BASOPHILS NFR BLD AUTO: 0.9 % (ref 0–1.5)
BILIRUB SERPL-MCNC: 0.4 MG/DL (ref 0–1.2)
BUN SERPL-MCNC: 13 MG/DL (ref 6–20)
BUN/CREAT SERPL: 13.4 (ref 7–25)
CALCIUM SPEC-SCNC: 9.9 MG/DL (ref 8.6–10.5)
CHLORIDE SERPL-SCNC: 101 MMOL/L (ref 98–107)
CO2 SERPL-SCNC: 28.4 MMOL/L (ref 22–29)
CREAT SERPL-MCNC: 0.97 MG/DL (ref 0.76–1.27)
DEPRECATED RDW RBC AUTO: 38.9 FL (ref 37–54)
EOSINOPHIL # BLD AUTO: 0.39 10*3/MM3 (ref 0–0.4)
EOSINOPHIL NFR BLD AUTO: 4.4 % (ref 0.3–6.2)
ERYTHROCYTE [DISTWIDTH] IN BLOOD BY AUTOMATED COUNT: 12.2 % (ref 12.3–15.4)
GFR SERPL CREATININE-BSD FRML MDRD: 87 ML/MIN/1.73
GLOBULIN UR ELPH-MCNC: 2.8 GM/DL
GLUCOSE SERPL-MCNC: 100 MG/DL (ref 65–99)
HCT VFR BLD AUTO: 47 % (ref 37.5–51)
HGB BLD-MCNC: 16.5 G/DL (ref 13–17.7)
IMM GRANULOCYTES # BLD AUTO: 0.04 10*3/MM3 (ref 0–0.05)
IMM GRANULOCYTES NFR BLD AUTO: 0.5 % (ref 0–0.5)
LYMPHOCYTES # BLD AUTO: 2.43 10*3/MM3 (ref 0.7–3.1)
LYMPHOCYTES NFR BLD AUTO: 27.7 % (ref 19.6–45.3)
MCH RBC QN AUTO: 30.7 PG (ref 26.6–33)
MCHC RBC AUTO-ENTMCNC: 35.1 G/DL (ref 31.5–35.7)
MCV RBC AUTO: 87.5 FL (ref 79–97)
MONOCYTES # BLD AUTO: 0.86 10*3/MM3 (ref 0.1–0.9)
MONOCYTES NFR BLD AUTO: 9.8 % (ref 5–12)
NEUTROPHILS NFR BLD AUTO: 4.98 10*3/MM3 (ref 1.7–7)
NEUTROPHILS NFR BLD AUTO: 56.7 % (ref 42.7–76)
NRBC BLD AUTO-RTO: 0 /100 WBC (ref 0–0.2)
PLATELET # BLD AUTO: 386 10*3/MM3 (ref 140–450)
PMV BLD AUTO: 9.6 FL (ref 6–12)
POTASSIUM SERPL-SCNC: 4.7 MMOL/L (ref 3.5–5.2)
PROT SERPL-MCNC: 7.6 G/DL (ref 6–8.5)
RBC # BLD AUTO: 5.37 10*6/MM3 (ref 4.14–5.8)
SODIUM SERPL-SCNC: 137 MMOL/L (ref 136–145)
TSH SERPL DL<=0.05 MIU/L-ACNC: 0.95 UIU/ML (ref 0.27–4.2)
WBC NRBC COR # BLD: 8.78 10*3/MM3 (ref 3.4–10.8)

## 2021-11-23 PROCEDURE — 99214 OFFICE O/P EST MOD 30 MIN: CPT | Performed by: FAMILY MEDICINE

## 2021-11-23 PROCEDURE — 36415 COLL VENOUS BLD VENIPUNCTURE: CPT | Performed by: FAMILY MEDICINE

## 2021-11-23 PROCEDURE — 80050 GENERAL HEALTH PANEL: CPT | Performed by: FAMILY MEDICINE

## 2021-11-23 RX ORDER — AZITHROMYCIN 250 MG/1
TABLET, FILM COATED ORAL
Qty: 6 TABLET | Refills: 0 | Status: SHIPPED | OUTPATIENT
Start: 2021-11-23 | End: 2022-03-23

## 2021-11-26 ENCOUNTER — HOSPITAL ENCOUNTER (EMERGENCY)
Facility: HOSPITAL | Age: 36
Discharge: HOME | End: 2021-11-26
Attending: EMERGENCY MEDICINE | Admitting: EMERGENCY MEDICINE

## 2021-11-26 ENCOUNTER — APPOINTMENT (OUTPATIENT)
Dept: GENERAL RADIOLOGY | Facility: HOSPITAL | Age: 36
End: 2021-11-26

## 2021-11-26 VITALS
BODY MASS INDEX: 31.03 KG/M2 | HEART RATE: 87 BPM | SYSTOLIC BLOOD PRESSURE: 123 MMHG | OXYGEN SATURATION: 100 % | HEIGHT: 72 IN | DIASTOLIC BLOOD PRESSURE: 85 MMHG | TEMPERATURE: 98 F | RESPIRATION RATE: 16 BRPM | WEIGHT: 229.06 LBS

## 2021-11-26 DIAGNOSIS — J70.9: Primary | ICD-10-CM

## 2021-11-26 PROCEDURE — 71045 X-RAY EXAM CHEST 1 VIEW: CPT

## 2021-11-26 PROCEDURE — 99282 EMERGENCY DEPT VISIT SF MDM: CPT

## 2021-11-29 ENCOUNTER — TREATMENT (OUTPATIENT)
Dept: PHYSICAL THERAPY | Facility: CLINIC | Age: 36
End: 2021-11-29

## 2021-11-29 DIAGNOSIS — M54.50 ACUTE BILATERAL LOW BACK PAIN WITHOUT SCIATICA: Primary | ICD-10-CM

## 2021-11-29 DIAGNOSIS — M54.2 NECK PAIN: ICD-10-CM

## 2021-11-29 PROCEDURE — 97140 MANUAL THERAPY 1/> REGIONS: CPT | Performed by: PHYSICAL THERAPIST

## 2021-11-29 PROCEDURE — 97110 THERAPEUTIC EXERCISES: CPT | Performed by: PHYSICAL THERAPIST

## 2021-11-29 PROCEDURE — 97014 ELECTRIC STIMULATION THERAPY: CPT | Performed by: PHYSICAL THERAPIST

## 2021-11-29 NOTE — PROGRESS NOTES
Physical Therapy Daily Progress Note     Diagnosis Plan   1. Acute bilateral low back pain without sciatica     2. Neck pain         VISIT#: 14    Subjective   Davian Arevalo reports: neck hurts today; after TM, LB hurts a little - hemorrhoids healing but taking time. Lifted heavy items at home to clear out area.     Subjective Questionnaire:   NDI: 22%  Oswestry: 6%  Objective          Tenderness     Additional Tenderness Details  Tender to palpation C7-T1 and L upper trap  L3-4 and L4-5 with overpressure.    Active Range of Motion   Cervical/Thoracic Spine   Cervical  Subcranial protraction: WFL   Subcranial retraction: restricted   Flexion: WFL  Extension: Neck active extension: min; pain less. with pain  Left rotation: Neck active rotation left: min with pain  Right rotation: Neck active rotation right: min      Lumbar   Flexion: Active lumbar flexion: min; pressure in calves - stretch.   Extension: Active lumbar extension: min.   Left rotation: Active left lumbar rotation: min  with pain  Right rotation: Active right lumbar rotation: min  with pain    Additional Active Range of Motion Details  Tension, no pain with rotation   Pain and ROM improved.    Strength/Myotome Testing     Additional Strength Details  B UE/ LE's 5/5 in major planes; weak core musculature.    Tests     Additional Tests Details  Cervical compression: increases pain  Cervical distraction: decreases pain  HL position: distraction reduced pain.        See Exercise, Manual, and Modality Logs for complete treatment.     Patient Education: sitting, lifting and posture reviewed.    Assessment/Plan - primarily focused on cervical spine, but reinforced importance of modifying activities: patient continues to lift heavy things at home which aggravate conditions.    Goals  Plan Goals: SHORT TERM GOALS: Time for Goal Achievement: 4 weeks    1.  Patient to be compliant w/ the HEP and tolerate progression of exercise.  MET        2.  Cervical and  lumbar Pain level < 5/10 at worst with mentioned activities to improve function. 3/10 Met  3.  Increased lumbar AROM to by 25% in all planes to allow for increased ease with sit-stand transfers and functional activities. Progressing.  4. Less cervical and lumbar pain with shopping, ADL's and IADL's .Met    Progress per Plan of Care             Timed:         Manual Therapy:   15      mins  57799;     Therapeutic Exercise:    15     mins  25597;     Neuromuscular Madina:       mins  79079;    Therapeutic Activity:          mins  23632;     Gait Training:           mins  42185;     Ultrasound:          mins  30010;    Ionto                                   mins   85665  Self Care                            mins   98785  Canalith Repos                  mins  4209  Aquatic                               mins 81650    Un-Timed:  Electrical Stimulation:    15     mins  65005 ( );  Dry Needling          mins self-pay  Traction          mins 51867  Low Eval          Mins  56931  Mod Eval          Mins  45899  High Eval                           Mins  28513  Re-Eval                               mins  61029    Timed Treatment: 30    mins   Total Treatment:    45   mins    Yemi Truong PT PT, DPT, [id number]

## 2021-12-02 ENCOUNTER — TREATMENT (OUTPATIENT)
Dept: PHYSICAL THERAPY | Facility: CLINIC | Age: 36
End: 2021-12-02

## 2021-12-02 DIAGNOSIS — M54.50 ACUTE BILATERAL LOW BACK PAIN WITHOUT SCIATICA: ICD-10-CM

## 2021-12-02 DIAGNOSIS — M54.2 NECK PAIN: Primary | ICD-10-CM

## 2021-12-02 PROCEDURE — 97110 THERAPEUTIC EXERCISES: CPT | Performed by: PHYSICAL THERAPIST

## 2021-12-02 PROCEDURE — 97530 THERAPEUTIC ACTIVITIES: CPT | Performed by: PHYSICAL THERAPIST

## 2021-12-02 NOTE — PROGRESS NOTES
Physical Therapy Daily Progress Note     Diagnosis Plan   1. Neck pain     2. Acute bilateral low back pain without sciatica         VISIT#: 15    Subjective   Davian Arevalo reports: mild neck and LBP today; did some lifting yesterday at home with back hurting more than neck; status roughly same: 40% better overall in neck and LB.     Having a hard time not lifting things due to maintainence on house.    Subjective Questionnaire:   NDI: 22%  Oswestry: 6%    Objective          Tenderness     Additional Tenderness Details  Tender to palpation C7-T1 and L upper trap  L3-4 and L4-5 with overpressure.    Active Range of Motion   Cervical/Thoracic Spine   Cervical  Subcranial protraction: WFL   Subcranial retraction: restricted   Flexion: WFL  Extension: Neck active extension: min; pain less. with pain  Left rotation: Neck active rotation left: min with pain  Right rotation: Neck active rotation right: min      Lumbar   Flexion: Active lumbar flexion: min; pressure in calves - stretch.   Extension: Active lumbar extension: min.   Left rotation: Active left lumbar rotation: min  with pain  Right rotation: Active right lumbar rotation: min  with pain    Additional Active Range of Motion Details  Tension, no pain with rotation   Pain and ROM improved.    Strength/Myotome Testing     Additional Strength Details  B UE/ LE's 5/5 in major planes; weak core musculature.    Tests     Additional Tests Details  Cervical compression: increases pain  Cervical distraction: decreases pain  HL position: distraction reduced pain.        See Exercise, Manual, and Modality Logs for complete treatment.     Patient Education: sitting, lifting and posture reviewed.  Access Code: [email or web address]  URL: [email or web address]  Date: 12/02/2021  Prepared by: Gilbert Truong    Exercises  Hooklying Single Knee to Chest - 1 x daily - 3 x weekly - 1 sets - 10 reps - 3 sec hold hold  90/90 Lower Trunk Rotation - 1 x daily - 3 x weekly - 1 sets - 10  reps  Supine Piriformis Stretch with Foot on Ground - 1 x daily - 3 x weekly - 1 sets - 3 reps - 20 sec hold  Supine Hamstring Stretch - 1 x daily - 3 x weekly - 1 sets - 3 reps - 20 sec hold  Hooklying Small March - 1 x daily - 3 x weekly - 2 sets - 10 reps  Prone Press Up on Elbows - 1 x daily - 3 x weekly - 1-2 sets - 10 reps  Seated Cervical Retraction - 1 x daily - 3 x weekly - 1-2 sets - 10 reps  Seated Upper Thoracic Stretch - 1 x daily - 3 x weekly - 1 sets - 10 reps  Sternocleidomastoid Stretch - 1 x daily - 3 x weekly - 1 sets - 3 reps - 20 sec hold  Gentle Levator Scapulae Stretch - 1 x daily - 3 x weekly - 1 sets - 3 reps - 20 sec hold  Doorway Pec Stretch at 90 Degrees Abduction - 1 x daily - 3 x weekly - 1 sets - 3 reps - 20 sec hold  Scapular Retraction with Resistance - 1 x daily - 3 x weekly - 3 sets - 10 reps  Shoulder extension with resistance - Neutral - 1 x daily - 3 x weekly - 3 sets - 10 reps    Assessment/Plan - spent most of treatment reviewing HEP for both neck and LB; importance of proper lifting and avoiding heavy lifting if possible; posture and use of lumbar pillow for support.     Patient seems to have plateaued in therapy - if symptoms worsen,  recommend referral to pain management for possible injections.      Goals  Plan Goals: SHORT TERM GOALS: Time for Goal Achievement: 4 weeks    1.  Patient to be compliant w/ the HEP and tolerate progression of exercise.  MET        2.  Cervical and lumbar Pain level < 5/10 at worst with mentioned activities to improve function. 3/10 Met  3.  Increased lumbar AROM to by 25% in all planes to allow for increased ease with sit-stand transfers and functional activities. Progressing.  4. Less cervical and lumbar pain with shopping, ADL's and IADL's .Met    LONG TERM GOALS: Time for Goal Achievement: D/C  1.  Outcome survey to show significant improvement. MET  2.  Pain level <2/10 with all listed activities to return to normal.NOT MET  3.  Cervical  and Lumbar AROM to WFL to allow for return to household, work & recreational activities w/o increase in symptoms. MET  4.  (B) LE and lower abdominal strength to 5/5 to allow for pushing, pulling and activities to occur without pain (driving, sitting, household  & Job requirements). MET    Other - DISCHARGE WITH HEP.              Timed:         Manual Therapy:         mins  98231;     Therapeutic Exercise:    35    mins  54300;     Neuromuscular Madina:       mins  89318;    Therapeutic Activity:    10      mins  78067;     Gait Training:           mins  94940;     Ultrasound:          mins  23827;    Ionto                                   mins   97326  Self Care                            mins   44626  Canalith Repos                  mins  4209  Aquatic                               mins 52543    Un-Timed:  Electrical Stimulation:       mins  93894 ( );  Dry Needling          mins self-pay  Traction          mins 12560  Low Eval          Mins  18487  Mod Eval          Mins  95062  High Eval                           Mins  59732  Re-Eval                               mins  58335    Timed Treatment: 45    mins   Total Treatment:    45   mins    Yemi Truong PT PT, DPT, [id number]

## 2021-12-03 NOTE — PROGRESS NOTES
Discharge Summary  Discharge Summary from Physical Therapy Report    Patient: Davian Arevalo   : 1985  Diagnosis/ICD-10 Code:  Acute bilateral low back pain without sciatica [M54.5]  Referring practitioner: Merlene Zamorano MD  Date of Initial Visit: 2021    Dates  PT visit: 21 to 21/3/21  Number of Visits:  15    Discharge Status of Patient: See above note for details.    Goals: Partially Met    Discharge Plan: Continue with current home exercise program as instructed  Refer to other services (specify):if pain worsens, consider pain management.     Date of Discharge 21        Yemi Truong PT  Physical Therapist

## 2022-01-07 ENCOUNTER — TELEPHONE (OUTPATIENT)
Dept: FAMILY MEDICINE CLINIC | Facility: CLINIC | Age: 37
End: 2022-01-07

## 2022-01-07 NOTE — TELEPHONE ENCOUNTER
Caller: ISAIAH GOMEZ    Relationship: Mother    Best call back number:[phone]    What form or medical record are you requesting: [employer] ACCOUNT INFORMATION    Who is requesting this form or medical record from you: PATIENT'S MOTHER    How would you like to receive the form or medical records (pick-up, mail, fax):MAILED  If fax, what is the fax number:   If mail, what is the address: [address] [place] [address]  If pick-up, provide patient with address and location details    Timeframe paperwork needed:     Additional notes: PATIENT'S MOTHER CALLED IN AND ASKED IF THE FORMS FROM 12/13 CAN BE MAILED BACK TO HER. SHE SAID SHE WAS NEVER CONTACTED ABOUT THEM BEING COMPLETED.

## 2022-02-04 RX ORDER — ATENOLOL 25 MG/1
TABLET ORAL
Qty: 90 TABLET | Refills: 2 | Status: SHIPPED | OUTPATIENT
Start: 2022-02-04 | End: 2023-01-13

## 2022-03-23 ENCOUNTER — OFFICE VISIT (OUTPATIENT)
Dept: FAMILY MEDICINE CLINIC | Facility: CLINIC | Age: 37
End: 2022-03-23

## 2022-03-23 VITALS
HEART RATE: 105 BPM | BODY MASS INDEX: 30.52 KG/M2 | WEIGHT: 225 LBS | SYSTOLIC BLOOD PRESSURE: 112 MMHG | TEMPERATURE: 98.6 F | OXYGEN SATURATION: 94 % | DIASTOLIC BLOOD PRESSURE: 79 MMHG

## 2022-03-23 DIAGNOSIS — R55 NEAR SYNCOPE: ICD-10-CM

## 2022-03-23 DIAGNOSIS — R07.9 CHEST PAIN, UNSPECIFIED TYPE: ICD-10-CM

## 2022-03-23 DIAGNOSIS — H66.003 NON-RECURRENT ACUTE SUPPURATIVE OTITIS MEDIA OF BOTH EARS WITHOUT SPONTANEOUS RUPTURE OF TYMPANIC MEMBRANES: Primary | ICD-10-CM

## 2022-03-23 PROCEDURE — 93000 ELECTROCARDIOGRAM COMPLETE: CPT | Performed by: FAMILY MEDICINE

## 2022-03-23 PROCEDURE — 99214 OFFICE O/P EST MOD 30 MIN: CPT | Performed by: FAMILY MEDICINE

## 2022-03-23 RX ORDER — AZITHROMYCIN 250 MG/1
TABLET, FILM COATED ORAL
Qty: 6 TABLET | Refills: 0 | Status: SHIPPED | OUTPATIENT
Start: 2022-03-23 | End: 2022-04-25

## 2022-03-23 NOTE — PROGRESS NOTES
Chief Complaint  Chest Pain (03-20 walked to the top of a stair case and felt like he was going to faint, SOA, headache, first time experiencing this )    Subjective          Davian Areavlo presents to [facility]  Chest Pain   This is a new problem. The current episode started in the past 7 days. The problem occurs intermittently. The problem has been waxing and waning. The pain is present in the substernal region. The quality of the pain is described as pressure. The pain does not radiate. Associated symptoms include back pain, dizziness, nausea, near-syncope and shortness of breath. Pertinent negatives include no syncope or vomiting. Risk factors include male gender.   Back Pain  Associated symptoms include chest pain.   Dizziness  This is a new problem. The current episode started in the past 7 days. The problem occurs intermittently. The problem has been waxing and waning. Associated symptoms include chest pain and nausea. Pertinent negatives include no vomiting.       Objective   Vital Signs:   /79 (BP Location: Left arm, Patient Position: Sitting, Cuff Size: Adult)   Pulse 105   Temp 98.6 °F (37 °C) (Infrared)   Wt 102 kg (225 lb)   SpO2 94%   BMI 30.52 kg/m²            Physical Exam  Vitals and nursing note reviewed.   Constitutional:       Appearance: Normal appearance.   HENT:      Right Ear: Tympanic membrane is erythematous.      Left Ear: Tympanic membrane is erythematous.   Eyes:      Pupils: Pupils are equal, round, and reactive to light.   Cardiovascular:      Rate and Rhythm: Regular rhythm.      Heart sounds: Normal heart sounds.   Pulmonary:      Breath sounds: Normal breath sounds.   Abdominal:      Palpations: Abdomen is soft.   Musculoskeletal:      Cervical back: Normal range of motion.   Neurological:      Mental Status: He is alert. Mental status is at baseline.   Psychiatric:         Mood and Affect: Affect is flat.        Result Review :   The  following data was reviewed by: Merlene Zamorano MD on 03/23/2022:  Common labs    Common Labsle 4/17/21 4/17/21 11/23/21 11/23/21    0235 0235 0924 0924   Glucose  96  100 (A)   BUN  18  13   Creatinine  0.85  0.97   eGFR Non African Am  101  87   Sodium  138  137   Potassium  3.9  4.7   Chloride  99  101   Calcium  9.9  9.9   Albumin  4.30  4.80   Total Bilirubin  0.6  0.4   Alkaline Phosphatase  91  84   AST (SGOT)  29  27   ALT (SGPT)  40  58 (A)   WBC 8.20  8.78    Hemoglobin 15.4  16.5    Hematocrit 43.1  47.0    Platelets 364  386    (A) Abnormal value                 ECG 12 Lead    Date/Time: 3/23/2022 3:22 PM  Performed by: Merlene Zamorano MD  Authorized by: Merlene Zamorano MD   Comparison: compared with previous ECG from 4/17/2021  Comparison to previous ECG: No bigeminy noted  Rhythm: sinus rhythm  Rate: normal  BPM: 92  ST Segments: ST segments normal  QRS axis: normal  Other findings: T wave abnormality    Clinical impression: non-specific ECG              Assessment and Plan    Diagnoses and all orders for this visit:    1. Non-recurrent acute suppurative otitis media of both ears without spontaneous rupture of tympanic membranes (Primary)  -     azithromycin (Zithromax Z-Savage) 250 MG tablet; Take 2 tablets the first day, then 1 tablet daily for 4 days.  Dispense: 6 tablet; Refill: 0    2. Near syncope  -     Holter Monitor - 24 Hour; Future    3. Chest pain, unspecified type  -     ECG 12 Lead  -     Holter Monitor - 24 Hour; Future        Follow Up   No follow-ups on file.  Patient was given instructions and counseling regarding his condition or for health maintenance advice. Please see specific information pulled into the AVS if appropriate.

## 2022-04-04 ENCOUNTER — HOSPITAL ENCOUNTER (OUTPATIENT)
Dept: RESPIRATORY THERAPY | Facility: HOSPITAL | Age: 37
Discharge: HOME | End: 2022-04-04
Admitting: FAMILY MEDICINE

## 2022-04-04 DIAGNOSIS — R55 NEAR SYNCOPE: ICD-10-CM

## 2022-04-04 DIAGNOSIS — R07.9 CHEST PAIN, UNSPECIFIED TYPE: ICD-10-CM

## 2022-04-04 PROCEDURE — 93225 XTRNL ECG REC<48 HRS REC: CPT

## 2022-04-07 PROCEDURE — 93227 XTRNL ECG REC<48 HR R&I: CPT | Performed by: INTERNAL MEDICINE

## 2022-04-08 ENCOUNTER — OFFICE VISIT (OUTPATIENT)
Dept: FAMILY MEDICINE CLINIC | Facility: CLINIC | Age: 37
End: 2022-04-08

## 2022-04-08 VITALS
DIASTOLIC BLOOD PRESSURE: 82 MMHG | HEART RATE: 83 BPM | HEIGHT: 72 IN | WEIGHT: 227.4 LBS | BODY MASS INDEX: 30.8 KG/M2 | TEMPERATURE: 98.4 F | SYSTOLIC BLOOD PRESSURE: 123 MMHG | OXYGEN SATURATION: 98 %

## 2022-04-08 DIAGNOSIS — S99.921A INJURY OF RIGHT TOE, INITIAL ENCOUNTER: Primary | ICD-10-CM

## 2022-04-08 PROCEDURE — 99213 OFFICE O/P EST LOW 20 MIN: CPT | Performed by: FAMILY MEDICINE

## 2022-04-08 NOTE — PROGRESS NOTES
"Chief Complaint  Bruised Toe (On His Right Foot)    Subjective          Davian Arevalo presents to [facility]  He stubbed his right great toe on a 300 lb lathe.    Toe Pain   The incident occurred more than 1 week ago. The incident occurred at home. The injury mechanism was a direct blow. The pain is present in the right toes. The quality of the pain is described as aching. The pain is moderate. The pain has been constant since onset. Pertinent negatives include no numbness or tingling. He reports no foreign bodies present. The symptoms are aggravated by weight bearing. He has tried nothing for the symptoms.       Objective   Vital Signs:   /82   Pulse 83   Temp 98.4 °F (36.9 °C)   Ht 182.9 cm (72\")   Wt 103 kg (227 lb 6.4 oz)   SpO2 98%   BMI 30.84 kg/m²            Physical Exam  Vitals and nursing note reviewed.   Constitutional:       General: He is not in acute distress.     Appearance: He is well-developed.   HENT:      Head: Normocephalic.   Eyes:      General: Lids are normal.   Neck:      Thyroid: No thyroid mass or thyromegaly.      Trachea: Trachea normal.   Cardiovascular:      Rate and Rhythm: Normal rate and regular rhythm.      Heart sounds: Normal heart sounds.   Pulmonary:      Effort: Pulmonary effort is normal.      Breath sounds: Normal breath sounds.   Musculoskeletal:      Cervical back: Normal range of motion.      Left foot: Decreased range of motion. Tenderness (2nd toe) present.   Lymphadenopathy:      Cervical: No cervical adenopathy.   Skin:     General: Skin is warm and dry.   Neurological:      Mental Status: He is alert and oriented to person, place, and time. Mental status is at baseline.   Psychiatric:         Attention and Perception: He is attentive.         Mood and Affect: Mood normal.         Speech: Speech normal.         Behavior: Behavior normal.        Result Review :   The following data was reviewed by: Merlene Zamorano MD " on 04/08/2022:  Common labs    Common Labsle 4/17/21 4/17/21 11/23/21 11/23/21    0235 0235 0924 0924   Glucose  96  100 (A)   BUN  18  13   Creatinine  0.85  0.97   eGFR Non African Am  101  87   Sodium  138  137   Potassium  3.9  4.7   Chloride  99  101   Calcium  9.9  9.9   Albumin  4.30  4.80   Total Bilirubin  0.6  0.4   Alkaline Phosphatase  91  84   AST (SGOT)  29  27   ALT (SGPT)  40  58 (A)   WBC 8.20  8.78    Hemoglobin 15.4  16.5    Hematocrit 43.1  47.0    Platelets 364  386    (A) Abnormal value                      Assessment and Plan    Diagnoses and all orders for this visit:    1. Injury of right toe, initial encounter (Primary)  Assessment & Plan:  Tylenol 1-2 tabs po q4h prn      Orders:  -     XR Toe 2+ View Right; Future      Follow Up   No follow-ups on file.  Patient was given instructions and counseling regarding his condition or for health maintenance advice. Please see specific information pulled into the AVS if appropriate.

## 2022-04-25 ENCOUNTER — OFFICE VISIT (OUTPATIENT)
Dept: CARDIOLOGY | Facility: CLINIC | Age: 37
End: 2022-04-25

## 2022-04-25 VITALS
SYSTOLIC BLOOD PRESSURE: 116 MMHG | WEIGHT: 227 LBS | HEART RATE: 87 BPM | DIASTOLIC BLOOD PRESSURE: 77 MMHG | OXYGEN SATURATION: 94 % | BODY MASS INDEX: 30.75 KG/M2 | HEIGHT: 72 IN

## 2022-04-25 DIAGNOSIS — R00.0 TACHYCARDIA: ICD-10-CM

## 2022-04-25 DIAGNOSIS — I10 ESSENTIAL HYPERTENSION: Primary | ICD-10-CM

## 2022-04-25 PROCEDURE — 99213 OFFICE O/P EST LOW 20 MIN: CPT | Performed by: INTERNAL MEDICINE

## 2022-04-25 NOTE — PROGRESS NOTES
Subjective:     Encounter Date:04/25/2022      Patient ID: Davian Arevalo is a 36 y.o. male.    Chief Complaint:  History of Present Illness 36-year-old white male with history of palpitations with arrhythmia and hypertension presents to my office for follow-up.  Patient is currently stable without any symptoms of chest pain or shortness of breath at rest on exertion but no complains any PND orthopnea.  He has occasional palpitations without any dizziness syncope or swelling of the feet but is taking his meds regular but he does not smoke.  Exercise regularly follows a good diet.    The following portions of the patient's history were reviewed and updated as appropriate: allergies, current medications, past family history, past medical history, past social history, past surgical history and problem list.  Past Medical History:   Diagnosis Date   • Abdominal pain, chronic, right lower quadrant 01/30/2015   • Anxiety    • Autism 08/13/2014    mild ( [facility]   • Carpal tunnel syndrome 02/22/2018   • Constipation 09/21/2018   • Depression    • Dyslexia 12/26/2017   • Hypertension    • IBS (irritable bowel syndrome) 09/21/2018   • IBS (irritable bowel syndrome)    • Knee pain, left 07/25/2017   • Lower back pain 01/30/2015   • Right cervical radiculopathy 01/07/2019   • Shoulder pain, right 02/14/2017   • Tinnitus    • Vitamin D deficiency 12/26/2017     Past Surgical History:   Procedure Laterality Date   • ADENOIDECTOMY     • COLONOSCOPY     • CYSTOSCOPY, URETEROSCOPY, RETROGRADE PYELOGRAM, STENT INSERTION Right 12/15/2020    Procedure: CYSTOSCOPY URETEROSCOPY STONE BASKET EXTRACTION AND STENT INSERTION;  Surgeon: Gerry Giron MD;  Location: [facility] OR;  Service: Urology;  Laterality: Right;   • TONSILLECTOMY  1993    Tonsillectomy and Adenoids-   • TONSILLECTOMY AND ADENOIDECTOMY       /77 (BP Location: Left arm, Patient Position: Sitting, Cuff Size: Adult)   Pulse 87   Ht  "182.9 cm (72\")   Wt 103 kg (227 lb)   SpO2 94%   BMI 30.79 kg/m²   Family History   Problem Relation Age of Onset   • Cancer Mother    • Cancer Father    • Hypertension Father    • Stroke Father    • Hyperlipidemia Father    • Hypertension Mother    • Atrial fibrillation Mother    • Diabetes Paternal Uncle    • Diabetes Maternal Grandmother    • Stroke Maternal Grandfather    • Diabetes Maternal Grandfather    • Stroke Other        Current Outpatient Medications:   •  atenolol (TENORMIN) 25 MG tablet, TAKE ONE TABLET BY MOUTH ONCE NIGHTLY, Disp: 90 tablet, Rfl: 2  •  cholecalciferol (VITAMIN D3) 1000 units tablet, Take 1,000 Units by mouth Daily., Disp: , Rfl:   •  dicyclomine (BENTYL) 20 MG tablet, Take 20 mg by mouth 3 (Three) Times a Day., Disp: , Rfl:   •  folic acid-vit B6-vit B12 (FOLBEE) 2.5-25-1 MG tablet tablet, Take  by mouth Daily., Disp: , Rfl:   •  hydrocortisone 2.5 % cream, Apply 1 application topically to the appropriate area as directed 2 (Two) Times a Day., Disp: 28 g, Rfl: 1  •  lisinopril (PRINIVIL,ZESTRIL) 20 MG tablet, TAKE ONE TABLET BY MOUTH DAILY, Disp: 90 tablet, Rfl: 3  •  nortriptyline (PAMELOR) 50 MG capsule, Take 100 mg by mouth Every Night., Disp: , Rfl:   •  vitamin B-12 (CYANOCOBALAMIN) 1000 MCG tablet, Take 1,000 mcg by mouth Daily., Disp: , Rfl:   Allergies   Allergen Reactions   • Ceclor [Cefaclor] Hives   • Amoxicillin Hives   • Cephalexin Hives     Tolerated CTX 12/2020 admission   • Cloral Hives     Social History     Socioeconomic History   • Marital status: Single   Tobacco Use   • Smoking status: Never Smoker   • Smokeless tobacco: Never Used   Substance and Sexual Activity   • Alcohol use: Never   • Drug use: No     Review of Systems   Constitutional: Positive for malaise/fatigue. Negative for fever.   Cardiovascular: Positive for palpitations. Negative for chest pain and dyspnea on exertion.   Respiratory: Negative for cough and shortness of breath.    Skin: Negative " for rash.   Gastrointestinal: Negative for abdominal pain, nausea and vomiting.   Neurological: Negative for dizziness, focal weakness, headaches and light-headedness.   All other systems reviewed and are negative.             Objective:     Constitutional:       Appearance: Well-developed.   Eyes:      General: No scleral icterus.     Conjunctiva/sclera: Conjunctivae normal.   HENT:      Head: Normocephalic and atraumatic.   Neck:      Vascular: No carotid bruit or JVD.   Pulmonary:      Effort: Pulmonary effort is normal.      Breath sounds: Normal breath sounds. No wheezing. No rales.   Cardiovascular:      Normal rate. Regular rhythm.   Pulses:     Intact distal pulses.   Abdominal:      General: Bowel sounds are normal.      Palpations: Abdomen is soft.   Musculoskeletal:      Cervical back: Normal range of motion and neck supple. Skin:     General: Skin is warm and dry.      Findings: No rash.   Neurological:      Mental Status: Alert.       Procedures    Lab Review:         MDM  1.  Palpitations with arrhythmia  Patient has occasional PACs and sinus tachycardia and hence he is on beta-blockers and stable  2.  Hypertension  Patient blood pressure currently stable on medical therapy      Patient's previous medical records, labs, and EKG were reviewed and discussed with the patient at today's visit.

## 2022-05-02 RX ORDER — LISINOPRIL 20 MG/1
TABLET ORAL
Qty: 90 TABLET | Refills: 3 | Status: SHIPPED | OUTPATIENT
Start: 2022-05-02

## 2022-05-11 ENCOUNTER — OFFICE VISIT (OUTPATIENT)
Dept: FAMILY MEDICINE CLINIC | Facility: CLINIC | Age: 37
End: 2022-05-11

## 2022-05-11 VITALS
TEMPERATURE: 97.8 F | WEIGHT: 225 LBS | BODY MASS INDEX: 30.52 KG/M2 | SYSTOLIC BLOOD PRESSURE: 118 MMHG | HEART RATE: 75 BPM | OXYGEN SATURATION: 94 % | DIASTOLIC BLOOD PRESSURE: 76 MMHG

## 2022-05-11 DIAGNOSIS — H66.002 NON-RECURRENT ACUTE SUPPURATIVE OTITIS MEDIA OF LEFT EAR WITHOUT SPONTANEOUS RUPTURE OF TYMPANIC MEMBRANE: Primary | ICD-10-CM

## 2022-05-11 PROCEDURE — 99213 OFFICE O/P EST LOW 20 MIN: CPT | Performed by: FAMILY MEDICINE

## 2022-05-11 RX ORDER — AZITHROMYCIN 250 MG/1
TABLET, FILM COATED ORAL
Qty: 6 TABLET | Refills: 0 | Status: SHIPPED | OUTPATIENT
Start: 2022-05-11 | End: 2022-06-15

## 2022-05-11 NOTE — PROGRESS NOTES
Chief Complaint  Sore Throat (Diarrhea,fatigue on going for 1 week )    Subjective          Davian Arevalo presents to [facility]  No known exposure to illness. No loss of taste or smell.      Sore Throat   This is a new problem. The current episode started in the past 7 days. The problem has been unchanged. There has been no fever. Associated symptoms include congestion, coughing, diarrhea, ear pain and headaches. He has had no exposure to strep. He has tried nothing for the symptoms.       Objective   Vital Signs:  /76 (BP Location: Left arm, Patient Position: Sitting, Cuff Size: Adult)   Pulse 75   Temp 97.8 °F (36.6 °C) (Infrared)   Wt 102 kg (225 lb)   SpO2 94%   BMI 30.52 kg/m²           Physical Exam  Vitals and nursing note reviewed.   Constitutional:       Appearance: Normal appearance.   HENT:      Head: Normocephalic.      Right Ear: Tympanic membrane normal.      Left Ear: Tympanic membrane is injected.   Eyes:      Extraocular Movements: Extraocular movements intact.      Pupils: Pupils are equal, round, and reactive to light.   Cardiovascular:      Rate and Rhythm: Regular rhythm.      Heart sounds: Normal heart sounds.   Pulmonary:      Breath sounds: Normal breath sounds.   Musculoskeletal:      Cervical back: Neck supple.   Skin:     General: Skin is warm.   Neurological:      Mental Status: He is alert. Mental status is at baseline.        Result Review :   The following data was reviewed by: Merlene Zamorano MD on 05/11/2022:  Common labs    Common Labsle 11/23/21 11/23/21    0924 0924   Glucose  100 (A)   BUN  13   Creatinine  0.97   eGFR Non African Am  87   Sodium  137   Potassium  4.7   Chloride  101   Calcium  9.9   Albumin  4.80   Total Bilirubin  0.4   Alkaline Phosphatase  84   AST (SGOT)  27   ALT (SGPT)  58 (A)   WBC 8.78    Hemoglobin 16.5    Hematocrit 47.0    Platelets 386    (A) Abnormal value                      Assessment and Plan     Diagnoses and all orders for this visit:    1. Non-recurrent acute suppurative otitis media of left ear without spontaneous rupture of tympanic membrane (Primary)  -     azithromycin (Zithromax Z-Savage) 250 MG tablet; Take 2 tablets the first day, then 1 tablet daily for 4 days.  Dispense: 6 tablet; Refill: 0             Follow Up   No follow-ups on file.  Patient was given instructions and counseling regarding his condition or for health maintenance advice. Please see specific information pulled into the AVS if appropriate.

## 2022-06-14 ENCOUNTER — HOSPITAL ENCOUNTER (EMERGENCY)
Facility: HOSPITAL | Age: 37
Discharge: HOME | End: 2022-06-14
Attending: EMERGENCY MEDICINE | Admitting: EMERGENCY MEDICINE

## 2022-06-14 VITALS
HEIGHT: 72 IN | SYSTOLIC BLOOD PRESSURE: 116 MMHG | DIASTOLIC BLOOD PRESSURE: 74 MMHG | BODY MASS INDEX: 29.86 KG/M2 | RESPIRATION RATE: 18 BRPM | WEIGHT: 220.46 LBS | TEMPERATURE: 97.5 F | HEART RATE: 77 BPM | OXYGEN SATURATION: 98 %

## 2022-06-14 DIAGNOSIS — S61.238A PUNCTURE WOUND OF INDEX FINGER, INITIAL ENCOUNTER: Primary | ICD-10-CM

## 2022-06-14 PROCEDURE — 90715 TDAP VACCINE 7 YRS/> IM: CPT | Performed by: PHYSICIAN ASSISTANT

## 2022-06-14 PROCEDURE — 99282 EMERGENCY DEPT VISIT SF MDM: CPT

## 2022-06-14 PROCEDURE — 25010000002 TETANUS-DIPHTH-ACELL PERTUSSIS 5-2.5-18.5 LF-MCG/0.5 SUSPENSION PREFILLED SYRINGE: Performed by: PHYSICIAN ASSISTANT

## 2022-06-14 PROCEDURE — 90471 IMMUNIZATION ADMIN: CPT | Performed by: PHYSICIAN ASSISTANT

## 2022-06-14 RX ADMIN — TETANUS TOXOID, REDUCED DIPHTHERIA TOXOID AND ACELLULAR PERTUSSIS VACCINE, ADSORBED 0.5 ML: 5; 2.5; 8; 8; 2.5 SUSPENSION INTRAMUSCULAR at 15:55

## 2022-06-14 NOTE — ED NOTES
Pt states he was stripping wire today. States left hand, index finger was poke with wire. States he put his finger in his mouth immediately afterwards and is concerned for chemical poisoning.

## 2022-06-14 NOTE — ED PROVIDER NOTES
Subjective   Patient is a 36-year-old  male with history of a anxiety, mild autism, hypertension presenting to the ER for complaint of chemical exposure on his finger and in his mouth that occurred a few hours ago.  The patient states he was at work when he poked his left index finger with a copper wire.  Patient states that he had just stripped the outer coating of the wire and accidentally poked his left index finger.  Patient states there is minimal bleeding.  Patient states he did put his finger directly in his mouth after that and reports he felt a mild burning sensation in his throat that went away after approximately 40 minutes.    He rinsed his mouth and throat with water. He reports the tingling and burning sensation in his mouth and throat have subsided at this time.  He denies burning or pain to the left index finger.  He denies any chest pain, shortness of breath difficulty breathing or swallowing.  He denies vomiting, diarrhea, syncope, headache, dizziness, and visual changes.  The patient believes he is due for a tetanus shot.      History provided by:  Patient      Review of Systems   Constitutional: Negative for chills and fever.   HENT: Negative for sore throat and trouble swallowing.         Dry throat   Eyes: Negative.    Respiratory: Negative for shortness of breath and wheezing.    Cardiovascular: Negative for chest pain.   Gastrointestinal: Negative for abdominal pain, diarrhea, nausea and vomiting.   Endocrine: Negative.    Genitourinary: Negative for dysuria.   Skin: Positive for wound. Negative for rash.   Allergic/Immunologic: Negative.    Neurological: Negative for headaches.   Psychiatric/Behavioral: Negative for behavioral problems.   All other systems reviewed and are negative.      Past Medical History:   Diagnosis Date   • Abdominal pain, chronic, right lower quadrant 01/30/2015   • Anxiety    • Autism 08/13/2014    mild ( Abstracted from [facility].)   • Carpal tunnel  syndrome 02/22/2018   • Constipation 09/21/2018   • Depression    • Dyslexia 12/26/2017   • Hypertension    • IBS (irritable bowel syndrome) 09/21/2018   • IBS (irritable bowel syndrome)    • Knee pain, left 07/25/2017   • Lower back pain 01/30/2015   • Right cervical radiculopathy 01/07/2019   • Shoulder pain, right 02/14/2017   • Tinnitus    • Vitamin D deficiency 12/26/2017       Allergies   Allergen Reactions   • Ceclor [Cefaclor] Hives   • Amoxicillin Hives   • Cephalexin Hives     Tolerated CTX 12/2020 admission   • Cloral Hives       Past Surgical History:   Procedure Laterality Date   • ADENOIDECTOMY     • COLONOSCOPY     • CYSTOSCOPY, URETEROSCOPY, RETROGRADE PYELOGRAM, STENT INSERTION Right 12/15/2020    Procedure: CYSTOSCOPY URETEROSCOPY STONE BASKET EXTRACTION AND STENT INSERTION;  Surgeon: Gerry Giron MD;  Location: [facility] OR;  Service: Urology;  Laterality: Right;   • TONSILLECTOMY  1993    Tonsillectomy and Adenoids-   • TONSILLECTOMY AND ADENOIDECTOMY         Family History   Problem Relation Age of Onset   • Cancer Mother    • Cancer Father    • Hypertension Father    • Stroke Father    • Hyperlipidemia Father    • Hypertension Mother    • Atrial fibrillation Mother    • Diabetes Paternal Uncle    • Diabetes Maternal Grandmother    • Stroke Maternal Grandfather    • Diabetes Maternal Grandfather    • Stroke Other        Social History     Socioeconomic History   • Marital status: Single   Tobacco Use   • Smoking status: Never Smoker   • Smokeless tobacco: Never Used   Substance and Sexual Activity   • Alcohol use: Never   • Drug use: No           Objective   Physical Exam  Vitals and nursing note reviewed.   Constitutional:       General: He is not in acute distress.     Appearance: Normal appearance. He is normal weight. He is not diaphoretic.   HENT:      Head: Normocephalic and atraumatic.      Nose: Nose normal.      Mouth/Throat:      Mouth: Mucous membranes are moist.   Eyes:       "Extraocular Movements: Extraocular movements intact.      Pupils: Pupils are equal, round, and reactive to light.   Cardiovascular:      Rate and Rhythm: Normal rate and regular rhythm.      Pulses: Normal pulses.      Heart sounds: Normal heart sounds. No murmur heard.  Pulmonary:      Effort: Pulmonary effort is normal.      Breath sounds: Normal breath sounds.   Musculoskeletal:         General: No swelling, tenderness or deformity. Normal range of motion.      Cervical back: Normal range of motion.      Right lower leg: No edema.      Left lower leg: No edema.      Comments: Radial pulses present 2+ bilaterally   Skin:     General: Skin is warm.      Capillary Refill: Capillary refill takes less than 2 seconds.      Findings: No bruising or erythema.      Comments: Fingers inspected, very small punctate wound, no bleed no bruising   Neurological:      General: No focal deficit present.      Mental Status: He is alert and oriented to person, place, and time.      Cranial Nerves: No cranial nerve deficit.      Motor: No weakness.   Psychiatric:         Mood and Affect: Mood normal.         Behavior: Behavior normal.         Procedures           ED Course    /74   Pulse 77   Temp 97.5 °F (36.4 °C)   Resp 18   Ht 182.9 cm (72\")   Wt 100 kg (220 lb 7.4 oz)   SpO2 98%   BMI 29.90 kg/m²   Labs Reviewed - No data to display  Medications   Tetanus-Diphth-Acell Pertussis (BOOSTRIX) injection 0.5 mL (0.5 mL Intramuscular Given 6/14/22 1555)     No radiology results for the last day                                               MDM  Number of Diagnoses or Management Options  Puncture wound of index finger, initial encounter  Diagnosis management comments: MEDICAL DECISION  Epic Chart Review: No recent admissions  Comorbidities: Anxiety, hypertension  Differentials: Puncture wound, bruising, foreign body; this list is not all inclusive and does not constitute the entirety of considered causes  Radiology " interpretation:  Images reviewed by me and interpreted by radiologist, not warranted  Lab interpretation:  Labs viewed by me significant for, not warranted    While in the ED appropriate PPE was worn during exam and throughout all encounters with the patient.  Physical exam unremarkable, patient is very small punctate jose francisco on his right index finger.  Patient is full range of motion, there is no palpable foreign body on exam.  Patient is given tetanus shot.  This was an otherwise unremarkable, not shows no ulcerations, bleeding or signs of injury.  Patient reports no chest pain, shortness of breath headache dizziness lightheadedness or nausea vomiting while in the ER.  Patient has follow-up appoint with his primary care provider tomorrow.  Patient felt to be stable for discharge.    Discharge plan and instructions were discussed with the patient who verbalized understanding and is in agreement with the plan, all questions were answered at this time.  Patient is aware of signs symptoms that would require immediate return to the emergency room.  Patient understands importance of following up with primary care provider for further evaluation and worsening concerns as well as blood pressure recheck in the next 4 weeks.    Patient was discharged in improved stable condition with an upright steady gait.      Patient Progress  Patient progress: stable      Final diagnoses:   Puncture wound of index finger, initial encounter       ED Disposition  ED Disposition     ED Disposition   Discharge    Condition   Stable    Comment   --             Merlene Zamorano MD  [address] [id number] [phone]    Schedule an appointment as soon as possible for a visit in 2 days  As needed, If symptoms worsen         Medication List      No changes were made to your prescriptions during this visit.          Thao Toth PA  06/14/22 3082

## 2022-06-14 NOTE — DISCHARGE INSTRUCTIONS
Follow-up with primary care provider tomorrow as scheduled    Take Tylenol as needed for pain    Drink plenty fluids    Return to the ER for new or worsening symptoms

## 2022-06-15 ENCOUNTER — OFFICE VISIT (OUTPATIENT)
Dept: FAMILY MEDICINE CLINIC | Facility: CLINIC | Age: 37
End: 2022-06-15

## 2022-06-15 VITALS
DIASTOLIC BLOOD PRESSURE: 74 MMHG | TEMPERATURE: 97.5 F | OXYGEN SATURATION: 96 % | SYSTOLIC BLOOD PRESSURE: 116 MMHG | HEART RATE: 88 BPM | WEIGHT: 222 LBS | BODY MASS INDEX: 30.11 KG/M2

## 2022-06-15 DIAGNOSIS — L23.7 CONTACT DERMATITIS DUE TO POISON IVY: ICD-10-CM

## 2022-06-15 DIAGNOSIS — S61.231A PUNCTURE WOUND OF LEFT INDEX FINGER: Primary | ICD-10-CM

## 2022-06-15 PROCEDURE — 99213 OFFICE O/P EST LOW 20 MIN: CPT | Performed by: FAMILY MEDICINE

## 2022-08-26 ENCOUNTER — OFFICE (OUTPATIENT)
Dept: URBAN - METROPOLITAN AREA CLINIC 64 | Facility: CLINIC | Age: 37
End: 2022-08-26
Payer: MEDICAID

## 2022-08-26 VITALS — HEIGHT: 72 IN | SYSTOLIC BLOOD PRESSURE: 108 MMHG | DIASTOLIC BLOOD PRESSURE: 77 MMHG | HEART RATE: 69 BPM

## 2022-08-26 DIAGNOSIS — K21.9 GASTRO-ESOPHAGEAL REFLUX DISEASE WITHOUT ESOPHAGITIS: ICD-10-CM

## 2022-08-26 PROCEDURE — 99214 OFFICE O/P EST MOD 30 MIN: CPT | Performed by: INTERNAL MEDICINE

## 2022-08-26 RX ORDER — NORTRIPTYLINE HYDROCHLORIDE 50 MG/1
CAPSULE ORAL
Qty: 180 | Refills: 2 | Status: ACTIVE

## 2022-09-07 ENCOUNTER — OFFICE VISIT (OUTPATIENT)
Dept: FAMILY MEDICINE CLINIC | Facility: CLINIC | Age: 37
End: 2022-09-07

## 2022-09-07 VITALS
TEMPERATURE: 97.8 F | BODY MASS INDEX: 29.97 KG/M2 | OXYGEN SATURATION: 96 % | WEIGHT: 221 LBS | DIASTOLIC BLOOD PRESSURE: 77 MMHG | SYSTOLIC BLOOD PRESSURE: 111 MMHG | HEART RATE: 73 BPM

## 2022-09-07 DIAGNOSIS — N50.89 LUMP IN TESTIS: ICD-10-CM

## 2022-09-07 DIAGNOSIS — N50.82 SCROTAL PAIN: Primary | ICD-10-CM

## 2022-09-07 PROCEDURE — 99213 OFFICE O/P EST LOW 20 MIN: CPT | Performed by: FAMILY MEDICINE

## 2022-09-07 NOTE — PROGRESS NOTES
"Chief Complaint  Testicle Pain (X 5 qd- LT- found a mass- pain comes and goes )    Subjective        Davian Arevalo presents to [facility]  Testicle Pain  The patient's primary symptoms include testicular pain. The patient's pertinent negatives include no genital injury or scrotal swelling. This is a new problem. The current episode started in the past 7 days. The problem occurs constantly. The problem has been unchanged. Pertinent negatives include no chills, fever, frequency, hematuria, nausea, urgency or urinary retention. The testicular pain affects the left testicle. The color of the testicles is normal. Nothing aggravates the symptoms. He has tried nothing for the symptoms.       Objective   Vital Signs:  /77 (BP Location: Left arm, Patient Position: Sitting, Cuff Size: Large Adult)   Pulse 73   Temp 97.8 °F (36.6 °C) (Infrared)   Wt 100 kg (221 lb)   SpO2 96%   BMI 29.97 kg/m²   Estimated body mass index is 29.97 kg/m² as calculated from the following:    Height as of 6/14/22: 182.9 cm (72\").    Weight as of this encounter: 100 kg (221 lb).          Physical Exam  Vitals and nursing note reviewed.   Cardiovascular:      Rate and Rhythm: Regular rhythm.      Heart sounds: Normal heart sounds.   Genitourinary:     Penis: Normal.       Testes:         Left: Tenderness and swelling present.   Musculoskeletal:      Cervical back: Neck supple.   Neurological:      Mental Status: He is alert.        Result Review :  The following data was reviewed by: Merlene Zamorano MD on 09/07/2022:  Common labs    Common Labsle 11/23/21 11/23/21    0924 0924   Glucose  100 (A)   BUN  13   Creatinine  0.97   eGFR Non African Am  87   Sodium  137   Potassium  4.7   Chloride  101   Calcium  9.9   Albumin  4.80   Total Bilirubin  0.4   Alkaline Phosphatase  84   AST (SGOT)  27   ALT (SGPT)  58 (A)   WBC 8.78    Hemoglobin 16.5    Hematocrit 47.0    Platelets 386    (A) Abnormal value  "                     Assessment and Plan   Diagnoses and all orders for this visit:    1. Scrotal pain (Primary)  Assessment & Plan:  Tylenol 1-2 tabs po q4h prn      Orders:  -     US Scrotum & Testicles; Future    2. Lump in testis  -     US Scrotum & Testicles; Future           Follow Up {Instructions Charge Capture  Follow-up Communications :23}  No follow-ups on file.  Patient was given instructions and counseling regarding his condition or for health maintenance advice. Please see specific information pulled into the AVS if appropriate.

## 2022-09-14 ENCOUNTER — HOSPITAL ENCOUNTER (OUTPATIENT)
Dept: ULTRASOUND IMAGING | Facility: HOSPITAL | Age: 37
Discharge: HOME | End: 2022-09-14
Admitting: FAMILY MEDICINE

## 2022-09-14 DIAGNOSIS — N50.89 LUMP IN TESTIS: ICD-10-CM

## 2022-09-14 DIAGNOSIS — N50.82 SCROTAL PAIN: ICD-10-CM

## 2022-09-14 PROCEDURE — 76870 US EXAM SCROTUM: CPT

## 2022-09-14 PROCEDURE — 93976 VASCULAR STUDY: CPT

## 2022-09-20 ENCOUNTER — TELEPHONE (OUTPATIENT)
Dept: FAMILY MEDICINE CLINIC | Facility: CLINIC | Age: 37
End: 2022-09-20

## 2022-09-20 DIAGNOSIS — N50.82 SCROTAL PAIN: Primary | ICD-10-CM

## 2022-09-20 NOTE — TELEPHONE ENCOUNTER
PT IS WANTING TO GET A REFERRAL TO [facility]. HE STATED HE HAS SEEN YOU IN THE PAST FOR THIS PROBLEM.

## 2023-01-13 RX ORDER — ATENOLOL 25 MG/1
TABLET ORAL
Qty: 90 TABLET | Refills: 2 | Status: SHIPPED | OUTPATIENT
Start: 2023-01-13

## 2023-02-06 ENCOUNTER — OFFICE VISIT (OUTPATIENT)
Dept: FAMILY MEDICINE CLINIC | Facility: CLINIC | Age: 38
End: 2023-02-06
Payer: MEDICAID

## 2023-02-06 ENCOUNTER — HOSPITAL ENCOUNTER (OUTPATIENT)
Dept: GENERAL RADIOLOGY | Facility: HOSPITAL | Age: 38
Discharge: HOME | End: 2023-02-06
Admitting: FAMILY MEDICINE
Payer: MEDICAID

## 2023-02-06 VITALS
DIASTOLIC BLOOD PRESSURE: 81 MMHG | BODY MASS INDEX: 30.48 KG/M2 | HEIGHT: 72 IN | SYSTOLIC BLOOD PRESSURE: 132 MMHG | TEMPERATURE: 98.9 F | OXYGEN SATURATION: 95 % | HEART RATE: 103 BPM | WEIGHT: 225 LBS
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
## 2023-02-06 DIAGNOSIS — M25.521 RIGHT ELBOW PAIN: Primary | ICD-10-CM

## 2023-02-06 DIAGNOSIS — M25.521 RIGHT ELBOW PAIN: ICD-10-CM

## 2023-02-06 PROCEDURE — 99213 OFFICE O/P EST LOW 20 MIN: CPT | Performed by: FAMILY MEDICINE

## 2023-02-06 PROCEDURE — 73080 X-RAY EXAM OF ELBOW: CPT

## 2023-02-06 NOTE — PROGRESS NOTES
"Chief Complaint  Fall (02-02-23- slipped on a piece of ice ), Shoulder Pain (Rt- Rt elbow pain as well ), and Neck Pain    Subjective        Davian Arevalo presents to [facility]  History of Present Illness  Slipped on an icy step while walking down some steps.  He landed sitting down.  No head injury.  No LOC.  Hit his right elbow and right hip.  Right posterior hip area is bruised.   Fall  The accident occurred 3 to 5 days ago. The fall occurred while walking. He fell from a height of 1 to 2 ft. The point of impact was the right hip and right elbow. The pain is present in the right elbow and right hip. The pain is moderate. The symptoms are aggravated by pressure on injury. Pertinent negatives include no fever. He has tried nothing for the symptoms.   Neck Pain   This is a chronic problem. Pertinent negatives include no fever.       Objective   Vital Signs:  /81 (BP Location: Left arm, Patient Position: Sitting, Cuff Size: Adult)   Pulse 103   Temp 98.9 °F (37.2 °C) (Infrared)   Ht 182.9 cm (72\")   Wt 102 kg (225 lb)   SpO2 95%   BMI 30.52 kg/m²   Estimated body mass index is 30.52 kg/m² as calculated from the following:    Height as of this encounter: 182.9 cm (72\").    Weight as of this encounter: 102 kg (225 lb).       BMI is >= 30 and <35. (Class 1 Obesity). The following options were offered after discussion;: exercise counseling/recommendations      Physical Exam  Vitals and nursing note reviewed.   Constitutional:       General: He is not in acute distress.     Appearance: He is well-developed.   HENT:      Head: Normocephalic.   Eyes:      General: Lids are normal.   Neck:      Thyroid: No thyroid mass or thyromegaly.      Trachea: Trachea normal.   Cardiovascular:      Rate and Rhythm: Normal rate and regular rhythm.      Heart sounds: Normal heart sounds.   Pulmonary:      Effort: Pulmonary effort is normal.      Breath sounds: Normal breath sounds. "   Musculoskeletal:      Right elbow: Tenderness present.      Cervical back: Normal range of motion.   Lymphadenopathy:      Cervical: No cervical adenopathy.   Skin:     General: Skin is warm and dry.      Findings: Abrasion and ecchymosis present.      Comments: Left palm abrasion, right hip bruising   Neurological:      Mental Status: He is alert and oriented to person, place, and time.   Psychiatric:         Attention and Perception: He is attentive.         Mood and Affect: Mood normal.         Speech: Speech normal.         Behavior: Behavior normal.        Result Review :  The following data was reviewed by: Merlene Zamorano MD on 02/06/2023:                   Assessment and Plan   Diagnoses and all orders for this visit:    1. Right elbow pain (Primary)  Assessment & Plan:  Apply a compressive ACE bandage. Rest and elevate the affected painful area.  Apply cold compresses intermittently as needed.  As pain recedes, begin normal activities slowly as tolerated.  Call if symptoms persist.      Orders:  -     XR Elbow 3+ View Right; Future    2. Fall due to slipping on ice or snow, initial encounter           Follow Up   No follow-ups on file.  Patient was given instructions and counseling regarding his condition or for health maintenance advice. Please see specific information pulled into the AVS if appropriate.

## 2023-02-07 ENCOUNTER — TELEPHONE (OUTPATIENT)
Dept: FAMILY MEDICINE CLINIC | Facility: CLINIC | Age: 38
End: 2023-02-07

## 2023-02-07 NOTE — TELEPHONE ENCOUNTER
PATIENT CALLED AND STATES SOMEONE CALLED IN REGARDS TO AN ELBOW XRAY. HIS ANSWERING MACHINE CUT THE PERSON OFF.     PLEASE CALL [phone]

## 2023-04-14 ENCOUNTER — LAB (OUTPATIENT)
Dept: FAMILY MEDICINE CLINIC | Facility: CLINIC | Age: 38
End: 2023-04-14
Payer: MEDICAID

## 2023-04-14 ENCOUNTER — OFFICE VISIT (OUTPATIENT)
Dept: FAMILY MEDICINE CLINIC | Facility: CLINIC | Age: 38
End: 2023-04-14
Payer: MEDICAID

## 2023-04-14 VITALS
SYSTOLIC BLOOD PRESSURE: 103 MMHG | DIASTOLIC BLOOD PRESSURE: 72 MMHG | BODY MASS INDEX: 29.8 KG/M2 | HEART RATE: 63 BPM | OXYGEN SATURATION: 97 % | TEMPERATURE: 98.6 F | WEIGHT: 220 LBS | HEIGHT: 72 IN

## 2023-04-14 DIAGNOSIS — R53.83 FATIGUE, UNSPECIFIED TYPE: ICD-10-CM

## 2023-04-14 DIAGNOSIS — H66.002 NON-RECURRENT ACUTE SUPPURATIVE OTITIS MEDIA OF LEFT EAR WITHOUT SPONTANEOUS RUPTURE OF TYMPANIC MEMBRANE: Primary | ICD-10-CM

## 2023-04-14 DIAGNOSIS — R00.2 PALPITATION: ICD-10-CM

## 2023-04-14 LAB
ALBUMIN SERPL-MCNC: 4.7 G/DL (ref 3.5–5.2)
ALBUMIN/GLOB SERPL: 1.6 G/DL
ALP SERPL-CCNC: 75 U/L (ref 39–117)
ALT SERPL W P-5'-P-CCNC: 39 U/L (ref 1–41)
ANION GAP SERPL CALCULATED.3IONS-SCNC: 10.3 MMOL/L (ref 5–15)
AST SERPL-CCNC: 23 U/L (ref 1–40)
BASOPHILS # BLD AUTO: 0.04 10*3/MM3 (ref 0–0.2)
BASOPHILS NFR BLD AUTO: 0.5 % (ref 0–1.5)
BILIRUB SERPL-MCNC: 0.7 MG/DL (ref 0–1.2)
BUN SERPL-MCNC: 19 MG/DL (ref 6–20)
BUN/CREAT SERPL: 19 (ref 7–25)
CALCIUM SPEC-SCNC: 10.2 MG/DL (ref 8.6–10.5)
CHLORIDE SERPL-SCNC: 101 MMOL/L (ref 98–107)
CO2 SERPL-SCNC: 26.7 MMOL/L (ref 22–29)
CREAT SERPL-MCNC: 1 MG/DL (ref 0.76–1.27)
DEPRECATED RDW RBC AUTO: 39.1 FL (ref 37–54)
EGFRCR SERPLBLD CKD-EPI 2021: 98.2 ML/MIN/1.73
EOSINOPHIL # BLD AUTO: 0.23 10*3/MM3 (ref 0–0.4)
EOSINOPHIL NFR BLD AUTO: 3 % (ref 0.3–6.2)
ERYTHROCYTE [DISTWIDTH] IN BLOOD BY AUTOMATED COUNT: 11.9 % (ref 12.3–15.4)
GLOBULIN UR ELPH-MCNC: 2.9 GM/DL
GLUCOSE SERPL-MCNC: 85 MG/DL (ref 65–99)
HCT VFR BLD AUTO: 48.3 % (ref 37.5–51)
HGB BLD-MCNC: 16.6 G/DL (ref 13–17.7)
IMM GRANULOCYTES # BLD AUTO: 0.02 10*3/MM3 (ref 0–0.05)
IMM GRANULOCYTES NFR BLD AUTO: 0.3 % (ref 0–0.5)
LYMPHOCYTES # BLD AUTO: 1.79 10*3/MM3 (ref 0.7–3.1)
LYMPHOCYTES NFR BLD AUTO: 23.6 % (ref 19.6–45.3)
MCH RBC QN AUTO: 30.3 PG (ref 26.6–33)
MCHC RBC AUTO-ENTMCNC: 34.4 G/DL (ref 31.5–35.7)
MCV RBC AUTO: 88.3 FL (ref 79–97)
MONOCYTES # BLD AUTO: 0.65 10*3/MM3 (ref 0.1–0.9)
MONOCYTES NFR BLD AUTO: 8.6 % (ref 5–12)
NEUTROPHILS NFR BLD AUTO: 4.85 10*3/MM3 (ref 1.7–7)
NEUTROPHILS NFR BLD AUTO: 64 % (ref 42.7–76)
NRBC BLD AUTO-RTO: 0 /100 WBC (ref 0–0.2)
PLATELET # BLD AUTO: 370 10*3/MM3 (ref 140–450)
PMV BLD AUTO: 9.4 FL (ref 6–12)
POTASSIUM SERPL-SCNC: 4.6 MMOL/L (ref 3.5–5.2)
PROT SERPL-MCNC: 7.6 G/DL (ref 6–8.5)
RBC # BLD AUTO: 5.47 10*6/MM3 (ref 4.14–5.8)
SODIUM SERPL-SCNC: 138 MMOL/L (ref 136–145)
TSH SERPL DL<=0.05 MIU/L-ACNC: 1.35 UIU/ML (ref 0.27–4.2)
WBC NRBC COR # BLD: 7.58 10*3/MM3 (ref 3.4–10.8)

## 2023-04-14 PROCEDURE — 80050 GENERAL HEALTH PANEL: CPT | Performed by: FAMILY MEDICINE

## 2023-04-14 PROCEDURE — 99213 OFFICE O/P EST LOW 20 MIN: CPT | Performed by: FAMILY MEDICINE

## 2023-04-14 PROCEDURE — 36415 COLL VENOUS BLD VENIPUNCTURE: CPT | Performed by: FAMILY MEDICINE

## 2023-04-14 PROCEDURE — 3074F SYST BP LT 130 MM HG: CPT | Performed by: FAMILY MEDICINE

## 2023-04-14 PROCEDURE — 3078F DIAST BP <80 MM HG: CPT | Performed by: FAMILY MEDICINE

## 2023-04-14 RX ORDER — AZITHROMYCIN 250 MG/1
TABLET, FILM COATED ORAL
Qty: 6 TABLET | Refills: 0 | Status: SHIPPED | OUTPATIENT
Start: 2023-04-14

## 2023-04-14 NOTE — PROGRESS NOTES
"Chief Complaint  Jaw Pain (X 4 days, pain comes and goes and pain is sporadic- LT side )    Subjective        Davian Arevalo presents to [facility]  Jaw Pain  This is a new problem. The current episode started in the past 7 days. The problem occurs constantly. The problem has been unchanged. Associated symptoms include swollen glands. Pertinent negatives include no chest pain, chills, congestion, coughing, diaphoresis, fever, nausea, rash or sore throat. Nothing aggravates the symptoms. He has tried nothing for the symptoms.   Palpitations   This is a new problem. The current episode started in the past 7 days. The problem occurs rarely. The problem has been waxing and waning. Nothing aggravates the symptoms. Pertinent negatives include no chest pain, coughing, diaphoresis, fever, malaise/fatigue, nausea, shortness of breath or syncope.       Objective   Vital Signs:  /72 (BP Location: Left arm, Patient Position: Sitting, Cuff Size: Large Adult)   Pulse 63   Temp 98.6 °F (37 °C) (Infrared)   Ht 182.9 cm (72\")   Wt 99.8 kg (220 lb)   SpO2 97%   BMI 29.84 kg/m²   Estimated body mass index is 29.84 kg/m² as calculated from the following:    Height as of this encounter: 182.9 cm (72\").    Weight as of this encounter: 99.8 kg (220 lb).             Physical Exam  Vitals and nursing note reviewed.   Constitutional:       General: He is not in acute distress.     Appearance: He is well-developed.   HENT:      Head: Normocephalic.      Left Ear: Tympanic membrane is injected.   Eyes:      General: Lids are normal.      Conjunctiva/sclera: Conjunctivae normal.   Neck:      Thyroid: No thyroid mass or thyromegaly.      Trachea: Trachea normal.   Cardiovascular:      Rate and Rhythm: Normal rate and regular rhythm.      Heart sounds: Normal heart sounds.   Pulmonary:      Effort: Pulmonary effort is normal.      Breath sounds: Normal breath sounds.   Musculoskeletal:      " Cervical back: Normal range of motion.   Lymphadenopathy:      Cervical: No cervical adenopathy.   Skin:     General: Skin is warm and dry.   Neurological:      Mental Status: He is alert and oriented to person, place, and time.   Psychiatric:         Attention and Perception: He is attentive.         Mood and Affect: Mood normal.         Speech: Speech normal.         Behavior: Behavior normal.        Result Review :  The following data was reviewed by: Merlene Zamorano MD on 04/14/2023:  Common labs        4/14/2023    11:06   Common Labs   Glucose 85     BUN 19     Creatinine 1.00     Sodium 138     Potassium 4.6     Chloride 101     Calcium 10.2     Albumin 4.7     Total Bilirubin 0.7     Alkaline Phosphatase 75     AST (SGOT) 23     ALT (SGPT) 39     WBC 7.58     Hemoglobin 16.6     Hematocrit 48.3     Platelets 370                    Assessment and Plan   Diagnoses and all orders for this visit:    1. Non-recurrent acute suppurative otitis media of left ear without spontaneous rupture of tympanic membrane (Primary)  -     azithromycin (Zithromax Z-Savage) 250 MG tablet; Take 2 tablets the first day, then 1 tablet daily for 4 days.  Dispense: 6 tablet; Refill: 0    2. Palpitation  -     CBC & Differential  -     Comprehensive Metabolic Panel  -     TSH    3. Fatigue, unspecified type  -     CBC & Differential  -     Comprehensive Metabolic Panel  -     TSH             Follow Up   No follow-ups on file.  Patient was given instructions and counseling regarding his condition or for health maintenance advice. Please see specific information pulled into the AVS if appropriate.

## 2023-04-25 ENCOUNTER — TELEPHONE (OUTPATIENT)
Dept: FAMILY MEDICINE CLINIC | Facility: CLINIC | Age: 38
End: 2023-04-25

## 2023-04-25 ENCOUNTER — TELEPHONE (OUTPATIENT)
Dept: FAMILY MEDICINE CLINIC | Facility: CLINIC | Age: 38
End: 2023-04-25
Payer: MEDICAID

## 2023-04-25 RX ORDER — PREDNISONE 10 MG/1
10 TABLET ORAL 2 TIMES DAILY
Qty: 10 TABLET | Refills: 0 | Status: SHIPPED | OUTPATIENT
Start: 2023-04-25

## 2023-04-25 NOTE — TELEPHONE ENCOUNTER
Caller: MickeyDavian ROSELINE    Relationship: Self    Best call back number:[phone]     What is the best time to reach you: AFTERNOON     Who are you requesting to speak with (clinical staff, provider,  specific staff member): CLINICAL       Do you know the name of the person who called: DAVIAN    What was the call regarding:DAVIAN IS STILL HAVING LEFT EAR AND JAW PAIN. WOULD LIKE ADVICE FROM DR TANNER    Do you require a callback: YES

## 2023-04-25 NOTE — TELEPHONE ENCOUNTER
Caller: Davian Arevalo    Relationship: Self    Best call back number: [phone]     What is the best time to reach you:   AFTERNOONS     Who are you requesting to speak with (clinical staff, provider,  specific staff member): CLINICAL     Do you know the name of the person who called: DAVIAN  What was the call regarding: DAVIAN SAYS HE IS STILL HAVING LEFT EAR, JAW PAIN . PLEASE ADVISE     Do you require a callback:YES

## 2023-04-25 NOTE — TELEPHONE ENCOUNTER
I recommend that he try a steroid for a few days to relieve the pain.  I will send in a prescription.

## 2023-04-26 ENCOUNTER — OFFICE VISIT (OUTPATIENT)
Dept: CARDIOLOGY | Facility: CLINIC | Age: 38
End: 2023-04-26
Payer: MEDICAID

## 2023-04-26 VITALS
DIASTOLIC BLOOD PRESSURE: 68 MMHG | HEIGHT: 72 IN | OXYGEN SATURATION: 100 % | SYSTOLIC BLOOD PRESSURE: 102 MMHG | BODY MASS INDEX: 29.53 KG/M2 | HEART RATE: 70 BPM | WEIGHT: 218 LBS

## 2023-04-26 DIAGNOSIS — R00.2 PALPITATIONS: ICD-10-CM

## 2023-04-26 DIAGNOSIS — I10 ESSENTIAL HYPERTENSION: Primary | ICD-10-CM

## 2023-04-26 DIAGNOSIS — R00.0 TACHYCARDIA: ICD-10-CM

## 2023-04-26 PROCEDURE — 1159F MED LIST DOCD IN RCRD: CPT | Performed by: INTERNAL MEDICINE

## 2023-04-26 PROCEDURE — 3074F SYST BP LT 130 MM HG: CPT | Performed by: INTERNAL MEDICINE

## 2023-04-26 PROCEDURE — 3078F DIAST BP <80 MM HG: CPT | Performed by: INTERNAL MEDICINE

## 2023-04-26 PROCEDURE — 1160F RVW MEDS BY RX/DR IN RCRD: CPT | Performed by: INTERNAL MEDICINE

## 2023-04-26 PROCEDURE — 99213 OFFICE O/P EST LOW 20 MIN: CPT | Performed by: INTERNAL MEDICINE

## 2023-04-26 NOTE — PROGRESS NOTES
"    Subjective:     Encounter Date:04/26/2023      Patient ID: Davian Arevalo is a 37 y.o. male.    Chief Complaint:  History of Present Illness 37-year-old white male with history of hypertension and history of palpitations with arrhythmia presents to my office for follow-up.  Patient is currently stable without any symptoms of chest pain but has some shortness of breath with exertion.  No complains any PND orthopnea.  No palpitation dizziness syncope or swelling of the feet.  Patient has been taking all her medicines regularly.  Patient does not smoke.  He is quite active    The following portions of the patient's history were reviewed and updated as appropriate: allergies, current medications, past family history, past medical history, past social history, past surgical history and problem list.  Past Medical History:   Diagnosis Date   • Abdominal pain, chronic, right lower quadrant 01/30/2015   • Anxiety    • Autism 08/13/2014    m[facility] ( [facility]   • Carpal tunnel syndrome 02/22/2018   • Constipation 09/21/2018   • Depression    • Dyslexia 12/26/2017   • Hypertension    • IBS (irritable bowel syndrome) 09/21/2018   • IBS (irritable bowel syndrome)    • Knee pain, left 07/25/2017   • Lower back pain 01/30/2015   • Right cervical radiculopathy 01/07/2019   • Shoulder pain, right 02/14/2017   • Tinnitus    • Vitamin D deficiency 12/26/2017     Past Surgical History:   Procedure Laterality Date   • ADENOIDECTOMY     • COLONOSCOPY     • CYSTOSCOPY, URETEROSCOPY, RETROGRADE PYELOGRAM, STENT INSERTION Right 12/15/2020    Procedure: CYSTOSCOPY URETEROSCOPY STONE BASKET EXTRACTION AND STENT INSERTION;  Surgeon: Gerry Giron MD;  Location: [facility] MAIN OR;  Service: Urology;  Laterality: Right;   • TONSILLECTOMY  1993    Tonsillectomy and Adenoids-   • TONSILLECTOMY AND ADENOIDECTOMY       /68   Pulse 70   Ht 182.9 cm (72.01\")   Wt 98.9 kg (218 lb)   SpO2 100%   BMI 29.56 kg/m² "   Family History   Problem Relation Age of Onset   • Cancer Mother    • Hypertension Mother    • Atrial fibrillation Mother    • Cancer Father    • Hypertension Father    • Stroke Father    • Hyperlipidemia Father    • Diabetes Paternal Uncle    • Diabetes Maternal Grandmother    • Stroke Maternal Grandfather    • Diabetes Maternal Grandfather    • Stroke Other        Current Outpatient Medications:   •  atenolol (TENORMIN) 25 MG tablet, TAKE ONE TABLET BY MOUTH ONCE NIGHTLY, Disp: 90 tablet, Rfl: 2  •  azithromycin (Zithromax Z-Savage) 250 MG tablet, Take 2 tablets the first day, then 1 tablet daily for 4 days., Disp: 6 tablet, Rfl: 0  •  cholecalciferol (VITAMIN D3) 1000 units tablet, Take 1 tablet by mouth Daily., Disp: , Rfl:   •  dicyclomine (BENTYL) 20 MG tablet, Take 1 tablet by mouth 3 (Three) Times a Day., Disp: , Rfl:   •  folic acid-vit B6-vit B12 (FOLBEE) 2.5-25-1 MG tablet tablet, Take  by mouth Daily., Disp: , Rfl:   •  hydrocortisone 2.5 % cream, Apply 1 application topically to the appropriate area as directed 2 (Two) Times a Day., Disp: 28 g, Rfl: 1  •  lisinopril (PRINIVIL,ZESTRIL) 20 MG tablet, TAKE ONE TABLET BY MOUTH DAILY, Disp: 90 tablet, Rfl: 3  •  nortriptyline (PAMELOR) 50 MG capsule, Take 2 capsules by mouth Every Night., Disp: , Rfl:   •  predniSONE (DELTASONE) 10 MG tablet, Take 1 tablet by mouth 2 (Two) Times a Day., Disp: 10 tablet, Rfl: 0  •  vitamin B-12 (CYANOCOBALAMIN) 1000 MCG tablet, Take 1 tablet by mouth Daily., Disp: , Rfl:   Allergies   Allergen Reactions   • Ceclor [Cefaclor] Hives   • Amoxicillin Hives   • Cephalexin Hives     Tolerated CTX 12/2020 admission   • Cloral Hives     Social History     Socioeconomic History   • Marital status: Single   Tobacco Use   • Smoking status: Never   • Smokeless tobacco: Never   Substance and Sexual Activity   • Alcohol use: Never   • Drug use: No     Review of Systems   Constitutional: Positive for malaise/fatigue.   Cardiovascular:  Negative for chest pain, dyspnea on exertion, leg swelling and palpitations.   Respiratory: Positive for shortness of breath. Negative for cough.    Gastrointestinal: Negative for abdominal pain, nausea and vomiting.   Neurological: Negative for dizziness, focal weakness, headaches, light-headedness and numbness.   All other systems reviewed and are negative.             Objective:     Constitutional:       Appearance: Well-developed.   Eyes:      General: No scleral icterus.     Conjunctiva/sclera: Conjunctivae normal.      Pupils: Pupils are equal, round, and reactive to light.   HENT:      Head: Normocephalic and atraumatic.   Neck:      Vascular: No carotid bruit or JVD.   Pulmonary:      Effort: Pulmonary effort is normal.      Breath sounds: Normal breath sounds. No wheezing. No rales.   Cardiovascular:      Normal rate. Regular rhythm.   Pulses:     Intact distal pulses.   Abdominal:      General: Bowel sounds are normal.      Palpations: Abdomen is soft.   Musculoskeletal: Normal range of motion.      Cervical back: Normal range of motion and neck supple. Skin:     General: Skin is warm and dry.      Findings: No rash.   Neurological:      Mental Status: Alert.      Comments: No focal deficits       Procedures    Lab Review:         MDM  1.  Palpitations and shortness of breath  Patient had history of palpitations and had sinus tachycardia and is currently on beta-blockers and stable and his heart rate has been stable  2.  Hypertension  Patient blood pressure is actually on the lower side and is on lisinopril and atenolol and followed by the primary care doctor.      Patient's previous medical records, labs, and EKG were reviewed and discussed with the patient at today's visit.

## 2023-04-30 PROBLEM — R00.2 PALPITATION: Status: ACTIVE | Noted: 2023-04-30

## 2023-05-05 ENCOUNTER — OFFICE VISIT (OUTPATIENT)
Dept: FAMILY MEDICINE CLINIC | Facility: CLINIC | Age: 38
End: 2023-05-05
Payer: MEDICAID

## 2023-05-05 VITALS
TEMPERATURE: 98.2 F | HEIGHT: 72 IN | SYSTOLIC BLOOD PRESSURE: 122 MMHG | WEIGHT: 219.6 LBS | HEART RATE: 74 BPM | DIASTOLIC BLOOD PRESSURE: 80 MMHG | BODY MASS INDEX: 29.74 KG/M2 | OXYGEN SATURATION: 97 %

## 2023-05-05 DIAGNOSIS — H66.002 NON-RECURRENT ACUTE SUPPURATIVE OTITIS MEDIA OF LEFT EAR WITHOUT SPONTANEOUS RUPTURE OF TYMPANIC MEMBRANE: Primary | ICD-10-CM

## 2023-05-05 PROCEDURE — 3074F SYST BP LT 130 MM HG: CPT | Performed by: FAMILY MEDICINE

## 2023-05-05 PROCEDURE — 1160F RVW MEDS BY RX/DR IN RCRD: CPT | Performed by: FAMILY MEDICINE

## 2023-05-05 PROCEDURE — 3079F DIAST BP 80-89 MM HG: CPT | Performed by: FAMILY MEDICINE

## 2023-05-05 PROCEDURE — 99213 OFFICE O/P EST LOW 20 MIN: CPT | Performed by: FAMILY MEDICINE

## 2023-05-05 PROCEDURE — 1159F MED LIST DOCD IN RCRD: CPT | Performed by: FAMILY MEDICINE

## 2023-05-05 RX ORDER — SULFAMETHOXAZOLE AND TRIMETHOPRIM 800; 160 MG/1; MG/1
1 TABLET ORAL 2 TIMES DAILY
Qty: 20 TABLET | Refills: 0 | Status: SHIPPED | OUTPATIENT
Start: 2023-05-05

## 2023-05-05 NOTE — PROGRESS NOTES
"Chief Complaint  Jaw Pain (X 2 weeks- pain comes and goes- dull ), Dizziness (Lightheaded- on and off for days ), and Nausea (On and off for days )    Subjective        Davian Arevalo presents to [facility]  History of Present Illness  Lightheaded today.  Swollen lymph nodes in his neck.     Dizziness  This is a new problem. The current episode started yesterday. The problem occurs daily. The problem has been waxing and waning. Associated symptoms include nausea. Pertinent negatives include no chest pain, chills, congestion, coughing or fever.   Nausea  Associated symptoms include nausea. Pertinent negatives include no chest pain, chills, congestion, coughing or fever.       Objective   Vital Signs:  /80 (BP Location: Left arm, Patient Position: Sitting, Cuff Size: Adult)   Pulse 74   Temp 98.2 °F (36.8 °C) (Infrared)   Ht 182.9 cm (72.01\")   Wt 99.6 kg (219 lb 9.6 oz)   SpO2 97%   BMI 29.77 kg/m²   Estimated body mass index is 29.77 kg/m² as calculated from the following:    Height as of this encounter: 182.9 cm (72.01\").    Weight as of this encounter: 99.6 kg (219 lb 9.6 oz).             Physical Exam  Vitals and nursing note reviewed.   Constitutional:       General: He is not in acute distress.     Appearance: He is well-developed.   HENT:      Head: Normocephalic.      Left Ear: Tympanic membrane is injected.   Eyes:      General: Lids are normal.      Conjunctiva/sclera: Conjunctivae normal.   Neck:      Thyroid: No thyroid mass or thyromegaly.      Trachea: Trachea normal.   Cardiovascular:      Rate and Rhythm: Normal rate and regular rhythm.      Heart sounds: Normal heart sounds.   Pulmonary:      Effort: Pulmonary effort is normal.      Breath sounds: Normal breath sounds.   Musculoskeletal:      Cervical back: Normal range of motion.   Lymphadenopathy:      Cervical: No cervical adenopathy.   Skin:     General: Skin is warm and dry.   Neurological:      " Mental Status: He is alert and oriented to person, place, and time.   Psychiatric:         Attention and Perception: He is attentive.         Mood and Affect: Mood normal.         Speech: Speech normal.         Behavior: Behavior normal.        Result Review :  The following data was reviewed by: Merlene Zamorano MD on 05/05/2023:  Common labs        4/14/2023    11:06   Common Labs   Glucose 85     BUN 19     Creatinine 1.00     Sodium 138     Potassium 4.6     Chloride 101     Calcium 10.2     Albumin 4.7     Total Bilirubin 0.7     Alkaline Phosphatase 75     AST (SGOT) 23     ALT (SGPT) 39     WBC 7.58     Hemoglobin 16.6     Hematocrit 48.3     Platelets 370                    Assessment and Plan   Diagnoses and all orders for this visit:    1. Non-recurrent acute suppurative otitis media of left ear without spontaneous rupture of tympanic membrane (Primary)  -     sulfamethoxazole-trimethoprim (Bactrim DS) 800-160 MG per tablet; Take 1 tablet by mouth 2 (Two) Times a Day.  Dispense: 20 tablet; Refill: 0             Follow Up   No follow-ups on file.  Patient was given instructions and counseling regarding his condition or for health maintenance advice. Please see specific information pulled into the AVS if appropriate.

## 2023-06-12 ENCOUNTER — LAB (OUTPATIENT)
Dept: FAMILY MEDICINE CLINIC | Facility: CLINIC | Age: 38
End: 2023-06-12
Payer: MEDICAID

## 2023-06-12 PROCEDURE — 80053 COMPREHEN METABOLIC PANEL: CPT | Performed by: FAMILY MEDICINE

## 2023-06-12 PROCEDURE — 80061 LIPID PANEL: CPT | Performed by: FAMILY MEDICINE

## 2023-06-19 RX ORDER — LISINOPRIL 20 MG/1
TABLET ORAL
Qty: 90 TABLET | Refills: 3 | Status: SHIPPED | OUTPATIENT
Start: 2023-06-19

## 2023-07-18 ENCOUNTER — HOSPITAL ENCOUNTER (EMERGENCY)
Facility: HOSPITAL | Age: 38
Discharge: HOME | End: 2023-07-18
Attending: EMERGENCY MEDICINE | Admitting: EMERGENCY MEDICINE
Payer: MEDICAID

## 2023-07-18 ENCOUNTER — APPOINTMENT (OUTPATIENT)
Dept: GENERAL RADIOLOGY | Facility: HOSPITAL | Age: 38
End: 2023-07-18
Payer: MEDICAID

## 2023-07-18 VITALS
BODY MASS INDEX: 29.62 KG/M2 | RESPIRATION RATE: 20 BRPM | OXYGEN SATURATION: 99 % | HEART RATE: 84 BPM | SYSTOLIC BLOOD PRESSURE: 108 MMHG | TEMPERATURE: 97.4 F | DIASTOLIC BLOOD PRESSURE: 70 MMHG | WEIGHT: 218.7 LBS | HEIGHT: 72 IN

## 2023-07-18 DIAGNOSIS — T18.9XXA SWALLOWED FOREIGN BODY, INITIAL ENCOUNTER: Primary | ICD-10-CM

## 2023-07-18 PROCEDURE — 99282 EMERGENCY DEPT VISIT SF MDM: CPT

## 2023-07-18 PROCEDURE — 70360 X-RAY EXAM OF NECK: CPT

## 2023-07-18 PROCEDURE — 71045 X-RAY EXAM CHEST 1 VIEW: CPT

## 2023-07-18 NOTE — ED PROVIDER NOTES
Subjective     Provider in Triage Note  Patient is a 37-year-old white male who presents today with concern that he may have a piece of metal stuck in his throat.  He states he was using his teeth pulled on a piece of wire.  He states he took a breath at the same time felt the metal struck the back of his throat.  States it feels like it is still stuck in his throat.  No difficulty breathing or swallowing.    History of Present Illness  Agree with pit note except patient describes the incident differently to me stating that he was not pulling on an object with his teeth he actually was pulling on it with his hands and states something pulled apart in his hands and some particles flew up and he fell like he breathed then a small particle into the back of his throat.  He states he then swallowed.  He reports no ongoing shortness of breath or any ingestion or elation of any large metal objects.  Review of Systems    Past Medical History:   Diagnosis Date    Abdominal pain, chronic, right lower quadrant 01/30/2015    Anxiety     Autism 08/13/2014    mild ( Abstracted from [facility]    Carpal tunnel syndrome 02/22/2018    Constipation 09/21/2018    Depression     Dyslexia 12/26/2017    Hypertension     IBS (irritable bowel syndrome) 09/21/2018    IBS (irritable bowel syndrome)     Knee pain, left 07/25/2017    Lower back pain 01/30/2015    Right cervical radiculopathy 01/07/2019    Shoulder pain, right 02/14/2017    Tinnitus     Vitamin D deficiency 12/26/2017       Allergies   Allergen Reactions    Ceclor [Cefaclor] Hives    Amoxicillin Hives    Cephalexin Hives     Tolerated CTX 12/2020 admission    Cloral Hives       Past Surgical History:   Procedure Laterality Date    ADENOIDECTOMY      COLONOSCOPY      CYSTOSCOPY, URETEROSCOPY, RETROGRADE PYELOGRAM, STENT INSERTION Right 12/15/2020    Procedure: CYSTOSCOPY URETEROSCOPY STONE BASKET EXTRACTION AND STENT INSERTION;  Surgeon: Gerry Giron MD;  Location: [facility]  MAIN OR;  Service: Urology;  Laterality: Right;    TONSILLECTOMY  1993    Tonsillectomy and Adenoids-    TONSILLECTOMY AND ADENOIDECTOMY         Family History   Problem Relation Age of Onset    Cancer Mother     Hypertension Mother     Atrial fibrillation Mother     Cancer Father     Hypertension Father     Stroke Father     Hyperlipidemia Father     Diabetes Paternal Uncle     Diabetes Maternal Grandmother     Stroke Maternal Grandfather     Diabetes Maternal Grandfather     Stroke Other        Social History     Socioeconomic History    Marital status: Single   Tobacco Use    Smoking status: Never    Smokeless tobacco: Never   Substance and Sexual Activity    Alcohol use: Never    Drug use: No     Prior to Admission medications    Medication Sig Start Date End Date Taking? Authorizing Provider   atenolol (TENORMIN) 25 MG tablet TAKE ONE TABLET BY MOUTH ONCE NIGHTLY 1/13/23   Merlene Zamorano MD   azelastine (OPTIVAR) 0.05 % ophthalmic solution Administer 1 drop to both eyes Every 12 (Twelve) Hours. 6/8/23   Isabella Merrill MD   chlorhexidine (PERIDEX) 0.12 % solution Apply 0.12 mL to the mouth or throat 2 (Two) Times a Day. 5/10/23   Isabella Merrill MD   cholecalciferol (VITAMIN D3) 1000 units tablet Take 1 tablet by mouth Daily. 12/26/17   Merlene Zamorano MD   dicyclomine (BENTYL) 20 MG tablet Take 1 tablet by mouth 3 (Three) Times a Day. 8/13/14   Merlene Zamorano MD   folic acid-vit B6-vit B12 (FOLBEE) 2.5-25-1 MG tablet tablet Take  by mouth Daily.    Isabella Merrill MD   hydrocortisone 2.5 % cream Apply 1 application topically to the appropriate area as directed 2 (Two) Times a Day. 11/23/21   Merlene Zamorano MD   lisinopril (PRINIVIL,ZESTRIL) 20 MG tablet TAKE ONE TABLET BY MOUTH DAILY 6/19/23   Merlene Zamorano MD   mometasone (ELOCON) 0.1 % ointment Apply 1 application topically to the appropriate area as directed Daily. 6/8/23   Isabella Merrill MD   nortriptyline  "(PAMELOR) 50 MG capsule Take 2 capsules by mouth Every Night. 8/13/14   Merlene Zamorano MD   vitamin B-12 (CYANOCOBALAMIN) 1000 MCG tablet Take 1 tablet by mouth Daily. 4/20/16   Merlene Zamorano MD     /87 (BP Location: Left arm, Patient Position: Sitting)   Pulse 101   Temp 97.4 °F (36.3 °C) (Oral)   Resp 20   Ht 182.9 cm (72\")   Wt 99.2 kg (218 lb 11.1 oz)   SpO2 98%   BMI 29.66 kg/m²         Objective   Physical Exam  General: Well-appearing, no acute distress  Psych: Oriented, pleasant affect  Intraoral exam appears normal there is no apparent foreign bodies there is no signs of trauma, no stridor, neck nontender no soft tissue swelling  Abdomen soft nontender  Respirations: Clear, nonlabored respirations  Skin: No rash, normal color  Procedures           ED Course           XR Neck Soft Tissue    Result Date: 7/18/2023  Impression: Negative Electronically Signed: Shaan Masterson  7/18/2023 6:37 PM EDT  Workstation ID: [id number]    XR Chest 1 View    Result Date: 7/18/2023  Impression: Negative Electronically Signed: Shaan Masterson  7/18/2023 6:37 PM EDT  Workstation ID: [id number]                                   Medical Decision Making  Patient has no signs of any retained foreign bodies he may have swallowed a small particle but this should pass easily as he is not describing any ingestion of any large sharp metal objects or anything that should cause obstruction.  He was given warning signs for return and discharged in good condition.    Problems Addressed:  Swallowed foreign body, initial encounter: complicated acute illness or injury    Amount and/or Complexity of Data Reviewed  Radiology: ordered and independent interpretation performed.     Details: My independent interpretation of the x-ray images of the neck and chest no apparent radiopaque foreign body        Final diagnoses:   Swallowed foreign body, initial encounter       ED Disposition  ED Disposition       ED Disposition   Discharge "    Condition   Stable    Comment   --               Merlene Zamorano MD  [address] [address]  [phone]      As needed         Medication List      No changes were made to your prescriptions during this visit.            Quincy Bhatt MD  07/18/23 0162

## 2023-07-18 NOTE — ED NOTES
Pt. States he had some type of wire in his mouth and he thinks he may have either inhaled and swallowed or or inhaled it. This happened today. States something like this has happened to him before but nothing showed up. Flat affect. C/o soreness in throat. Throat appears normal, not red. No acute distress.

## 2023-08-31 ENCOUNTER — TELEPHONE (OUTPATIENT)
Dept: FAMILY MEDICINE CLINIC | Facility: CLINIC | Age: 38
End: 2023-08-31
Payer: MEDICAID

## 2023-09-01 ENCOUNTER — PATIENT ROUNDING (BHMG ONLY) (OUTPATIENT)
Dept: FAMILY MEDICINE CLINIC | Facility: CLINIC | Age: 38
End: 2023-09-01
Payer: MEDICAID

## 2023-09-01 ENCOUNTER — LAB (OUTPATIENT)
Dept: FAMILY MEDICINE CLINIC | Facility: CLINIC | Age: 38
End: 2023-09-01
Payer: MEDICAID

## 2023-09-01 ENCOUNTER — OFFICE VISIT (OUTPATIENT)
Dept: FAMILY MEDICINE CLINIC | Facility: CLINIC | Age: 38
End: 2023-09-01
Payer: MEDICAID

## 2023-09-01 VITALS
WEIGHT: 217.5 LBS | OXYGEN SATURATION: 96 % | BODY MASS INDEX: 29.46 KG/M2 | SYSTOLIC BLOOD PRESSURE: 117 MMHG | DIASTOLIC BLOOD PRESSURE: 83 MMHG | TEMPERATURE: 97.8 F | HEIGHT: 72 IN | HEART RATE: 68 BPM

## 2023-09-01 DIAGNOSIS — J02.9 SORE THROAT: ICD-10-CM

## 2023-09-01 DIAGNOSIS — R11.0 NAUSEA: ICD-10-CM

## 2023-09-01 DIAGNOSIS — J02.9 SORE THROAT: Primary | ICD-10-CM

## 2023-09-01 DIAGNOSIS — R05.1 ACUTE COUGH: ICD-10-CM

## 2023-09-01 DIAGNOSIS — R59.0 CERVICAL LYMPHADENOPATHY: ICD-10-CM

## 2023-09-01 DIAGNOSIS — H66.003 NON-RECURRENT ACUTE SUPPURATIVE OTITIS MEDIA OF BOTH EARS WITHOUT SPONTANEOUS RUPTURE OF TYMPANIC MEMBRANES: ICD-10-CM

## 2023-09-01 DIAGNOSIS — M54.50 ACUTE MIDLINE LOW BACK PAIN WITHOUT SCIATICA: ICD-10-CM

## 2023-09-01 LAB
ALBUMIN SERPL-MCNC: 4.8 G/DL (ref 3.5–5.2)
ALBUMIN/GLOB SERPL: 1.8 G/DL
ALP SERPL-CCNC: 86 U/L (ref 39–117)
ALT SERPL W P-5'-P-CCNC: 57 U/L (ref 1–41)
ANION GAP SERPL CALCULATED.3IONS-SCNC: 12 MMOL/L (ref 5–15)
AST SERPL-CCNC: 31 U/L (ref 1–40)
BASOPHILS # BLD AUTO: 0.07 10*3/MM3 (ref 0–0.2)
BASOPHILS NFR BLD AUTO: 0.8 % (ref 0–1.5)
BILIRUB BLD-MCNC: NEGATIVE MG/DL
BILIRUB SERPL-MCNC: 0.4 MG/DL (ref 0–1.2)
BUN SERPL-MCNC: 14 MG/DL (ref 6–20)
BUN/CREAT SERPL: 14.7 (ref 7–25)
CALCIUM SPEC-SCNC: 9.9 MG/DL (ref 8.6–10.5)
CHLORIDE SERPL-SCNC: 100 MMOL/L (ref 98–107)
CLARITY, POC: CLEAR
CO2 SERPL-SCNC: 25 MMOL/L (ref 22–29)
COLOR UR: ABNORMAL
CREAT SERPL-MCNC: 0.95 MG/DL (ref 0.76–1.27)
DEPRECATED RDW RBC AUTO: 40.5 FL (ref 37–54)
EGFRCR SERPLBLD CKD-EPI 2021: 103.8 ML/MIN/1.73
EOSINOPHIL # BLD AUTO: 0.33 10*3/MM3 (ref 0–0.4)
EOSINOPHIL NFR BLD AUTO: 3.6 % (ref 0.3–6.2)
ERYTHROCYTE [DISTWIDTH] IN BLOOD BY AUTOMATED COUNT: 12.4 % (ref 12.3–15.4)
EXPIRATION DATE: ABNORMAL
GLOBULIN UR ELPH-MCNC: 2.7 GM/DL
GLUCOSE SERPL-MCNC: 98 MG/DL (ref 65–99)
GLUCOSE UR STRIP-MCNC: NEGATIVE MG/DL
HCT VFR BLD AUTO: 49 % (ref 37.5–51)
HGB BLD-MCNC: 16.5 G/DL (ref 13–17.7)
IMM GRANULOCYTES # BLD AUTO: 0.03 10*3/MM3 (ref 0–0.05)
IMM GRANULOCYTES NFR BLD AUTO: 0.3 % (ref 0–0.5)
KETONES UR QL: NEGATIVE
LEUKOCYTE EST, POC: NEGATIVE
LYMPHOCYTES # BLD AUTO: 2.86 10*3/MM3 (ref 0.7–3.1)
LYMPHOCYTES NFR BLD AUTO: 30.8 % (ref 19.6–45.3)
Lab: ABNORMAL
MCH RBC QN AUTO: 30.7 PG (ref 26.6–33)
MCHC RBC AUTO-ENTMCNC: 33.7 G/DL (ref 31.5–35.7)
MCV RBC AUTO: 91.2 FL (ref 79–97)
MONOCYTES # BLD AUTO: 0.79 10*3/MM3 (ref 0.1–0.9)
MONOCYTES NFR BLD AUTO: 8.5 % (ref 5–12)
NEUTROPHILS NFR BLD AUTO: 5.21 10*3/MM3 (ref 1.7–7)
NEUTROPHILS NFR BLD AUTO: 56 % (ref 42.7–76)
NITRITE UR-MCNC: NEGATIVE MG/ML
NRBC BLD AUTO-RTO: 0 /100 WBC (ref 0–0.2)
PH UR: 5 [PH] (ref 5–8)
PLATELET # BLD AUTO: 423 10*3/MM3 (ref 140–450)
PMV BLD AUTO: 9.4 FL (ref 6–12)
POTASSIUM SERPL-SCNC: 4.2 MMOL/L (ref 3.5–5.2)
PROT SERPL-MCNC: 7.5 G/DL (ref 6–8.5)
PROT UR STRIP-MCNC: ABNORMAL MG/DL
RBC # BLD AUTO: 5.37 10*6/MM3 (ref 4.14–5.8)
RBC # UR STRIP: NEGATIVE /UL
SODIUM SERPL-SCNC: 137 MMOL/L (ref 136–145)
SP GR UR: 1.03 (ref 1–1.03)
UROBILINOGEN UR QL: NORMAL
WBC NRBC COR # BLD: 9.29 10*3/MM3 (ref 3.4–10.8)

## 2023-09-01 PROCEDURE — 99214 OFFICE O/P EST MOD 30 MIN: CPT | Performed by: FAMILY MEDICINE

## 2023-09-01 PROCEDURE — 86664 EPSTEIN-BARR NUCLEAR ANTIGEN: CPT | Performed by: FAMILY MEDICINE

## 2023-09-01 PROCEDURE — 85025 COMPLETE CBC W/AUTO DIFF WBC: CPT | Performed by: FAMILY MEDICINE

## 2023-09-01 PROCEDURE — 86665 EPSTEIN-BARR CAPSID VCA: CPT | Performed by: FAMILY MEDICINE

## 2023-09-01 PROCEDURE — 1159F MED LIST DOCD IN RCRD: CPT | Performed by: FAMILY MEDICINE

## 2023-09-01 PROCEDURE — 3074F SYST BP LT 130 MM HG: CPT | Performed by: FAMILY MEDICINE

## 2023-09-01 PROCEDURE — 80053 COMPREHEN METABOLIC PANEL: CPT | Performed by: FAMILY MEDICINE

## 2023-09-01 PROCEDURE — 1160F RVW MEDS BY RX/DR IN RCRD: CPT | Performed by: FAMILY MEDICINE

## 2023-09-01 PROCEDURE — 3079F DIAST BP 80-89 MM HG: CPT | Performed by: FAMILY MEDICINE

## 2023-09-01 PROCEDURE — 36415 COLL VENOUS BLD VENIPUNCTURE: CPT | Performed by: FAMILY MEDICINE

## 2023-09-01 RX ORDER — CIPROFLOXACIN 500 MG/1
500 TABLET, FILM COATED ORAL 2 TIMES DAILY
Qty: 14 TABLET | Refills: 0 | Status: SHIPPED | OUTPATIENT
Start: 2023-09-01

## 2023-09-01 NOTE — PROGRESS NOTES
"Chief Complaint  Neck Pain (X 6 days ), Back Pain (X 6 days ), Dizziness, Nausea, and Diarrhea    Subjective        Davian Arevalo presents to [facility]  Neck Pain   This is a new problem. The current episode started in the past 7 days. The problem occurs daily. The problem has been waxing and waning. The pain is associated with nothing. The quality of the pain is described as aching. The pain is mild. Pertinent negatives include no chest pain. He has tried nothing for the symptoms.   Back Pain  This is a recurrent problem. The current episode started in the past 7 days. The problem occurs daily. The problem has been waxing and waning since onset. The pain is present in the lumbar spine. The quality of the pain is described as aching. The pain does not radiate. The pain is moderate. The symptoms are aggravated by position. Pertinent negatives include no chest pain. He has tried nothing for the symptoms.   Dizziness  This is a new problem. The current episode started in the past 7 days. The problem occurs intermittently. The problem has been waxing and waning. Associated symptoms include congestion, nausea, neck pain, a sore throat and swollen glands. Pertinent negatives include no chest pain, chills or coughing. Nothing aggravates the symptoms.   Nausea  This is a new problem. The current episode started in the past 7 days. The problem occurs intermittently. The problem has been waxing and waning. Associated symptoms include congestion, nausea, neck pain, a sore throat and swollen glands. Pertinent negatives include no chest pain, chills or coughing.   Diarrhea   Pertinent negatives include no chills or coughing.     Objective   Vital Signs:  /83 (BP Location: Left arm, Patient Position: Sitting, Cuff Size: Large Adult)   Pulse 68   Temp 97.8 °F (36.6 °C) (Infrared)   Ht 182.9 cm (72\")   Wt 98.7 kg (217 lb 8 oz)   SpO2 96%   BMI 29.50 kg/m²   Estimated body mass " "index is 29.5 kg/m² as calculated from the following:    Height as of this encounter: 182.9 cm (72\").    Weight as of this encounter: 98.7 kg (217 lb 8 oz).     BMI is >= 25 and <30. (Overweight) The following options were offered after discussion;: exercise counseling/recommendations           Physical Exam  Vitals and nursing note reviewed.   Constitutional:       General: He is not in acute distress.     Appearance: He is well-developed.   HENT:      Head: Normocephalic.      Right Ear: A middle ear effusion is present.      Left Ear: A middle ear effusion is present.      Mouth/Throat:      Pharynx: Posterior oropharyngeal erythema present.   Eyes:      General: Lids are normal.      Conjunctiva/sclera: Conjunctivae normal.   Neck:      Thyroid: No thyroid mass or thyromegaly.      Trachea: Trachea normal.   Cardiovascular:      Rate and Rhythm: Normal rate and regular rhythm.      Heart sounds: Normal heart sounds.   Pulmonary:      Effort: Pulmonary effort is normal.      Breath sounds: Normal breath sounds.   Abdominal:      Palpations: Abdomen is soft.   Musculoskeletal:      Cervical back: Normal range of motion.   Lymphadenopathy:      Cervical: Cervical adenopathy present.   Skin:     General: Skin is warm and dry.   Neurological:      Mental Status: He is alert and oriented to person, place, and time.   Psychiatric:         Attention and Perception: He is attentive.         Mood and Affect: Mood normal.         Speech: Speech normal.         Behavior: Behavior normal.      Result Review :  The following data was reviewed by: Merlene Zamorano MD on 09/01/2023:  Common labs          4/14/2023    11:06 6/12/2023    13:53 9/1/2023    13:04   Common Labs   Glucose 85  80  98    BUN 19  16  14    Creatinine 1.00  1.07  0.95    Sodium 138  139  137    Potassium 4.6  4.7  4.2    Chloride 101  101  100    Calcium 10.2  9.8  9.9    Albumin 4.7  4.8  4.8    Total Bilirubin 0.7  0.7  0.4    Alkaline Phosphatase 75  " 76  86    AST (SGOT) 23  25  31    ALT (SGPT) 39  37  57    WBC 7.58   9.29    Hemoglobin 16.6   16.5    Hematocrit 48.3   49.0    Platelets 370   423    Total Cholesterol  172     Triglycerides  141     HDL Cholesterol  39     LDL Cholesterol   108                    Assessment and Plan   Diagnoses and all orders for this visit:    1. Sore throat (Primary)  -     CBC & Differential  -     Comprehensive Metabolic Panel  -     EBV Antibody Profile; Future    2. Acute cough  -     CBC & Differential  -     Comprehensive Metabolic Panel  -     EBV Antibody Profile; Future    3. Nausea  -     CBC & Differential  -     Comprehensive Metabolic Panel  -     EBV Antibody Profile; Future  -     XR Abdomen KUB; Future    4. Cervical lymphadenopathy  -     CBC & Differential  -     Comprehensive Metabolic Panel  -     EBV Antibody Profile; Future    5. Non-recurrent acute suppurative otitis media of both ears without spontaneous rupture of tympanic membranes  -     ciprofloxacin (Cipro) 500 MG tablet; Take 1 tablet by mouth 2 (Two) Times a Day.  Dispense: 14 tablet; Refill: 0    6. Acute midline low back pain without sciatica  -     XR Abdomen KUB; Future  -     POCT urinalysis dipstick, automated             Follow Up   No follow-ups on file.  Patient was given instructions and counseling regarding his condition or for health maintenance advice. Please see specific information pulled into the AVS if appropriate.

## 2023-09-05 LAB
EBV NA IGG SER IA-ACNC: <18 U/ML (ref 0–17.9)
EBV VCA IGG SER IA-ACNC: <18 U/ML (ref 0–17.9)
EBV VCA IGM SER IA-ACNC: <36 U/ML (ref 0–35.9)
SERVICE CMNT-IMP: NORMAL

## 2023-09-20 NOTE — ED PROVIDER NOTES
Subjective   Chief complaint chest tightness nausea    History of present illness 35-year-old male who states he was using a respirator today when he was cutting some copper tubing he states that he did developed a little bit of a cough and then he had some tightness in his chest associated with some nausea no sweating at that time no shortness of breath.  He states he stopped what he was doing and then he seemed to be okay but tonight he woke up from his sleep feel a bit short of breath and nauseated.  No sweating no chest pain and no radiation to his neck arm or jaw he has some chronic disc problems in his disc is because his neck to hurt recently.  It does not radiate from his chest to his neck.  No recent long car ride plane or immobilization or foreign travels no recent antibiotic use or hospitalization symptoms have been since 3:00 PM intermittent this happened twice neither one with exertional worsening.  He does have some family history of heart disease.  No other associated symptoms other than the above-mentioned.  Patient never has any problems with chest pain or shortness of breath with exertion previously.          Review of Systems   Constitutional: Negative for chills and fever.   HENT: Negative for congestion and sinus pressure.    Eyes: Negative for photophobia and visual disturbance.   Respiratory: Positive for chest tightness and shortness of breath.    Cardiovascular: Negative for chest pain and palpitations.   Gastrointestinal: Negative for abdominal pain and vomiting.   Endocrine: Negative for cold intolerance and heat intolerance.   Genitourinary: Negative for difficulty urinating and dysuria.   Musculoskeletal: Positive for neck pain. Negative for arthralgias.   Skin: Negative for color change and pallor.   Neurological: Negative for dizziness, syncope and light-headedness.   Psychiatric/Behavioral: Negative for agitation and behavioral problems.       Past Medical History:   Diagnosis Date    • Abdominal pain, chronic, right lower quadrant 01/30/2015   • Anxiety    • Autism 08/13/2014    mild ( [facility]   • Carpal tunnel syndrome 02/22/2018   • Constipation 09/21/2018   • Depression    • Dyslexia 12/26/2017   • Hypertension    • IBS (irritable bowel syndrome) 09/21/2018   • IBS (irritable bowel syndrome)    • Knee pain, left 07/25/2017   • Lower back pain 01/30/2015   • Right cervical radiculopathy 01/07/2019   • Shoulder pain, right 02/14/2017   • Tinnitus    • Vitamin D deficiency 12/26/2017       Allergies   Allergen Reactions   • Ceclor [Cefaclor] Hives   • Amoxicillin Hives   • Cephalexin Hives     Tolerated CTX 12/2020 admission   • Cloral Hives       Past Surgical History:   Procedure Laterality Date   • ADENOIDECTOMY     • COLONOSCOPY     • CYSTOSCOPY, URETEROSCOPY, RETROGRADE PYELOGRAM, STENT INSERTION Right 12/15/2020    Procedure: CYSTOSCOPY URETEROSCOPY STONE BASKET EXTRACTION AND STENT INSERTION;  Surgeon: Gerry Giron MD;  Location: [facility] OR;  Service: Urology;  Laterality: Right;   • TONSILLECTOMY  1993    Tonsillectomy and Adenoids-   • TONSILLECTOMY AND ADENOIDECTOMY         Family History   Problem Relation Age of Onset   • Cancer Mother    • Cancer Father    • Hypertension Father    • Stroke Father    • Hyperlipidemia Father    • Hypertension Mother    • Atrial fibrillation Mother    • Diabetes Paternal Uncle    • Diabetes Maternal Grandmother    • Stroke Maternal Grandfather    • Diabetes Maternal Grandfather    • Stroke Other        Social History     Socioeconomic History   • Marital status: Single     Spouse name: Not on file   • Number of children: Not on file   • Years of education: Not on file   • Highest education level: Not on file   Tobacco Use   • Smoking status: Never Smoker   • Smokeless tobacco: Never Used   Substance and Sexual Activity   • Alcohol use: Never   • Drug use: No     Prior to Admission medications    Medication Sig Start Date  End Date Taking? Authorizing Provider   atenolol (TENORMIN) 25 MG tablet TAKE ONE TABLET BY MOUTH ONCE NIGHTLY 4/6/21   Merlene Zamorano MD   azithromycin (Zithromax Z-Savage) 250 MG tablet Take 2 tablets the first day, then 1 tablet daily for 4 days. 12/29/20   Merlene Zamorano MD   cholecalciferol (VITAMIN D3) 1000 units tablet Take 1,000 Units by mouth Daily. 12/26/17   Merlene Zamorano MD   dicyclomine (BENTYL) 20 MG tablet Take 20 mg by mouth 3 (Three) Times a Day. 8/13/14   Merlene Zamorano MD   folic acid-vit B6-vit B12 (FOLBEE) 2.5-25-1 MG tablet tablet Take  by mouth Daily.    Provider, MD Isabella   lisinopril (PRINIVIL,ZESTRIL) 20 MG tablet TAKE ONE TABLET BY MOUTH DAILY 2/5/21   Merlene Zamorano MD   nortriptyline (PAMELOR) 50 MG capsule Take 100 mg by mouth Every Night. 8/13/14   Merlene Zamorano MD   vitamin B-12 (CYANOCOBALAMIN) 1000 MCG tablet Take 1,000 mcg by mouth Daily. 4/20/16   Merlene Zamorano MD           Objective   Physical Exam  35-year-old male awake alert no acute distress resting comfortably.  HEENT extraocular muscles are intact pupils equal and reactive no photophobia mouth clear  Neck supple no adenopathy no JVD no bruits  Lungs clear no retraction no use of accessory  Heart regular without murmur rub  Abdomen soft nontender good bowel sounds no peritoneal findings or pulsatile masses.  Extremities pulses are equal throughout upper and lower extremities no edema cords or Homans' sign or evidence of DVT.  Skin warm dry no rashes.  Neurologic he is awake alert follows commands no facial asymmetry normal speech no focal weakness  Procedures           ED Course      Results for orders placed or performed during the hospital encounter of 04/17/21   Comprehensive Metabolic Panel    Specimen: Blood   Result Value Ref Range    Glucose 96 65 - 99 mg/dL    BUN 18 6 - 20 mg/dL    Creatinine 0.85 0.76 - 1.27 mg/dL    Sodium 138 136 - 145 mmol/L    Potassium 3.9 3.5 - 5.2 mmol/L     Chloride 99 98 - 107 mmol/L    CO2 25.0 22.0 - 29.0 mmol/L    Calcium 9.9 8.6 - 10.5 mg/dL    Total Protein 7.3 6.0 - 8.5 g/dL    Albumin 4.30 3.50 - 5.20 g/dL    ALT (SGPT) 40 1 - 41 U/L    AST (SGOT) 29 1 - 40 U/L    Alkaline Phosphatase 91 39 - 117 U/L    Total Bilirubin 0.6 0.0 - 1.2 mg/dL    eGFR Non African Amer 101 >60 mL/min/1.73    Globulin 3.0 gm/dL    A/G Ratio 1.4 g/dL    BUN/Creatinine Ratio 21.2 7.0 - 25.0    Anion Gap 14.0 5.0 - 15.0 mmol/L   Lipase    Specimen: Blood   Result Value Ref Range    Lipase 39 13 - 60 U/L   Troponin    Specimen: Blood   Result Value Ref Range    Troponin T <0.010 0.000 - 0.030 ng/mL   Magnesium    Specimen: Blood   Result Value Ref Range    Magnesium 2.0 1.6 - 2.6 mg/dL   TSH    Specimen: Blood   Result Value Ref Range    TSH 0.999 0.270 - 4.200 uIU/mL   CBC Auto Differential    Specimen: Blood   Result Value Ref Range    WBC 8.20 3.40 - 10.80 10*3/mm3    RBC 4.92 4.14 - 5.80 10*6/mm3    Hemoglobin 15.4 13.0 - 17.7 g/dL    Hematocrit 43.1 37.5 - 51.0 %    MCV 87.7 79.0 - 97.0 fL    MCH 31.3 26.6 - 33.0 pg    MCHC 35.7 31.5 - 35.7 g/dL    RDW 12.5 12.3 - 15.4 %    RDW-SD 38.9 37.0 - 54.0 fl    MPV 7.4 6.0 - 12.0 fL    Platelets 364 140 - 450 10*3/mm3    Neutrophil % 52.2 42.7 - 76.0 %    Lymphocyte % 31.7 19.6 - 45.3 %    Monocyte % 11.5 5.0 - 12.0 %    Eosinophil % 3.9 0.3 - 6.2 %    Basophil % 0.7 0.0 - 1.5 %    Neutrophils, Absolute 4.30 1.70 - 7.00 10*3/mm3    Lymphocytes, Absolute 2.60 0.70 - 3.10 10*3/mm3    Monocytes, Absolute 0.90 0.10 - 0.90 10*3/mm3    Eosinophils, Absolute 0.30 0.00 - 0.40 10*3/mm3    Basophils, Absolute 0.10 0.00 - 0.20 10*3/mm3    nRBC 0.1 0.0 - 0.2 /100 WBC   ECG 12 Lead   Result Value Ref Range    QT Interval 364 ms   Light Blue Top   Result Value Ref Range    Extra Tube hold for add-on    Gold Top - SST   Result Value Ref Range    Extra Tube Hold for add-ons.      No radiology results for the last day  Medications   sodium chloride 0.9 %  flush 10 mL (has no administration in time range)     EKG my interpretation normal sinus rhythm rate 90 normal axis hypertrophy patient has some PVCs QTC of 454 no acute ST elevation PVCs are new from previous EKG abnormal       Chest x-ray on my review revealed no acute disease                                MDM  Number of Diagnoses or Management Options  Chest tightness: new and requires workup  Diagnosis management comments: Medical decision making.  Patient had IV established placed on a cardiac monitor with a sinus rhythm with occasional PVC EKG showed a sinus rhythm without acute findings some PVCs but no acute elevation or ischemic changes.  Laboratory evaluation White count was 8.2 hemoglobin was 15.4 TSH was normal magnesium normal troponins were negative lipase was normal demonstrates liver enzymes unremarkable chest x-ray on my review showed no acute disease.  Patient repeat exam is resting company has no pain no shortness of breath.  His sats are 99% on room air respiratory of 18 heart rates in the 80s.  Occasional PVC.  He has negative troponins this is been happened twice since 3:00 this afternoon.  His overall heart score is low.  Score 2.  At this point we talked about outpatient versus inpatient is resting comfortably.  We talked about admission overnight for stress testing 1 to rule out coronary artery disease.  Patient states he would rather go home.  He gets worse he will return.  I have less suspicion is been related to his cutting this copper tubing and inhalational injury.  His sats been good he has no respiratory distress although we talked about what to return for any staying away from after a couple days.  He will return if he gets worse or has increasing pain he is well aware of the potential risk he was stable and discharged home for outpatient management cardiology referral.  See no evidence of suggest acute DVT or pulmonary embolism he has no risk for this was no leg pain or swelling  sats and respiratory rate are all good.  No evidence of acute aortic dissection by clinical exam pulses equal throughout upper and lower extremities.  He is not in any acute distress and chest x-ray looks stable at this point.       Amount and/or Complexity of Data Reviewed  Clinical lab tests: reviewed  Tests in the medicine section of CPT®: reviewed  Independent visualization of images, tracings, or specimens: yes    Risk of Complications, Morbidity, and/or Mortality  Presenting problems: high  Diagnostic procedures: high  Management options: high    Patient Progress  Patient progress: stable      Final diagnoses:   Chest tightness       ED Disposition  ED Disposition     ED Disposition Condition Comment    Discharge Stable           Merlene Zamorano MD  [address]  [address]  [place] [address]  [phone]    In 3 days      Feliz Bansal MD  [address] [facility] [place] [address]  [phone]    In 3 days           Medication List      No changes were made to your prescriptions during this visit.          Gentry Newell MD  04/17/21 0412     Pt co L sided HA x1 week and L eye redness/ drainage x1 wk. Mild relief from tylenol. Hx migraines- not on any rx meds. Neuro intact.

## 2023-09-29 ENCOUNTER — HOSPITAL ENCOUNTER (OUTPATIENT)
Dept: GENERAL RADIOLOGY | Facility: HOSPITAL | Age: 38
Discharge: HOME | End: 2023-09-29
Payer: MEDICAID

## 2023-09-29 ENCOUNTER — OFFICE VISIT (OUTPATIENT)
Dept: FAMILY MEDICINE CLINIC | Facility: CLINIC | Age: 38
End: 2023-09-29
Payer: MEDICAID

## 2023-09-29 VITALS
OXYGEN SATURATION: 97 % | BODY MASS INDEX: 30.26 KG/M2 | DIASTOLIC BLOOD PRESSURE: 69 MMHG | HEART RATE: 87 BPM | SYSTOLIC BLOOD PRESSURE: 94 MMHG | HEIGHT: 72 IN | TEMPERATURE: 98.7 F | WEIGHT: 223.4 LBS

## 2023-09-29 DIAGNOSIS — S39.012A STRAIN OF LUMBAR REGION, INITIAL ENCOUNTER: Primary | ICD-10-CM

## 2023-09-29 DIAGNOSIS — S29.012A STRAIN OF THORACIC BACK REGION: ICD-10-CM

## 2023-09-29 DIAGNOSIS — S39.012A STRAIN OF LUMBAR REGION, INITIAL ENCOUNTER: ICD-10-CM

## 2023-09-29 PROCEDURE — 72100 X-RAY EXAM L-S SPINE 2/3 VWS: CPT

## 2023-09-29 PROCEDURE — 72072 X-RAY EXAM THORAC SPINE 3VWS: CPT

## 2023-09-29 RX ORDER — METHOCARBAMOL 500 MG/1
500 TABLET, FILM COATED ORAL 3 TIMES DAILY PRN
Qty: 30 TABLET | Refills: 0 | Status: SHIPPED | OUTPATIENT
Start: 2023-09-29

## 2023-09-29 NOTE — PROGRESS NOTES
"Chief Complaint  Back Pain (X 2 weeks ), Neck Pain (X 2 weeks ), and Abdominal Pain (Comes and goes )    Subjective        Davian Arevalo presents to [facility]  History of Present Illness  He lifted a window air conditioning unit recently.   Today he feels like he could fall forward.  He has had dizziness and blurry vision and neck pain.   Back Pain  This is a recurrent problem. The current episode started in the past 7 days. The problem occurs constantly. The problem is unchanged. The pain is present in the lumbar spine. The quality of the pain is described as aching. The pain is moderate. The symptoms are aggravated by position and bending. Associated symptoms include abdominal pain. Pertinent negatives include no chest pain, dysuria, fever, leg pain, numbness, pelvic pain, tingling or weakness. Treatments tried: Tylenol. The treatment provided mild relief.   Neck Pain   Pertinent negatives include no chest pain, fever, leg pain, numbness, tingling or weakness.   Abdominal Pain  This is a chronic problem. The current episode started more than 1 month ago. Pertinent negatives include no dysuria or fever.       Objective   Vital Signs:  BP 94/69 (BP Location: Left arm, Patient Position: Sitting, Cuff Size: Large Adult)   Pulse 87   Temp 98.7 øF (37.1 øC) (Infrared)   Ht 182.9 cm (72\")   Wt 101 kg (223 lb 6.4 oz)   SpO2 97%   BMI 30.30 kg/mý   Estimated body mass index is 30.3 kg/mý as calculated from the following:    Height as of this encounter: 182.9 cm (72\").    Weight as of this encounter: 101 kg (223 lb 6.4 oz).     BMI is >= 30 and <35. (Class 1 Obesity). The following options were offered after discussion;: exercise counseling/recommendations           Physical Exam  Vitals and nursing note reviewed.   Constitutional:       General: He is not in acute distress.     Appearance: He is well-developed.   HENT:      Head: Normocephalic.   Eyes:      General: Lids are " normal.      Conjunctiva/sclera: Conjunctivae normal.   Neck:      Thyroid: No thyroid mass or thyromegaly.      Trachea: Trachea normal.   Cardiovascular:      Rate and Rhythm: Normal rate and regular rhythm.      Heart sounds: Normal heart sounds.   Pulmonary:      Effort: Pulmonary effort is normal.      Breath sounds: Normal breath sounds.   Abdominal:      Palpations: Abdomen is soft.   Musculoskeletal:      Cervical back: Normal range of motion.      Lumbar back: Tenderness present.   Lymphadenopathy:      Cervical: No cervical adenopathy.   Skin:     General: Skin is warm and dry.   Neurological:      Mental Status: He is alert and oriented to person, place, and time.   Psychiatric:         Attention and Perception: He is attentive.         Mood and Affect: Mood normal.         Speech: Speech normal.         Behavior: Behavior normal.        Result Review :  The following data was reviewed by: Merlene Zamorano MD on 09/29/2023:  Common labs          4/14/2023    11:06 6/12/2023    13:53 9/1/2023    13:04   Common Labs   Glucose 85  80  98    BUN 19  16  14    Creatinine 1.00  1.07  0.95    Sodium 138  139  137    Potassium 4.6  4.7  4.2    Chloride 101  101  100    Calcium 10.2  9.8  9.9    Albumin 4.7  4.8  4.8    Total Bilirubin 0.7  0.7  0.4    Alkaline Phosphatase 75  76  86    AST (SGOT) 23  25  31    ALT (SGPT) 39  37  57    WBC 7.58   9.29    Hemoglobin 16.6   16.5    Hematocrit 48.3   49.0    Platelets 370   423    Total Cholesterol  172     Triglycerides  141     HDL Cholesterol  39     LDL Cholesterol   108                    Assessment and Plan   Diagnoses and all orders for this visit:    1. Strain of lumbar region, initial encounter (Primary)  -     XR Spine Lumbar 2 or 3 View; Future  -     methocarbamol (ROBAXIN) 500 MG tablet; Take 1 tablet by mouth 3 (Three) Times a Day As Needed for Muscle Spasms.  Dispense: 30 tablet; Refill: 0    2. Strain of thoracic back region  -     XR Spine Thoracic  3 View; Future  -     methocarbamol (ROBAXIN) 500 MG tablet; Take 1 tablet by mouth 3 (Three) Times a Day As Needed for Muscle Spasms.  Dispense: 30 tablet; Refill: 0             Follow Up   No follow-ups on file.  Patient was given instructions and counseling regarding his condition or for health maintenance advice. Please see specific information pulled into the AVS if appropriate.

## 2023-11-29 RX ORDER — ATENOLOL 25 MG/1
TABLET ORAL
Qty: 90 TABLET | Refills: 2 | Status: SHIPPED | OUTPATIENT
Start: 2023-11-29

## 2023-12-01 ENCOUNTER — OFFICE VISIT (OUTPATIENT)
Dept: FAMILY MEDICINE CLINIC | Facility: CLINIC | Age: 38
End: 2023-12-01
Payer: MEDICAID

## 2023-12-01 ENCOUNTER — TELEPHONE (OUTPATIENT)
Dept: FAMILY MEDICINE CLINIC | Facility: CLINIC | Age: 38
End: 2023-12-01

## 2023-12-01 VITALS
HEART RATE: 96 BPM | TEMPERATURE: 97 F | RESPIRATION RATE: 16 BRPM | WEIGHT: 232.4 LBS | SYSTOLIC BLOOD PRESSURE: 102 MMHG | OXYGEN SATURATION: 97 % | DIASTOLIC BLOOD PRESSURE: 70 MMHG | BODY MASS INDEX: 31.48 KG/M2 | HEIGHT: 72 IN

## 2023-12-01 DIAGNOSIS — R59.0 CERVICAL LYMPHADENOPATHY: Primary | ICD-10-CM

## 2023-12-01 DIAGNOSIS — R07.0 THROAT DISCOMFORT: ICD-10-CM

## 2023-12-01 DIAGNOSIS — R12 HEARTBURN: ICD-10-CM

## 2023-12-01 DIAGNOSIS — R09.A2 FOREIGN BODY SENSATION IN THROAT: ICD-10-CM

## 2023-12-01 PROCEDURE — 1160F RVW MEDS BY RX/DR IN RCRD: CPT | Performed by: FAMILY MEDICINE

## 2023-12-01 PROCEDURE — 3074F SYST BP LT 130 MM HG: CPT | Performed by: FAMILY MEDICINE

## 2023-12-01 PROCEDURE — 99213 OFFICE O/P EST LOW 20 MIN: CPT | Performed by: FAMILY MEDICINE

## 2023-12-01 PROCEDURE — 3078F DIAST BP <80 MM HG: CPT | Performed by: FAMILY MEDICINE

## 2023-12-01 PROCEDURE — 1159F MED LIST DOCD IN RCRD: CPT | Performed by: FAMILY MEDICINE

## 2023-12-01 RX ORDER — OMEPRAZOLE 20 MG/1
20 TABLET, DELAYED RELEASE ORAL DAILY
Qty: 30 TABLET | Refills: 3 | Status: SHIPPED | OUTPATIENT
Start: 2023-12-01

## 2023-12-01 NOTE — PROGRESS NOTES
Subjective   Davian Arevalo is a 38 y.o. male.     History of Present Illness  38-year-old male patient present with complaint of sore throat.  Symptoms started yesterday.  Patient thinks that he might swallow a  clear piece of plastic while cutting plastic at home.  He is not sure but he said his symptoms started later that day.  He denies difficulty swallowing and he is eating and drinking fine.  No neck pain, ear pain, cough or headache.  Sore Throat   This is a new problem. The current episode started yesterday. The problem has been unchanged. There has been no fever. Associated symptoms include congestion, coughing, headaches and swollen glands. Pertinent negatives include no abdominal pain, drooling, ear pain, hoarse voice, neck pain, shortness of breath, trouble swallowing or vomiting. He has tried nothing for the symptoms.   Patient also stated that he has an enlarged posterior cervical lymph node since past month.    He is also complaining of heartburn but denies abdominal pain, nausea, vomiting or reflux.    The following portions of the patient's history were reviewed and updated as appropriate: past medical history, past social history, past surgical history and problem list.    Review of Systems   Constitutional:  Negative for fever.   HENT:  Positive for congestion, sore throat and swollen glands. Negative for drooling, ear pain, hoarse voice, sinus pressure and trouble swallowing.    Respiratory:  Positive for cough. Negative for shortness of breath and wheezing.    Cardiovascular:  Negative for chest pain and palpitations.   Gastrointestinal:  Positive for indigestion. Negative for abdominal distention, abdominal pain and vomiting.        Heartburn   Musculoskeletal:  Negative for neck pain.   Neurological:  Positive for headache. Negative for dizziness.       Objective   Physical Exam  Vitals reviewed.   Constitutional:       General: He is not in acute distress.  HENT:      Right Ear: Tympanic  membrane, ear canal and external ear normal.      Left Ear: Tympanic membrane, ear canal and external ear normal.      Mouth/Throat:      Pharynx: No posterior oropharyngeal erythema.   Eyes:      Conjunctiva/sclera: Conjunctivae normal.   Neck:      Comments: Posterior cervical lymphadenopathy right side more than left  Pulmonary:      Effort: Pulmonary effort is normal.      Breath sounds: Normal breath sounds. No wheezing.   Musculoskeletal:      Cervical back: Normal range of motion and neck supple.   Lymphadenopathy:      Cervical: Cervical adenopathy present.   Neurological:      Mental Status: He is alert and oriented to person, place, and time.       Vitals:    12/01/23 1250   BP: 102/70   Pulse: 96   Resp: 16   Temp: 97 °F (36.1 °C)   SpO2: 97%     Current Outpatient Medications on File Prior to Visit   Medication Sig Dispense Refill    atenolol (TENORMIN) 25 MG tablet TAKE ONE TABLET BY MOUTH ONCE NIGHTLY 90 tablet 2    azelastine (OPTIVAR) 0.05 % ophthalmic solution Administer 1 drop to both eyes Every 12 (Twelve) Hours.      chlorhexidine (PERIDEX) 0.12 % solution Apply 0.12 mL to the mouth or throat 2 (Two) Times a Day.      cholecalciferol (VITAMIN D3) 1000 units tablet Take 1 tablet by mouth Daily.      ciprofloxacin (Cipro) 500 MG tablet Take 1 tablet by mouth 2 (Two) Times a Day. 14 tablet 0    dicyclomine (BENTYL) 20 MG tablet Take 1 tablet by mouth 3 (Three) Times a Day.      folic acid-vit B6-vit B12 (FOLBEE) 2.5-25-1 MG tablet tablet Take  by mouth Daily.      hydrocortisone 2.5 % cream Apply 1 application topically to the appropriate area as directed 2 (Two) Times a Day. 28 g 1    lisinopril (PRINIVIL,ZESTRIL) 20 MG tablet TAKE ONE TABLET BY MOUTH DAILY 90 tablet 3    methocarbamol (ROBAXIN) 500 MG tablet Take 1 tablet by mouth 3 (Three) Times a Day As Needed for Muscle Spasms. 30 tablet 0    mometasone (ELOCON) 0.1 % ointment Apply 1 application  topically to the appropriate area as directed  Daily.      nortriptyline (PAMELOR) 50 MG capsule Take 2 capsules by mouth Every Night.      vitamin B-12 (CYANOCOBALAMIN) 1000 MCG tablet Take 1 tablet by mouth Daily.       No current facility-administered medications on file prior to visit.           Assessment & Plan   Problems Addressed this Visit       Cervical lymphadenopathy - Primary    Relevant Orders    Ambulatory Referral to ENT (Otolaryngology)    Throat discomfort     Patient thinks he might has swallow piece of plastic but he is not sure I will refer him to ENT for further evaluation.  He refused to order any imaging studies.         Relevant Orders    Ambulatory Referral to ENT (Otolaryngology)    Heartburn      Discussed dietary changes and lifestyle modifications.  Rx omeprazole.         Relevant Medications    omeprazole OTC (PrilOSEC OTC) 20 MG EC tablet    Foreign body sensation in throat    Relevant Orders    Ambulatory Referral to ENT (Otolaryngology)     Diagnoses         Codes Comments    Cervical lymphadenopathy posterior    -  Primary ICD-10-CM: R59.0  ICD-9-CM: 785.6     Throat discomfort     ICD-10-CM: R07.0  ICD-9-CM: 784.1     Heartburn     ICD-10-CM: R12  ICD-9-CM: 787.1     Foreign body sensation in throat     ICD-10-CM: R09.A2  ICD-9-CM: 784.99

## 2023-12-01 NOTE — ASSESSMENT & PLAN NOTE
Patient thinks he might has swallow piece of plastic but he is not sure I will refer him to ENT for further evaluation.  He refused to order any imaging studies.

## 2023-12-01 NOTE — TELEPHONE ENCOUNTER
HUB TO RELAY    CALLED AND LVM:    DR AC HAD ME CALL [facility] AND SCHEDULE AN APPT FOR THE PATIENT. THE APPT INFORMATION IS BELOW. ADVISED TO CALL BACK WITH ANY QUESTIONS.    [facility]  DR REAGAN  [phone]  [address] [place] [address]

## 2023-12-05 ENCOUNTER — HOSPITAL ENCOUNTER (EMERGENCY)
Facility: HOSPITAL | Age: 38
Discharge: HOME | End: 2023-12-06
Attending: EMERGENCY MEDICINE
Payer: MEDICAID

## 2023-12-05 VITALS
SYSTOLIC BLOOD PRESSURE: 117 MMHG | WEIGHT: 233.47 LBS | RESPIRATION RATE: 16 BRPM | HEIGHT: 72 IN | OXYGEN SATURATION: 95 % | HEART RATE: 88 BPM | TEMPERATURE: 98.3 F | DIASTOLIC BLOOD PRESSURE: 76 MMHG | BODY MASS INDEX: 31.62 KG/M2

## 2023-12-05 DIAGNOSIS — J02.9 SORE THROAT: Primary | ICD-10-CM

## 2023-12-05 DIAGNOSIS — J02.9 ACUTE PHARYNGITIS, UNSPECIFIED ETIOLOGY: ICD-10-CM

## 2023-12-05 LAB
FLUAV SUBTYP SPEC NAA+PROBE: NOT DETECTED
FLUBV RNA ISLT QL NAA+PROBE: NOT DETECTED
S PYO AG THROAT QL: NEGATIVE
SARS-COV-2 RNA RESP QL NAA+PROBE: NOT DETECTED

## 2023-12-05 PROCEDURE — 87651 STREP A DNA AMP PROBE: CPT | Performed by: PHYSICIAN ASSISTANT

## 2023-12-05 PROCEDURE — 87636 SARSCOV2 & INF A&B AMP PRB: CPT | Performed by: PHYSICIAN ASSISTANT

## 2023-12-05 PROCEDURE — 99283 EMERGENCY DEPT VISIT LOW MDM: CPT

## 2023-12-05 RX ORDER — ACETAMINOPHEN 500 MG
1000 TABLET ORAL ONCE
Status: COMPLETED | OUTPATIENT
Start: 2023-12-05 | End: 2023-12-05

## 2023-12-05 RX ADMIN — ACETAMINOPHEN 1000 MG: 500 TABLET ORAL at 23:08

## 2023-12-05 NOTE — Clinical Note
[facility] EMERGENCY DEPARTMENT  [address] [place] [address]  Phone: [phone]    Davian Arevalo was seen and treated in our emergency department on 12/5/2023.  He may return to work on 12/07/2023.         Thank you for choosing [facility].    Alon Thompson PA

## 2023-12-06 NOTE — ED NOTES
Pt states he had a piece of a nail clipping stuck in his throat and since then his throat has started hurting and is having trouble breathing, pt is in no obvious s/s of acute distress at this moment

## 2023-12-06 NOTE — DISCHARGE INSTRUCTIONS
Please take Tylenol as needed for pain control.  Please use warm teas and warm soups to help soothe your throat.  Please follow-up with primary care in 1 week's time, can follow-up with ENT in 1 week's time as well for sore throat.

## 2023-12-06 NOTE — ED PROVIDER NOTES
Subjective   History of Present Illness      Patient is a 38-year-old male comes in complaining of sore throat since last night.  Patient states that he was cutting his fingernails when he accidentally got a piece of his fingernail into his mouth and swallowed this.  Patient thought he could feel it in the back of his throat but could not get the sensation to go away.  Patient reports worsening sore throat throughout the day today.  Patient states he has been able to tolerate his own secretions, water and food without issue.  Patient states sometimes it hurts to swallow other times not.  Patient does report a history of strep throat in the past and is wondering if he also has strep throat.      Review of Systems   Constitutional:  Negative for chills, fatigue and fever.   HENT:  Positive for sore throat. Negative for congestion, tinnitus and trouble swallowing.    Eyes:  Negative for photophobia, discharge and visual disturbance.   Respiratory:  Negative for cough, shortness of breath and wheezing.    Cardiovascular:  Negative for chest pain, palpitations and leg swelling.   Gastrointestinal:  Negative for abdominal pain, diarrhea, nausea and vomiting.   Genitourinary:  Negative for dysuria, flank pain, frequency and urgency.   Musculoskeletal:  Negative for arthralgias and myalgias.   Skin:  Negative for rash.   Neurological:  Negative for dizziness and headaches.   Psychiatric/Behavioral:  Negative for confusion.        Past Medical History:   Diagnosis Date    Abdominal pain, chronic, right lower quadrant 01/30/2015    Anxiety     Autism 08/13/2014    mild ( Abstracted from [facility].)    Carpal tunnel syndrome 02/22/2018    Constipation 09/21/2018    Depression     Dyslexia 12/26/2017    Hypertension     IBS (irritable bowel syndrome) 09/21/2018    IBS (irritable bowel syndrome)     Knee pain, left 07/25/2017    Lower back pain 01/30/2015    Right cervical radiculopathy 01/07/2019    Shoulder pain, right  02/14/2017    Tinnitus     Vitamin D deficiency 12/26/2017       Allergies   Allergen Reactions    Ceclor [Cefaclor] Hives    Amoxicillin Hives    Cephalexin Hives     Tolerated CTX 12/2020 admission    Cloral Hives       Past Surgical History:   Procedure Laterality Date    ADENOIDECTOMY      COLONOSCOPY      CYSTOSCOPY, URETEROSCOPY, RETROGRADE PYELOGRAM, STENT INSERTION Right 12/15/2020    Procedure: CYSTOSCOPY URETEROSCOPY STONE BASKET EXTRACTION AND STENT INSERTION;  Surgeon: Gerry Giron MD;  Location: [facility] MAIN OR;  Service: Urology;  Laterality: Right;    TONSILLECTOMY  1993    Tonsillectomy and Adenoids-    TONSILLECTOMY AND ADENOIDECTOMY         Family History   Problem Relation Age of Onset    Cancer Mother     Hypertension Mother     Atrial fibrillation Mother     Cancer Father     Hypertension Father     Stroke Father     Hyperlipidemia Father     Diabetes Paternal Uncle     Diabetes Maternal Grandmother     Other Maternal Grandfather         Parkinson's disease    Stroke Maternal Grandfather     Diabetes Maternal Grandfather     Stroke Other        Social History     Socioeconomic History    Marital status: Single   Tobacco Use    Smoking status: Never    Smokeless tobacco: Never   Vaping Use    Vaping Use: Unknown   Substance and Sexual Activity    Alcohol use: Never    Drug use: No           Objective   Physical Exam  Vitals and nursing note reviewed.   Constitutional:       General: He is not in acute distress.     Appearance: He is well-developed. He is not diaphoretic.   HENT:      Head: Normocephalic and atraumatic.      Right Ear: Ear canal normal.      Left Ear: Ear canal normal.      Nose: Nose normal.      Mouth/Throat:      Mouth: Mucous membranes are moist. No oral lesions.      Pharynx: Uvula midline. Posterior oropharyngeal erythema present. No pharyngeal swelling, oropharyngeal exudate or uvula swelling.   Eyes:      Extraocular Movements: Extraocular movements intact.       Conjunctiva/sclera: Conjunctivae normal.      Pupils: Pupils are equal, round, and reactive to light.   Cardiovascular:      Rate and Rhythm: Normal rate and regular rhythm.      Heart sounds: Normal heart sounds.      Comments: S1, S2 audible.  Pulmonary:      Effort: Pulmonary effort is normal. No respiratory distress.      Breath sounds: Normal breath sounds. No wheezing.      Comments: On room air.  Abdominal:      General: Bowel sounds are normal. There is no distension.      Palpations: Abdomen is soft.      Tenderness: There is no abdominal tenderness.   Musculoskeletal:         General: No tenderness or deformity. Normal range of motion.      Cervical back: Normal range of motion.   Skin:     General: Skin is warm.      Capillary Refill: Capillary refill takes less than 2 seconds.      Findings: No erythema or rash.   Neurological:      Mental Status: He is alert and oriented to person, place, and time.      Cranial Nerves: No cranial nerve deficit.   Psychiatric:         Mood and Affect: Mood normal.         Behavior: Behavior normal.         Procedures           ED Course                                 Labs Reviewed   RAPID STREP A SCREEN - Normal   COVID-19 AND FLU A/B, NP SWAB IN TRANSPORT MEDIA 1 HR TAT - Normal    Narrative:     Fact sheet for providers: https://www.fda.gov/media/553718/download    Fact sheet for patients: https://www.fda.gov/media/222034/download    Test performed by PCR.                 Medical Decision Making  Problems Addressed:  Acute pharyngitis, unspecified etiology: acute illness or injury  Sore throat: acute illness or injury    Risk  OTC drugs.      Differential diagnosis: strep pharyngitis, viral pharyngitis, not ment to be an all inclusive list.  Chart review: Last family medicine visit on 12/1/2023, patient was seen for sore throat at that time.  Patient had reported recurrent symptoms at that time.  Patient was referred to ENT for foreign body sensation in the throat.   "Patient thinks he had swallowed a piece of plastic at that time.  Patient refused any imaging orders at that time.    EKG:  not indicated    Imaging:    No orders to display     Labs:  Lab work independently interpreted by myself shows strep swab negative, COVID and flu swab negative.    Vitals:  /76 (BP Location: Right arm, Patient Position: Sitting)   Pulse 88   Temp 98.3 °F (36.8 °C) (Oral)   Resp 16   Ht 182.9 cm (72\")   Wt 106 kg (233 lb 7.5 oz)   SpO2 95%   BMI 31.66 kg/m²    Medications given:    Medications   acetaminophen (TYLENOL) tablet 1,000 mg (1,000 mg Oral Given 12/5/23 2308)       Procedures:  not indicated  MDM: Patient is a 38-year-old male comes in complaining of sore throat. Lab work independently interpreted by myself shows strep swab negative, COVID and flu swab negative.  Patient is given Tylenol for pain control.  Patient has largely unremarkable exam.  Patient tolerating her own secretions without issue.  No peritonsillar abscess on evaluation.  Patient instructed follow-up with ENT and patient states he has a follow-up appointment with ENT on Monday. See full discharge instructions for further details.  Plan discussed with patient and is agreeable with plan.      Final diagnoses:   Sore throat   Acute pharyngitis, unspecified etiology       ED Disposition  ED Disposition       ED Disposition   Discharge    Condition   Stable    Comment   --               [facility] EMERGENCY DEPARTMENT  [address] [address] [phone]  Go in 1 day  As needed, If symptoms worsen    Merlene Zamorano MD  [address] [place] [place] [address]  [place] [address] [phone]    Schedule an appointment as soon as possible for a visit in 1 week  As needed    Remigio Hummel MD  [address] [address] [place] [address] [phone]    Schedule an appointment as soon as possible for a visit in 1 week  As needed         Medication List      No changes were made to your " prescriptions during this visit.            Alon Thompson PA  12/06/23 0150

## 2024-01-09 ENCOUNTER — OFFICE VISIT (OUTPATIENT)
Dept: FAMILY MEDICINE CLINIC | Facility: CLINIC | Age: 39
End: 2024-01-09
Payer: MEDICAID

## 2024-01-09 VITALS
SYSTOLIC BLOOD PRESSURE: 113 MMHG | OXYGEN SATURATION: 95 % | WEIGHT: 229.2 LBS | TEMPERATURE: 99.3 F | BODY MASS INDEX: 31.04 KG/M2 | DIASTOLIC BLOOD PRESSURE: 76 MMHG | HEIGHT: 72 IN | HEART RATE: 99 BPM

## 2024-01-09 DIAGNOSIS — H53.8 BLURRY VISION, BILATERAL: ICD-10-CM

## 2024-01-09 DIAGNOSIS — M25.511 ACUTE PAIN OF RIGHT SHOULDER: Primary | ICD-10-CM

## 2024-01-09 DIAGNOSIS — J06.9 ACUTE URI: ICD-10-CM

## 2024-01-09 DIAGNOSIS — R05.9 COUGH, UNSPECIFIED TYPE: ICD-10-CM

## 2024-01-09 PROBLEM — H66.003 NON-RECURRENT ACUTE SUPPURATIVE OTITIS MEDIA OF BOTH EARS WITHOUT SPONTANEOUS RUPTURE OF TYMPANIC MEMBRANES: Status: RESOLVED | Noted: 2020-12-29 | Resolved: 2024-01-09

## 2024-01-09 PROBLEM — R07.0 THROAT DISCOMFORT: Status: RESOLVED | Noted: 2023-12-01 | Resolved: 2024-01-09

## 2024-01-09 PROBLEM — J02.9 SORE THROAT: Status: RESOLVED | Noted: 2023-09-01 | Resolved: 2024-01-09

## 2024-01-09 PROBLEM — J01.00 ACUTE NON-RECURRENT MAXILLARY SINUSITIS: Status: RESOLVED | Noted: 2020-02-24 | Resolved: 2024-01-09

## 2024-01-09 PROBLEM — H66.002 NON-RECURRENT ACUTE SUPPURATIVE OTITIS MEDIA OF LEFT EAR WITHOUT SPONTANEOUS RUPTURE OF TYMPANIC MEMBRANE: Status: RESOLVED | Noted: 2022-05-11 | Resolved: 2024-01-09

## 2024-01-09 LAB
EXPIRATION DATE: NORMAL
FLUAV AG UPPER RESP QL IA.RAPID: NOT DETECTED
FLUBV AG UPPER RESP QL IA.RAPID: NOT DETECTED
INTERNAL CONTROL: NORMAL
Lab: NORMAL
SARS-COV-2 AG UPPER RESP QL IA.RAPID: NOT DETECTED

## 2024-01-09 PROCEDURE — 87428 SARSCOV & INF VIR A&B AG IA: CPT | Performed by: FAMILY MEDICINE

## 2024-01-09 PROCEDURE — 99214 OFFICE O/P EST MOD 30 MIN: CPT | Performed by: FAMILY MEDICINE

## 2024-01-09 PROCEDURE — 3078F DIAST BP <80 MM HG: CPT | Performed by: FAMILY MEDICINE

## 2024-01-09 PROCEDURE — 3074F SYST BP LT 130 MM HG: CPT | Performed by: FAMILY MEDICINE

## 2024-01-09 NOTE — PROGRESS NOTES
"Chief Complaint  Back Pain, Cough, and Shoulder Pain    Subjective        Davian Arevalo presents to [facility]  History of Present Illness  He picked up a heavy bag of trash a few days ago and now has pain in right shoulder that radiates to his right hand.  He has previous injury to his right shoulder.     99.5 degree temp this morning.  No known exposure to COVID or FLU but he has been around his mother who has \"a cold\".   Back Pain  This is a new problem. The current episode started in the past 7 days. The problem occurs intermittently. The problem has been coming and going since onset. The pain is moderate. The symptoms are aggravated by position. Associated symptoms include a fever. Pertinent negatives include no chest pain, numbness or tingling. He has tried analgesics (Tylenol) for the symptoms. The treatment provided no relief.   Cough  This is a new problem. The current episode started yesterday. The problem has been worsening. The problem occurs every few minutes. The cough is Dry. Associated symptoms include ear pain, a fever, nasal congestion, postnasal drip, rhinorrhea and a sore throat. Pertinent negatives include no chest pain.   Shoulder Injury   The incident occurred at home. The right shoulder is affected. The incident occurred 3 to 5 days ago. The injury mechanism was a twisting injury. The quality of the pain is described as aching. The pain is moderate. Pertinent negatives include no chest pain, numbness or tingling. The symptoms are aggravated by movement. He has tried acetaminophen for the symptoms. The treatment provided no relief.   Eye Problem   Both eyes are affected. This is a new problem. The current episode started 1 to 4 weeks ago. The problem occurs daily. The problem has been waxing and waning. There was no injury mechanism. The patient is experiencing no pain. There is No known exposure to pink eye. He Does not wear contacts. Associated symptoms " "include blurred vision and a fever. Pertinent negatives include no tingling. He has tried nothing for the symptoms.       Objective   Vital Signs:  /76 (BP Location: Left arm, Patient Position: Sitting, Cuff Size: Large Adult)   Pulse 99   Temp 99.3 °F (37.4 °C) (Infrared)   Ht 182.9 cm (72.01\")   Wt 104 kg (229 lb 3.2 oz)   SpO2 95%   BMI 31.08 kg/m²   Estimated body mass index is 31.08 kg/m² as calculated from the following:    Height as of this encounter: 182.9 cm (72.01\").    Weight as of this encounter: 104 kg (229 lb 3.2 oz).         BMI is >= 30 and <35. (Class 1 Obesity). The following options were offered after discussion;: exercise counseling/recommendations       Physical Exam  Vitals and nursing note reviewed.   Constitutional:       General: He is not in acute distress.     Appearance: He is well-developed.   HENT:      Head: Normocephalic.   Eyes:      General: Lids are normal.         Right eye: No discharge.         Left eye: No discharge.      Extraocular Movements: Extraocular movements intact.      Conjunctiva/sclera: Conjunctivae normal.      Pupils: Pupils are equal, round, and reactive to light.   Neck:      Thyroid: No thyroid mass or thyromegaly.      Trachea: Trachea normal.   Cardiovascular:      Rate and Rhythm: Normal rate and regular rhythm.      Heart sounds: Normal heart sounds.   Pulmonary:      Effort: Pulmonary effort is normal.      Breath sounds: Normal breath sounds. Rhonchi present.   Musculoskeletal:      Right shoulder: Tenderness present.      Cervical back: Normal range of motion.   Lymphadenopathy:      Cervical: No cervical adenopathy.   Skin:     General: Skin is warm and dry.   Neurological:      Mental Status: He is alert.   Psychiatric:         Attention and Perception: He is attentive.         Mood and Affect: Mood normal.         Speech: Speech normal.        Result Review :  The following data was reviewed by: Merlene Zamorano MD on 01/09/2024:  Common " labs          4/14/2023    11:06 6/12/2023    13:53 9/1/2023    13:04   Common Labs   Glucose 85  80  98    BUN 19  16  14    Creatinine 1.00  1.07  0.95    Sodium 138  139  137    Potassium 4.6  4.7  4.2    Chloride 101  101  100    Calcium 10.2  9.8  9.9    Albumin 4.7  4.8  4.8    Total Bilirubin 0.7  0.7  0.4    Alkaline Phosphatase 75  76  86    AST (SGOT) 23  25  31    ALT (SGPT) 39  37  57    WBC 7.58   9.29    Hemoglobin 16.6   16.5    Hematocrit 48.3   49.0    Platelets 370   423    Total Cholesterol  172     Triglycerides  141     HDL Cholesterol  39     LDL Cholesterol   108                    Assessment and Plan   Diagnoses and all orders for this visit:    1. Acute pain of right shoulder (Primary)  Assessment & Plan:  Apply a compressive ACE bandage. Rest and elevate the affected painful area.  Apply cold compresses intermittently as needed.  As pain recedes, begin normal activities slowly as tolerated.  Call if symptoms persist.      Orders:  -     XR Shoulder 2+ View Right; Future  -     Ambulatory Referral to Orthopedic Surgery    2. Acute URI  Assessment & Plan:  Tylenol 1-2 tabs po q4h prn  Mucinex over the counter as needed for cough or congestion.        3. Cough, unspecified type  -     POCT SARS-CoV-2 Antigen FELIX + Flu    4. Blurry vision, bilateral  Assessment & Plan:  He had an eye check up recently.  Advised to go back to his eye doctor if his symptoms continue.              I spent 30 minutes caring for Davian on this date of service. This time includes time spent by me in the following activities:reviewing tests, obtaining and/or reviewing a separately obtained history, performing a medically appropriate examination and/or evaluation , counseling and educating the patient/family/caregiver, ordering medications, tests, or procedures, documenting information in the medical record, independently interpreting results and communicating that information with the patient/family/caregiver, and  care coordination  Follow Up   No follow-ups on file.  Patient was given instructions and counseling regarding his condition or for health maintenance advice. Please see specific information pulled into the AVS if appropriate.

## 2024-01-10 ENCOUNTER — PATIENT ROUNDING (BHMG ONLY) (OUTPATIENT)
Dept: FAMILY MEDICINE CLINIC | Facility: CLINIC | Age: 39
End: 2024-01-10
Payer: MEDICAID

## 2024-01-12 ENCOUNTER — TELEPHONE (OUTPATIENT)
Dept: FAMILY MEDICINE CLINIC | Facility: CLINIC | Age: 39
End: 2024-01-12

## 2024-01-29 ENCOUNTER — HOSPITAL ENCOUNTER (OUTPATIENT)
Dept: GENERAL RADIOLOGY | Facility: HOSPITAL | Age: 39
Discharge: HOME | End: 2024-01-29
Admitting: FAMILY MEDICINE
Payer: MEDICAID

## 2024-01-29 ENCOUNTER — OFFICE (OUTPATIENT)
Dept: URBAN - METROPOLITAN AREA CLINIC 64 | Facility: CLINIC | Age: 39
End: 2024-01-29
Payer: MEDICAID

## 2024-01-29 VITALS
HEIGHT: 72 IN | DIASTOLIC BLOOD PRESSURE: 72 MMHG | SYSTOLIC BLOOD PRESSURE: 114 MMHG | WEIGHT: 230 LBS | HEART RATE: 85 BPM

## 2024-01-29 DIAGNOSIS — K62.5 HEMORRHAGE OF ANUS AND RECTUM: ICD-10-CM

## 2024-01-29 DIAGNOSIS — K58.2 MIXED IRRITABLE BOWEL SYNDROME: ICD-10-CM

## 2024-01-29 DIAGNOSIS — M25.511 ACUTE PAIN OF RIGHT SHOULDER: ICD-10-CM

## 2024-01-29 PROCEDURE — 73030 X-RAY EXAM OF SHOULDER: CPT

## 2024-01-29 PROCEDURE — 99214 OFFICE O/P EST MOD 30 MIN: CPT | Performed by: INTERNAL MEDICINE

## 2024-01-29 RX ORDER — DICYCLOMINE HYDROCHLORIDE 20 MG/1
40 TABLET ORAL
Qty: 180 | Refills: 3 | Status: ACTIVE

## 2024-01-30 NOTE — PROGRESS NOTES
Patient notified via voicemail. Advised to call back if he has any questions or concerns on 1/30 @ 1:40PM.

## 2024-03-25 ENCOUNTER — OFFICE VISIT (OUTPATIENT)
Dept: FAMILY MEDICINE CLINIC | Facility: CLINIC | Age: 39
End: 2024-03-25
Payer: MEDICAID

## 2024-03-25 VITALS
HEIGHT: 72 IN | DIASTOLIC BLOOD PRESSURE: 65 MMHG | WEIGHT: 233.1 LBS | BODY MASS INDEX: 31.57 KG/M2 | TEMPERATURE: 97.1 F | HEART RATE: 90 BPM | OXYGEN SATURATION: 95 % | SYSTOLIC BLOOD PRESSURE: 99 MMHG

## 2024-03-25 DIAGNOSIS — L85.3 XEROSIS OF SKIN: Primary | ICD-10-CM

## 2024-03-25 RX ORDER — NITROGLYCERIN 20 MG/G
OINTMENT TOPICAL
COMMUNITY
Start: 2024-01-30

## 2024-03-25 RX ORDER — CERAMIDE 1,3,6-II/SALICYLIC/B3
1 CLEANSER (ML) TOPICAL 2 TIMES DAILY
Start: 2024-03-25

## 2024-03-25 NOTE — PROGRESS NOTES
"Chief Complaint  Foot Pain (Heel area pain - x 4 days )    Subjective        Davian Arevalo presents to [facility]  Foot Pain  This is a new problem. The current episode started in the past 7 days. The problem has been unchanged. Pertinent negatives include no change in bowel habit, chest pain, chills, congestion, coughing, diaphoresis, fatigue or rash. Nothing aggravates the symptoms. Treatments tried: antibiotic ointment. The treatment provided no relief.     Review of Systems   Constitutional:  Negative for chills, diaphoresis and fatigue.   HENT:  Negative for congestion.    Respiratory:  Negative for cough.    Cardiovascular:  Negative for chest pain.   Gastrointestinal:  Negative for change in bowel habit.   Skin:  Negative for pallor, rash and wound.   All other systems reviewed and are negative.        Objective   Vital Signs:  BP 99/65 (BP Location: Left arm, Patient Position: Sitting, Cuff Size: Large Adult)   Pulse 90   Temp 97.1 °F (36.2 °C) (Infrared)   Ht 182.9 cm (72.01\")   Wt 106 kg (233 lb 1.6 oz)   SpO2 95%   BMI 31.61 kg/m²   Estimated body mass index is 31.61 kg/m² as calculated from the following:    Height as of this encounter: 182.9 cm (72.01\").    Weight as of this encounter: 106 kg (233 lb 1.6 oz).     BMI is >= 30 and <35. (Class 1 Obesity). The following options were offered after discussion;: exercise counseling/recommendations           Physical Exam  Vitals and nursing note reviewed.   Constitutional:       General: He is not in acute distress.     Appearance: He is well-developed.   HENT:      Head: Normocephalic.   Eyes:      General: Lids are normal.   Neck:      Thyroid: No thyroid mass or thyromegaly.      Trachea: Trachea normal.   Cardiovascular:      Rate and Rhythm: Normal rate and regular rhythm.      Heart sounds: Normal heart sounds.   Pulmonary:      Effort: Pulmonary effort is normal.      Breath sounds: Normal breath sounds. "   Musculoskeletal:      Cervical back: Normal range of motion.   Lymphadenopathy:      Cervical: No cervical adenopathy.   Skin:     General: Skin is warm and dry.   Neurological:      Mental Status: He is alert and oriented to person, place, and time. Mental status is at baseline.   Psychiatric:         Attention and Perception: He is attentive.         Mood and Affect: Mood normal.         Speech: Speech normal.         Behavior: Behavior normal.        Result Review :    The following data was reviewed by: Merlene Zamorano MD on 03/25/2024:  Common labs          4/14/2023    11:06 6/12/2023    13:53 9/1/2023    13:04   Common Labs   Glucose 85  80  98    BUN 19  16  14    Creatinine 1.00  1.07  0.95    Sodium 138  139  137    Potassium 4.6  4.7  4.2    Chloride 101  101  100    Calcium 10.2  9.8  9.9    Albumin 4.7  4.8  4.8    Total Bilirubin 0.7  0.7  0.4    Alkaline Phosphatase 75  76  86    AST (SGOT) 23  25  31    ALT (SGPT) 39  37  57    WBC 7.58   9.29    Hemoglobin 16.6   16.5    Hematocrit 48.3   49.0    Platelets 370   423    Total Cholesterol  172     Triglycerides  141     HDL Cholesterol  39     LDL Cholesterol   108                    Assessment and Plan     Diagnoses and all orders for this visit:    1. Xerosis of skin (Primary)  -     Emollient (CeraVe Moisturizing) cream; Apply 1 Application topically 2 (Two) Times a Day.             Follow Up     No follow-ups on file.  Patient was given instructions and counseling regarding his condition or for health maintenance advice. Please see specific information pulled into the AVS if appropriate.

## 2024-04-01 ENCOUNTER — LAB (OUTPATIENT)
Dept: FAMILY MEDICINE CLINIC | Facility: CLINIC | Age: 39
End: 2024-04-01
Payer: MEDICAID

## 2024-04-01 ENCOUNTER — OFFICE VISIT (OUTPATIENT)
Dept: FAMILY MEDICINE CLINIC | Facility: CLINIC | Age: 39
End: 2024-04-01
Payer: MEDICAID

## 2024-04-01 VITALS
TEMPERATURE: 98 F | SYSTOLIC BLOOD PRESSURE: 114 MMHG | HEART RATE: 93 BPM | BODY MASS INDEX: 31.15 KG/M2 | OXYGEN SATURATION: 96 % | HEIGHT: 72 IN | WEIGHT: 230 LBS | DIASTOLIC BLOOD PRESSURE: 80 MMHG

## 2024-04-01 DIAGNOSIS — R53.83 FATIGUE, UNSPECIFIED TYPE: ICD-10-CM

## 2024-04-01 DIAGNOSIS — M54.50 ACUTE MIDLINE LOW BACK PAIN WITHOUT SCIATICA: ICD-10-CM

## 2024-04-01 DIAGNOSIS — R22.1 NECK MASS: Primary | ICD-10-CM

## 2024-04-01 PROBLEM — R05.9 COUGH: Status: RESOLVED | Noted: 2023-09-01 | Resolved: 2024-04-01

## 2024-04-01 PROBLEM — R09.A2 FOREIGN BODY SENSATION IN THROAT: Status: RESOLVED | Noted: 2023-12-01 | Resolved: 2024-04-01

## 2024-04-01 LAB
BASOPHILS # BLD AUTO: 0.09 10*3/MM3 (ref 0–0.2)
BASOPHILS NFR BLD AUTO: 0.9 % (ref 0–1.5)
DEPRECATED RDW RBC AUTO: 40.9 FL (ref 37–54)
EOSINOPHIL # BLD AUTO: 0.39 10*3/MM3 (ref 0–0.4)
EOSINOPHIL NFR BLD AUTO: 3.7 % (ref 0.3–6.2)
ERYTHROCYTE [DISTWIDTH] IN BLOOD BY AUTOMATED COUNT: 12.7 % (ref 12.3–15.4)
HCT VFR BLD AUTO: 51.8 % (ref 37.5–51)
HGB BLD-MCNC: 17.6 G/DL (ref 13–17.7)
IMM GRANULOCYTES # BLD AUTO: 0.05 10*3/MM3 (ref 0–0.05)
IMM GRANULOCYTES NFR BLD AUTO: 0.5 % (ref 0–0.5)
LYMPHOCYTES # BLD AUTO: 2.48 10*3/MM3 (ref 0.7–3.1)
LYMPHOCYTES NFR BLD AUTO: 23.6 % (ref 19.6–45.3)
MCH RBC QN AUTO: 30 PG (ref 26.6–33)
MCHC RBC AUTO-ENTMCNC: 34 G/DL (ref 31.5–35.7)
MCV RBC AUTO: 88.2 FL (ref 79–97)
MONOCYTES # BLD AUTO: 1.24 10*3/MM3 (ref 0.1–0.9)
MONOCYTES NFR BLD AUTO: 11.8 % (ref 5–12)
NEUTROPHILS NFR BLD AUTO: 59.5 % (ref 42.7–76)
NEUTROPHILS NFR BLD AUTO: 6.28 10*3/MM3 (ref 1.7–7)
NRBC BLD AUTO-RTO: 0 /100 WBC (ref 0–0.2)
PLATELET # BLD AUTO: 404 10*3/MM3 (ref 140–450)
PMV BLD AUTO: 9.7 FL (ref 6–12)
RBC # BLD AUTO: 5.87 10*6/MM3 (ref 4.14–5.8)
WBC NRBC COR # BLD AUTO: 10.53 10*3/MM3 (ref 3.4–10.8)

## 2024-04-01 PROCEDURE — 36415 COLL VENOUS BLD VENIPUNCTURE: CPT | Performed by: FAMILY MEDICINE

## 2024-04-01 PROCEDURE — 80053 COMPREHEN METABOLIC PANEL: CPT | Performed by: FAMILY MEDICINE

## 2024-04-01 PROCEDURE — 85025 COMPLETE CBC W/AUTO DIFF WBC: CPT | Performed by: FAMILY MEDICINE

## 2024-04-01 RX ORDER — MELOXICAM 15 MG/1
15 TABLET ORAL DAILY
Qty: 90 TABLET | Refills: 1 | Status: SHIPPED | OUTPATIENT
Start: 2024-04-01

## 2024-04-01 NOTE — PROGRESS NOTES
"Chief Complaint  Back Pain, Neck Pain (X 3 days ), and Fatigue    Subjective        Davian Arevalo presents to [facility]  History of Present Illness  He feels like he has had swollen, tender lymph nodes in different areas to different degrees for the last year.  He notices this mostly around is neck. He has had some dental problems.  He says he sees the dentist regularly.  He has had intermittent ear problems - congestion and pain.   Lower back area pain.  No known fever or chills. Post-nasal draingage.   Back Pain  This is a recurrent problem. The current episode started 1 to 4 weeks ago. The problem occurs daily. The problem has been comes and goes since onset. The pain is present in the lumbar spine and thoracic spine. The quality of the pain is described as aching.   Neck Pain     Fatigue  This is a new problem. The current episode started 1 to 4 weeks ago. The problem occurs daily. The problem has been unchanged. Associated symptoms include fatigue and neck pain.     Review of Systems   Constitutional:  Positive for fatigue.   Musculoskeletal:  Positive for back pain and neck pain.   All other systems reviewed and are negative.     Objective   Vital Signs:  /80 (BP Location: Left arm, Patient Position: Sitting, Cuff Size: Large Adult)   Pulse 93   Temp 98 °F (36.7 °C) (Infrared)   Ht 182.9 cm (72.01\")   Wt 104 kg (230 lb)   SpO2 96%   BMI 31.18 kg/m²   Estimated body mass index is 31.18 kg/m² as calculated from the following:    Height as of this encounter: 182.9 cm (72.01\").    Weight as of this encounter: 104 kg (230 lb).     BMI is >= 30 and <35. (Class 1 Obesity). The following options were offered after discussion;: exercise counseling/recommendations           Physical Exam  Vitals and nursing note reviewed.   Constitutional:       General: He is not in acute distress.     Appearance: He is well-developed.   HENT:      Head: Normocephalic.      Right Ear: " Tympanic membrane normal.      Left Ear: Tympanic membrane normal.      Nose: Congestion present.      Mouth/Throat:      Mouth: Mucous membranes are moist.   Eyes:      General: Lids are normal.      Conjunctiva/sclera: Conjunctivae normal.   Neck:      Thyroid: No thyroid mass or thyromegaly.      Trachea: Trachea normal.   Cardiovascular:      Rate and Rhythm: Normal rate and regular rhythm.      Heart sounds: Normal heart sounds.   Pulmonary:      Effort: Pulmonary effort is normal.      Breath sounds: Normal breath sounds.   Musculoskeletal:      Cervical back: Normal range of motion. Tenderness present.   Lymphadenopathy:      Cervical: Cervical adenopathy present.   Skin:     General: Skin is warm and dry.   Neurological:      Mental Status: He is alert and oriented to person, place, and time.   Psychiatric:         Attention and Perception: He is attentive.         Mood and Affect: Mood normal.         Speech: Speech normal.         Behavior: Behavior normal.        Result Review :    The following data was reviewed by: Merlene Zamorano MD on 04/01/2024:  Common labs          4/14/2023    11:06 6/12/2023    13:53 9/1/2023    13:04   Common Labs   Glucose 85  80  98    BUN 19  16  14    Creatinine 1.00  1.07  0.95    Sodium 138  139  137    Potassium 4.6  4.7  4.2    Chloride 101  101  100    Calcium 10.2  9.8  9.9    Albumin 4.7  4.8  4.8    Total Bilirubin 0.7  0.7  0.4    Alkaline Phosphatase 75  76  86    AST (SGOT) 23  25  31    ALT (SGPT) 39  37  57    WBC 7.58   9.29    Hemoglobin 16.6   16.5    Hematocrit 48.3   49.0    Platelets 370   423    Total Cholesterol  172     Triglycerides  141     HDL Cholesterol  39     LDL Cholesterol   108                    Assessment and Plan     Diagnoses and all orders for this visit:    1. Neck mass (Primary)  -     US Head Neck Soft Tissue; Future  -     CBC & Differential  -     Comprehensive Metabolic Panel    2. Fatigue, unspecified type  -     US Head Neck  Soft Tissue; Future  -     CBC & Differential  -     Comprehensive Metabolic Panel    3. Acute midline low back pain without sciatica  -     meloxicam (Mobic) 15 MG tablet; Take 1 tablet by mouth Daily.  Dispense: 90 tablet; Refill: 1             Follow Up     No follow-ups on file.  Patient was given instructions and counseling regarding his condition or for health maintenance advice. Please see specific information pulled into the AVS if appropriate.

## 2024-04-02 LAB
ALBUMIN SERPL-MCNC: 4.5 G/DL (ref 3.5–5.2)
ALBUMIN/GLOB SERPL: 1.3 G/DL
ALP SERPL-CCNC: 91 U/L (ref 39–117)
ALT SERPL W P-5'-P-CCNC: 82 U/L (ref 1–41)
ANION GAP SERPL CALCULATED.3IONS-SCNC: 10.6 MMOL/L (ref 5–15)
AST SERPL-CCNC: 43 U/L (ref 1–40)
BILIRUB SERPL-MCNC: 0.5 MG/DL (ref 0–1.2)
BUN SERPL-MCNC: 18 MG/DL (ref 6–20)
BUN/CREAT SERPL: 18.9 (ref 7–25)
CALCIUM SPEC-SCNC: 10.1 MG/DL (ref 8.6–10.5)
CHLORIDE SERPL-SCNC: 101 MMOL/L (ref 98–107)
CO2 SERPL-SCNC: 24.4 MMOL/L (ref 22–29)
CREAT SERPL-MCNC: 0.95 MG/DL (ref 0.76–1.27)
EGFRCR SERPLBLD CKD-EPI 2021: 103.8 ML/MIN/1.73
GLOBULIN UR ELPH-MCNC: 3.5 GM/DL
GLUCOSE SERPL-MCNC: 80 MG/DL (ref 65–99)
POTASSIUM SERPL-SCNC: 4.9 MMOL/L (ref 3.5–5.2)
PROT SERPL-MCNC: 8 G/DL (ref 6–8.5)
SODIUM SERPL-SCNC: 136 MMOL/L (ref 136–145)

## 2024-04-02 NOTE — PROGRESS NOTES
Patient notified via voicemail and advised to call us back if he has any questions or concerns on 4/2 @ 11:10AM.

## 2024-04-05 ENCOUNTER — HOSPITAL ENCOUNTER (OUTPATIENT)
Dept: ULTRASOUND IMAGING | Facility: HOSPITAL | Age: 39
Discharge: HOME | End: 2024-04-05
Payer: MEDICAID

## 2024-04-05 DIAGNOSIS — R22.1 NECK MASS: ICD-10-CM

## 2024-04-05 DIAGNOSIS — R53.83 FATIGUE, UNSPECIFIED TYPE: ICD-10-CM

## 2024-04-05 PROCEDURE — 76536 US EXAM OF HEAD AND NECK: CPT

## 2024-04-08 ENCOUNTER — TELEPHONE (OUTPATIENT)
Dept: FAMILY MEDICINE CLINIC | Facility: CLINIC | Age: 39
End: 2024-04-08

## 2024-04-08 DIAGNOSIS — R74.8 ELEVATED LIVER ENZYMES: Primary | ICD-10-CM

## 2024-04-22 ENCOUNTER — OFFICE (OUTPATIENT)
Dept: URBAN - METROPOLITAN AREA CLINIC 64 | Facility: CLINIC | Age: 39
End: 2024-04-22
Payer: MEDICAID

## 2024-04-22 VITALS
WEIGHT: 231 LBS | HEART RATE: 69 BPM | DIASTOLIC BLOOD PRESSURE: 100 MMHG | SYSTOLIC BLOOD PRESSURE: 137 MMHG | HEIGHT: 72 IN

## 2024-04-22 DIAGNOSIS — R79.89 OTHER SPECIFIED ABNORMAL FINDINGS OF BLOOD CHEMISTRY: ICD-10-CM

## 2024-04-22 DIAGNOSIS — K58.1 IRRITABLE BOWEL SYNDROME WITH CONSTIPATION: ICD-10-CM

## 2024-04-22 PROCEDURE — 99214 OFFICE O/P EST MOD 30 MIN: CPT | Performed by: NURSE PRACTITIONER

## 2024-04-25 ENCOUNTER — TRANSCRIBE ORDERS (OUTPATIENT)
Dept: ADMINISTRATIVE | Facility: HOSPITAL | Age: 39
End: 2024-04-25
Payer: MEDICAID

## 2024-04-25 DIAGNOSIS — R79.89 ELEVATED LFTS: ICD-10-CM

## 2024-04-25 DIAGNOSIS — K58.9 IRRITABLE BOWEL SYNDROME, UNSPECIFIED TYPE: ICD-10-CM

## 2024-04-25 DIAGNOSIS — K59.00 CONSTIPATION, UNSPECIFIED CONSTIPATION TYPE: Primary | ICD-10-CM

## 2024-04-29 ENCOUNTER — OFFICE VISIT (OUTPATIENT)
Dept: CARDIOLOGY | Facility: CLINIC | Age: 39
End: 2024-04-29
Payer: MEDICAID

## 2024-04-29 VITALS
SYSTOLIC BLOOD PRESSURE: 109 MMHG | WEIGHT: 230.5 LBS | HEIGHT: 72 IN | HEART RATE: 80 BPM | DIASTOLIC BLOOD PRESSURE: 70 MMHG | BODY MASS INDEX: 31.22 KG/M2 | OXYGEN SATURATION: 97 %

## 2024-04-29 DIAGNOSIS — I10 PRIMARY HYPERTENSION: ICD-10-CM

## 2024-04-29 DIAGNOSIS — R00.2 PALPITATIONS: Primary | ICD-10-CM

## 2024-04-29 PROCEDURE — 99213 OFFICE O/P EST LOW 20 MIN: CPT | Performed by: INTERNAL MEDICINE

## 2024-04-29 PROCEDURE — 1159F MED LIST DOCD IN RCRD: CPT | Performed by: INTERNAL MEDICINE

## 2024-04-29 PROCEDURE — 1160F RVW MEDS BY RX/DR IN RCRD: CPT | Performed by: INTERNAL MEDICINE

## 2024-04-29 PROCEDURE — 3078F DIAST BP <80 MM HG: CPT | Performed by: INTERNAL MEDICINE

## 2024-04-29 PROCEDURE — 3074F SYST BP LT 130 MM HG: CPT | Performed by: INTERNAL MEDICINE

## 2024-04-29 NOTE — PROGRESS NOTES
"    Subjective:     Encounter Date:04/29/2024      Patient ID: Davian Arevalo is a 38 y.o. male.    Chief Complaint:  History of Present Illness 38-year-old white male with history of hypertension and palpitations presents to the office for follow-up.  Patient is currently stable without any symptoms of chest pain or shortness of breath at rest or exertion.  No complaint of any PND or orthopnea.  Patient has occasional palpitations without any dizziness syncope or swelling of the feet.  Patient is taking all her medicines regularly.  Patient does not smoke.    The following portions of the patient's history were reviewed and updated as appropriate: allergies, current medications, past family history, past medical history, past social history, past surgical history, and problem list.  Past Medical History:   Diagnosis Date    Abdominal pain, chronic, right lower quadrant 01/30/2015    Anxiety     Autism 08/13/2014    mild ( Abstracted from [facility].)    Carpal tunnel syndrome 02/22/2018    Constipation 09/21/2018    Depression     Dyslexia 12/26/2017    Hypertension     IBS (irritable bowel syndrome) 09/21/2018    IBS (irritable bowel syndrome)     Knee pain, left 07/25/2017    Lower back pain 01/30/2015    Right cervical radiculopathy 01/07/2019    Shoulder pain, right 02/14/2017    Tinnitus     Vitamin D deficiency 12/26/2017     Past Surgical History:   Procedure Laterality Date    ADENOIDECTOMY      COLONOSCOPY      CYSTOSCOPY, URETEROSCOPY, RETROGRADE PYELOGRAM, STENT INSERTION Right 12/15/2020    Procedure: CYSTOSCOPY URETEROSCOPY STONE BASKET EXTRACTION AND STENT INSERTION;  Surgeon: Gerry Giron MD;  Location: [facility];  Service: Urology;  Laterality: Right;    TONSILLECTOMY  1993    Tonsillectomy and Adenoids-    TONSILLECTOMY AND ADENOIDECTOMY       /70   Pulse 80   Ht 182.9 cm (72\")   Wt 105 kg (230 lb 8 oz)   SpO2 97%   BMI 31.26 kg/m²   Family History   Problem Relation Age of " Onset    Cancer Mother         breast    Hypertension Mother     Atrial fibrillation Mother     Cancer Father     Hypertension Father     Stroke Father     Hyperlipidemia Father     Diabetes Paternal Uncle     Diabetes Maternal Grandmother     Other Maternal Grandfather         Parkinson's disease    Stroke Maternal Grandfather     Diabetes Maternal Grandfather     Stroke Other        Current Outpatient Medications:     atenolol (TENORMIN) 25 MG tablet, TAKE ONE TABLET BY MOUTH ONCE NIGHTLY, Disp: 90 tablet, Rfl: 2    cholecalciferol (VITAMIN D3) 1000 units tablet, Take 1 tablet by mouth Daily., Disp: , Rfl:     dicyclomine (BENTYL) 20 MG tablet, Take 1 tablet by mouth 3 (Three) Times a Day., Disp: , Rfl:     Emollient (CeraVe Moisturizing) cream, Apply 1 Application topically 2 (Two) Times a Day., Disp: , Rfl:     folic acid-vit B6-vit B12 (FOLBEE) 2.5-25-1 MG tablet tablet, Take  by mouth Daily., Disp: , Rfl:     hydrocortisone 2.5 % cream, Apply 1 application topically to the appropriate area as directed 2 (Two) Times a Day., Disp: 28 g, Rfl: 1    lisinopril (PRINIVIL,ZESTRIL) 20 MG tablet, TAKE ONE TABLET BY MOUTH DAILY, Disp: 90 tablet, Rfl: 3    meloxicam (Mobic) 15 MG tablet, Take 1 tablet by mouth Daily., Disp: 90 tablet, Rfl: 1    mometasone (ELOCON) 0.1 % ointment, Apply 1 Application topically to the appropriate area as directed Daily., Disp: , Rfl:     Nitro-Bid 2 % ointment, , Disp: , Rfl:     nortriptyline (PAMELOR) 50 MG capsule, Take 2 capsules by mouth Every Night., Disp: , Rfl:     vitamin B-12 (CYANOCOBALAMIN) 1000 MCG tablet, Take 1 tablet by mouth Daily., Disp: , Rfl:   Allergies   Allergen Reactions    Ceclor [Cefaclor] Hives    Amoxicillin Hives    Cephalexin Hives     Tolerated CTX 12/2020 admission    Cloral Hives     Social History     Socioeconomic History    Marital status: Single   Tobacco Use    Smoking status: Never     Passive exposure: Never    Smokeless tobacco: Never   Vaping Use     Vaping status: Never Used   Substance and Sexual Activity    Alcohol use: Never    Drug use: No     Review of Systems   Constitutional: Positive for malaise/fatigue.   Cardiovascular:  Positive for palpitations. Negative for chest pain, dyspnea on exertion and leg swelling.   Respiratory:  Negative for cough and shortness of breath.    Gastrointestinal:  Negative for abdominal pain, nausea and vomiting.   Neurological:  Negative for dizziness, focal weakness, headaches, light-headedness and numbness.   All other systems reviewed and are negative.             Objective:     Constitutional:       Appearance: Well-developed.   Eyes:      General: No scleral icterus.     Conjunctiva/sclera: Conjunctivae normal.   HENT:      Head: Normocephalic and atraumatic.   Neck:      Vascular: No carotid bruit or JVD.   Pulmonary:      Effort: Pulmonary effort is normal.      Breath sounds: Normal breath sounds. No wheezing. No rales.   Cardiovascular:      Normal rate. Regular rhythm.   Pulses:     Intact distal pulses.   Abdominal:      General: Bowel sounds are normal.      Palpations: Abdomen is soft.   Musculoskeletal:      Cervical back: Normal range of motion and neck supple. Skin:     General: Skin is warm and dry.      Findings: No rash.   Neurological:      Mental Status: Alert.       Procedures    Lab Review:         MDM    #1 palpitation  Patient has history of palpitations and is currently stable on medications including atenolol  2.  Hypertension  Patient blood pressure actually lower side and is on atenolol and lisinopril and will adjust his medicines according to his blood pressure readings from home.    Patient's previous medical records, labs, and EKG were reviewed and discussed with the patient at today's visit.

## 2024-05-10 ENCOUNTER — HOSPITAL ENCOUNTER (OUTPATIENT)
Dept: ULTRASOUND IMAGING | Facility: HOSPITAL | Age: 39
Discharge: HOME | End: 2024-05-10
Payer: MEDICAID

## 2024-05-10 DIAGNOSIS — K58.9 IRRITABLE BOWEL SYNDROME, UNSPECIFIED TYPE: ICD-10-CM

## 2024-05-10 DIAGNOSIS — R79.89 ELEVATED LFTS: ICD-10-CM

## 2024-05-10 DIAGNOSIS — K59.00 CONSTIPATION, UNSPECIFIED CONSTIPATION TYPE: ICD-10-CM

## 2024-05-10 PROCEDURE — 76705 ECHO EXAM OF ABDOMEN: CPT

## 2024-06-10 ENCOUNTER — OFFICE VISIT (OUTPATIENT)
Dept: FAMILY MEDICINE CLINIC | Facility: CLINIC | Age: 39
End: 2024-06-10
Payer: MEDICAID

## 2024-06-10 VITALS
OXYGEN SATURATION: 97 % | HEIGHT: 72 IN | TEMPERATURE: 98.6 F | SYSTOLIC BLOOD PRESSURE: 112 MMHG | DIASTOLIC BLOOD PRESSURE: 77 MMHG | HEART RATE: 71 BPM | WEIGHT: 231.2 LBS | BODY MASS INDEX: 31.31 KG/M2

## 2024-06-10 DIAGNOSIS — R42 DIZZINESS: Primary | ICD-10-CM

## 2024-06-10 DIAGNOSIS — L90.5 SCAR: ICD-10-CM

## 2024-06-10 DIAGNOSIS — I10 PRIMARY HYPERTENSION: ICD-10-CM

## 2024-06-10 PROCEDURE — 3078F DIAST BP <80 MM HG: CPT | Performed by: FAMILY MEDICINE

## 2024-06-10 PROCEDURE — 1125F AMNT PAIN NOTED PAIN PRSNT: CPT | Performed by: FAMILY MEDICINE

## 2024-06-10 PROCEDURE — 99213 OFFICE O/P EST LOW 20 MIN: CPT | Performed by: FAMILY MEDICINE

## 2024-06-10 PROCEDURE — 1159F MED LIST DOCD IN RCRD: CPT | Performed by: FAMILY MEDICINE

## 2024-06-10 PROCEDURE — 1160F RVW MEDS BY RX/DR IN RCRD: CPT | Performed by: FAMILY MEDICINE

## 2024-06-10 PROCEDURE — 3074F SYST BP LT 130 MM HG: CPT | Performed by: FAMILY MEDICINE

## 2024-06-10 NOTE — ASSESSMENT & PLAN NOTE
Hypertension is stable and controlled  Continue current treatment regimen.  He has been advised to stop the lisinopril by his cardiologist due to relatively low blood pressure but has continued to take it.  I agree that if he continues to have dizziness he needs to stop this medicine.   Blood pressure will be reassessed in 6 months.

## 2024-06-10 NOTE — PROGRESS NOTES
"Chief Complaint  Rash (Buttock area )    Subjective        Davian Arevalo presents to [facility]  History of Present Illness  He noticed two small purple spots - one on his lower abdomen and one on his left buttock.  Both of these spots are in areas where he has previously had pimples.   Rash  This is a new problem. The problem is unchanged. The affected locations include the abdomen and left buttock. He was exposed to nothing. Pertinent negatives include no anorexia, congestion, cough, fatigue or fever. Past treatments include nothing.   Dizziness  Associated symptoms include a rash. Pertinent negatives include no anorexia, congestion, coughing, fatigue or fever.       Objective   Vital Signs:  /77 (BP Location: Left arm, Patient Position: Sitting, Cuff Size: Large Adult)   Pulse 71   Temp 98.6 °F (37 °C) (Infrared)   Ht 182.9 cm (72\")   Wt 105 kg (231 lb 3.2 oz)   SpO2 97%   BMI 31.36 kg/m²   Estimated body mass index is 31.36 kg/m² as calculated from the following:    Height as of this encounter: 182.9 cm (72\").    Weight as of this encounter: 105 kg (231 lb 3.2 oz).               Physical Exam  Vitals and nursing note reviewed.   Constitutional:       General: He is not in acute distress.     Appearance: He is well-developed.   HENT:      Head: Normocephalic.   Eyes:      General: Lids are normal.      Conjunctiva/sclera: Conjunctivae normal.   Neck:      Thyroid: No thyroid mass or thyromegaly.      Trachea: Trachea normal.   Cardiovascular:      Rate and Rhythm: Normal rate and regular rhythm.      Heart sounds: Normal heart sounds.   Pulmonary:      Effort: Pulmonary effort is normal.      Breath sounds: Normal breath sounds.   Musculoskeletal:      Cervical back: Normal range of motion.   Lymphadenopathy:      Cervical: No cervical adenopathy.   Skin:     General: Skin is warm and dry.      Comments: Two small areas of purple discoloration - 1 on lower abdomen " and 1 on upper left buttock   Neurological:      Mental Status: He is alert and oriented to person, place, and time.   Psychiatric:         Attention and Perception: He is attentive.         Mood and Affect: Mood normal.         Speech: Speech normal.         Behavior: Behavior normal.        Result Review :    The following data was reviewed by: Merlene Zamorano MD on 06/10/2024:  Common labs          9/1/2023    13:04 4/1/2024    15:27   Common Labs   Glucose 98  80    BUN 14  18    Creatinine 0.95  0.95    Sodium 137  136    Potassium 4.2  4.9    Chloride 100  101    Calcium 9.9  10.1    Albumin 4.8  4.5    Total Bilirubin 0.4  0.5    Alkaline Phosphatase 86  91    AST (SGOT) 31  43    ALT (SGPT) 57  82    WBC 9.29  10.53    Hemoglobin 16.5  17.6    Hematocrit 49.0  51.8    Platelets 423  404                   Assessment and Plan     Diagnoses and all orders for this visit:    1. Dizziness (Primary)    2. Scar    3. Primary hypertension  Assessment & Plan:  Hypertension is stable and controlled  Continue current treatment regimen.  He has been advised to stop the lisinopril by his cardiologist due to relatively low blood pressure but has continued to take it.  I agree that if he continues to have dizziness he needs to stop this medicine.   Blood pressure will be reassessed in 6 months.               Follow Up     No follow-ups on file.  Patient was given instructions and counseling regarding his condition or for health maintenance advice. Please see specific information pulled into the AVS if appropriate.

## 2024-07-24 ENCOUNTER — OFFICE (OUTPATIENT)
Dept: URBAN - METROPOLITAN AREA CLINIC 64 | Facility: CLINIC | Age: 39
End: 2024-07-24
Payer: MEDICAID

## 2024-07-24 VITALS
WEIGHT: 227 LBS | HEIGHT: 72 IN | HEART RATE: 81 BPM | SYSTOLIC BLOOD PRESSURE: 114 MMHG | DIASTOLIC BLOOD PRESSURE: 78 MMHG

## 2024-07-24 DIAGNOSIS — K62.5 HEMORRHAGE OF ANUS AND RECTUM: ICD-10-CM

## 2024-07-24 DIAGNOSIS — R19.4 CHANGE IN BOWEL HABIT: ICD-10-CM

## 2024-07-24 DIAGNOSIS — R79.89 OTHER SPECIFIED ABNORMAL FINDINGS OF BLOOD CHEMISTRY: ICD-10-CM

## 2024-07-24 PROCEDURE — 99213 OFFICE O/P EST LOW 20 MIN: CPT

## 2024-09-09 RX ORDER — LISINOPRIL 20 MG/1
20 TABLET ORAL DAILY
Qty: 90 TABLET | Refills: 3 | Status: SHIPPED | OUTPATIENT
Start: 2024-09-09

## 2024-09-16 ENCOUNTER — OFFICE VISIT (OUTPATIENT)
Dept: FAMILY MEDICINE CLINIC | Facility: CLINIC | Age: 39
End: 2024-09-16
Payer: MEDICAID

## 2024-09-16 VITALS
WEIGHT: 220 LBS | HEIGHT: 72 IN | HEART RATE: 80 BPM | SYSTOLIC BLOOD PRESSURE: 120 MMHG | DIASTOLIC BLOOD PRESSURE: 83 MMHG | BODY MASS INDEX: 29.8 KG/M2 | RESPIRATION RATE: 16 BRPM | TEMPERATURE: 97.2 F | OXYGEN SATURATION: 97 %

## 2024-09-16 DIAGNOSIS — R30.0 DYSURIA: Primary | ICD-10-CM

## 2024-09-16 DIAGNOSIS — R11.0 NAUSEA: ICD-10-CM

## 2024-09-16 DIAGNOSIS — M54.50 LOW BACK PAIN WITHOUT SCIATICA, UNSPECIFIED BACK PAIN LATERALITY, UNSPECIFIED CHRONICITY: ICD-10-CM

## 2024-09-16 PROCEDURE — 3074F SYST BP LT 130 MM HG: CPT | Performed by: FAMILY MEDICINE

## 2024-09-16 PROCEDURE — 99214 OFFICE O/P EST MOD 30 MIN: CPT | Performed by: FAMILY MEDICINE

## 2024-09-16 PROCEDURE — 1159F MED LIST DOCD IN RCRD: CPT | Performed by: FAMILY MEDICINE

## 2024-09-16 PROCEDURE — 3079F DIAST BP 80-89 MM HG: CPT | Performed by: FAMILY MEDICINE

## 2024-09-16 PROCEDURE — 1160F RVW MEDS BY RX/DR IN RCRD: CPT | Performed by: FAMILY MEDICINE

## 2024-09-16 PROCEDURE — 1125F AMNT PAIN NOTED PAIN PRSNT: CPT | Performed by: FAMILY MEDICINE

## 2024-09-16 RX ORDER — CIPROFLOXACIN 500 MG/1
500 TABLET, FILM COATED ORAL 2 TIMES DAILY
Qty: 14 TABLET | Refills: 0 | Status: SHIPPED | OUTPATIENT
Start: 2024-09-16 | End: 2024-09-23

## 2024-09-16 RX ORDER — ONDANSETRON 4 MG/1
4 TABLET, ORALLY DISINTEGRATING ORAL EVERY 8 HOURS PRN
Qty: 20 TABLET | Refills: 0 | Status: SHIPPED | OUTPATIENT
Start: 2024-09-16

## 2024-09-25 ENCOUNTER — OFFICE VISIT (OUTPATIENT)
Dept: FAMILY MEDICINE CLINIC | Facility: CLINIC | Age: 39
End: 2024-09-25
Payer: MEDICAID

## 2024-09-25 VITALS
HEART RATE: 85 BPM | HEIGHT: 72 IN | WEIGHT: 222.6 LBS | TEMPERATURE: 98.2 F | OXYGEN SATURATION: 95 % | SYSTOLIC BLOOD PRESSURE: 109 MMHG | DIASTOLIC BLOOD PRESSURE: 75 MMHG | BODY MASS INDEX: 30.15 KG/M2

## 2024-09-25 DIAGNOSIS — N48.1 BALANITIS: Primary | ICD-10-CM

## 2024-09-25 PROCEDURE — 1125F AMNT PAIN NOTED PAIN PRSNT: CPT | Performed by: FAMILY MEDICINE

## 2024-09-25 PROCEDURE — 3074F SYST BP LT 130 MM HG: CPT | Performed by: FAMILY MEDICINE

## 2024-09-25 PROCEDURE — 3078F DIAST BP <80 MM HG: CPT | Performed by: FAMILY MEDICINE

## 2024-09-25 PROCEDURE — 99213 OFFICE O/P EST LOW 20 MIN: CPT | Performed by: FAMILY MEDICINE

## 2024-09-25 RX ORDER — MUPIROCIN CALCIUM 20 MG/G
1 CREAM TOPICAL 2 TIMES DAILY
Qty: 30 G | Refills: 0 | Status: SHIPPED | OUTPATIENT
Start: 2024-09-25

## 2024-11-05 ENCOUNTER — OFFICE VISIT (OUTPATIENT)
Dept: FAMILY MEDICINE CLINIC | Facility: CLINIC | Age: 39
End: 2024-11-05
Payer: MEDICAID

## 2024-11-05 VITALS
SYSTOLIC BLOOD PRESSURE: 111 MMHG | BODY MASS INDEX: 30.08 KG/M2 | HEART RATE: 78 BPM | WEIGHT: 222.1 LBS | DIASTOLIC BLOOD PRESSURE: 76 MMHG | HEIGHT: 72 IN | OXYGEN SATURATION: 97 % | TEMPERATURE: 97.8 F

## 2024-11-05 DIAGNOSIS — R82.4 KETONURIA: ICD-10-CM

## 2024-11-05 DIAGNOSIS — R30.0 DYSURIA: Primary | ICD-10-CM

## 2024-11-05 DIAGNOSIS — R42 DIZZINESS: ICD-10-CM

## 2024-11-05 LAB
BILIRUB BLD-MCNC: NEGATIVE MG/DL
CLARITY, POC: CLEAR
COLOR UR: ABNORMAL
EXPIRATION DATE: ABNORMAL
GLUCOSE UR STRIP-MCNC: NEGATIVE MG/DL
KETONES UR QL: ABNORMAL
LEUKOCYTE EST, POC: NEGATIVE
Lab: ABNORMAL
NITRITE UR-MCNC: NEGATIVE MG/ML
PH UR: 6 [PH] (ref 5–8)
PROT UR STRIP-MCNC: NEGATIVE MG/DL
RBC # UR STRIP: NEGATIVE /UL
SP GR UR: 1.03 (ref 1–1.03)
UROBILINOGEN UR QL: ABNORMAL

## 2024-11-05 PROCEDURE — 3074F SYST BP LT 130 MM HG: CPT | Performed by: FAMILY MEDICINE

## 2024-11-05 PROCEDURE — 99212 OFFICE O/P EST SF 10 MIN: CPT | Performed by: FAMILY MEDICINE

## 2024-11-05 PROCEDURE — 1160F RVW MEDS BY RX/DR IN RCRD: CPT | Performed by: FAMILY MEDICINE

## 2024-11-05 PROCEDURE — 3078F DIAST BP <80 MM HG: CPT | Performed by: FAMILY MEDICINE

## 2024-11-05 PROCEDURE — 1159F MED LIST DOCD IN RCRD: CPT | Performed by: FAMILY MEDICINE

## 2024-11-05 PROCEDURE — 1125F AMNT PAIN NOTED PAIN PRSNT: CPT | Performed by: FAMILY MEDICINE

## 2024-11-05 NOTE — PROGRESS NOTES
"Chief Complaint  Dysuria (X 1 week )    Subjective        Davian Arevalo presents to [facility]  Dysuria   This is a recurrent problem. The current episode started in the past 7 days. The problem occurs daily. The problem has been worse. The quality of the pain is described as burning. The pain is mild. There has been no fever. There is no history of pyelonephritis. Pertinent negatives include no chills, discharge, flank pain, frequency, hematuria or nausea. Associated symptoms comments: Headache, constipation. There is no history of kidney stones or a single kidney.       Objective   Vital Signs:  /76 (BP Location: Left arm, Patient Position: Sitting, Cuff Size: Large Adult)   Pulse 78   Temp 97.8 °F (36.6 °C) (Infrared)   Ht 182.9 cm (72\")   Wt 101 kg (222 lb 1.6 oz)   SpO2 97%   BMI 30.12 kg/m²   Estimated body mass index is 30.12 kg/m² as calculated from the following:    Height as of this encounter: 182.9 cm (72\").    Weight as of this encounter: 101 kg (222 lb 1.6 oz).            Physical Exam  Vitals and nursing note reviewed.   Constitutional:       General: He is not in acute distress.     Appearance: He is well-developed.   HENT:      Head: Normocephalic.   Eyes:      General: Lids are normal.      Conjunctiva/sclera: Conjunctivae normal.   Neck:      Thyroid: No thyroid mass or thyromegaly.      Trachea: Trachea normal.   Cardiovascular:      Rate and Rhythm: Normal rate and regular rhythm.      Heart sounds: Normal heart sounds.   Pulmonary:      Effort: Pulmonary effort is normal.      Breath sounds: Normal breath sounds.   Abdominal:      Palpations: Abdomen is soft.   Musculoskeletal:      Cervical back: Normal range of motion.   Lymphadenopathy:      Cervical: No cervical adenopathy.   Skin:     General: Skin is warm and dry.   Neurological:      Mental Status: He is alert and oriented to person, place, and time.   Psychiatric:         Attention and " Perception: He is attentive.         Mood and Affect: Mood normal.         Speech: Speech normal.         Behavior: Behavior normal.        Result Review :  The following data was reviewed by: Merlene Zamorano MD on 11/05/2024:  Common labs          4/1/2024    15:27   Common Labs   Glucose 80    BUN 18    Creatinine 0.95    Sodium 136    Potassium 4.9    Chloride 101    Calcium 10.1    Albumin 4.5    Total Bilirubin 0.5    Alkaline Phosphatase 91    AST (SGOT) 43    ALT (SGPT) 82    WBC 10.53    Hemoglobin 17.6    Hematocrit 51.8    Platelets 404                Assessment and Plan   Diagnoses and all orders for this visit:    1. Dysuria (Primary)  Assessment & Plan:  No sign of infection.  Follow up with Dr. Giron or Dr. Mcduffie if symptoms continue.     Orders:  -     POCT urinalysis dipstick, automated    2. Ketonuria  Assessment & Plan:  Push water intake.        3. Dizziness  Assessment & Plan:  Push fluid intake               Follow Up   No follow-ups on file.  Patient was given instructions and counseling regarding his condition or for health maintenance advice. Please see specific information pulled into the AVS if appropriate.

## 2024-11-11 RX ORDER — ATENOLOL 25 MG/1
TABLET ORAL
Qty: 90 TABLET | Refills: 2 | Status: SHIPPED | OUTPATIENT
Start: 2024-11-11

## 2024-12-13 ENCOUNTER — APPOINTMENT (OUTPATIENT)
Dept: GENERAL RADIOLOGY | Facility: HOSPITAL | Age: 39
End: 2024-12-13
Payer: MEDICAID

## 2024-12-13 ENCOUNTER — HOSPITAL ENCOUNTER (OUTPATIENT)
Facility: HOSPITAL | Age: 39
Discharge: HOME | End: 2024-12-16
Attending: EMERGENCY MEDICINE | Admitting: STUDENT IN AN ORGANIZED HEALTH CARE EDUCATION/TRAINING PROGRAM
Payer: MEDICAID

## 2024-12-13 DIAGNOSIS — I21.4 NSTEMI (NON-ST ELEVATED MYOCARDIAL INFARCTION): Primary | ICD-10-CM

## 2024-12-13 DIAGNOSIS — R00.2 PALPITATION: ICD-10-CM

## 2024-12-13 LAB
ALBUMIN SERPL-MCNC: 4.7 G/DL (ref 3.5–5.2)
ALBUMIN/GLOB SERPL: 1.6 G/DL
ALP SERPL-CCNC: 92 U/L (ref 39–117)
ALT SERPL W P-5'-P-CCNC: 47 U/L (ref 1–41)
ANION GAP SERPL CALCULATED.3IONS-SCNC: 10.8 MMOL/L (ref 5–15)
AST SERPL-CCNC: 31 U/L (ref 1–40)
BASOPHILS # BLD AUTO: 0.06 10*3/MM3 (ref 0–0.2)
BASOPHILS NFR BLD AUTO: 0.6 % (ref 0–1.5)
BILIRUB SERPL-MCNC: 0.5 MG/DL (ref 0–1.2)
BUN SERPL-MCNC: 13 MG/DL (ref 6–20)
BUN/CREAT SERPL: 13.1 (ref 7–25)
CALCIUM SPEC-SCNC: 9.5 MG/DL (ref 8.6–10.5)
CHLORIDE SERPL-SCNC: 102 MMOL/L (ref 98–107)
CO2 SERPL-SCNC: 29.2 MMOL/L (ref 22–29)
CREAT SERPL-MCNC: 0.99 MG/DL (ref 0.76–1.27)
DEPRECATED RDW RBC AUTO: 39.9 FL (ref 37–54)
EGFRCR SERPLBLD CKD-EPI 2021: 98.1 ML/MIN/1.73
EOSINOPHIL # BLD AUTO: 0.31 10*3/MM3 (ref 0–0.4)
EOSINOPHIL NFR BLD AUTO: 3.2 % (ref 0.3–6.2)
ERYTHROCYTE [DISTWIDTH] IN BLOOD BY AUTOMATED COUNT: 12.2 % (ref 12.3–15.4)
GLOBULIN UR ELPH-MCNC: 3 GM/DL
GLUCOSE SERPL-MCNC: 93 MG/DL (ref 65–99)
HCT VFR BLD AUTO: 47.1 % (ref 37.5–51)
HGB BLD-MCNC: 15.6 G/DL (ref 13–17.7)
IMM GRANULOCYTES # BLD AUTO: 0.03 10*3/MM3 (ref 0–0.05)
IMM GRANULOCYTES NFR BLD AUTO: 0.3 % (ref 0–0.5)
LIPASE SERPL-CCNC: 44 U/L (ref 13–60)
LYMPHOCYTES # BLD AUTO: 2.14 10*3/MM3 (ref 0.7–3.1)
LYMPHOCYTES NFR BLD AUTO: 22.4 % (ref 19.6–45.3)
MCH RBC QN AUTO: 29.9 PG (ref 26.6–33)
MCHC RBC AUTO-ENTMCNC: 33.1 G/DL (ref 31.5–35.7)
MCV RBC AUTO: 90.4 FL (ref 79–97)
MONOCYTES # BLD AUTO: 0.85 10*3/MM3 (ref 0.1–0.9)
MONOCYTES NFR BLD AUTO: 8.9 % (ref 5–12)
NEUTROPHILS NFR BLD AUTO: 6.17 10*3/MM3 (ref 1.7–7)
NEUTROPHILS NFR BLD AUTO: 64.6 % (ref 42.7–76)
NRBC BLD AUTO-RTO: 0 /100 WBC (ref 0–0.2)
NT-PROBNP SERPL-MCNC: <36 PG/ML (ref 0–450)
PLATELET # BLD AUTO: 323 10*3/MM3 (ref 140–450)
PMV BLD AUTO: 8.9 FL (ref 6–12)
POTASSIUM SERPL-SCNC: 3.9 MMOL/L (ref 3.5–5.2)
PROT SERPL-MCNC: 7.7 G/DL (ref 6–8.5)
RBC # BLD AUTO: 5.21 10*6/MM3 (ref 4.14–5.8)
SODIUM SERPL-SCNC: 142 MMOL/L (ref 136–145)
TROPONIN T SERPL HS-MCNC: <6 NG/L
WBC NRBC COR # BLD AUTO: 9.56 10*3/MM3 (ref 3.4–10.8)

## 2024-12-13 PROCEDURE — 84484 ASSAY OF TROPONIN QUANT: CPT | Performed by: NURSE PRACTITIONER

## 2024-12-13 PROCEDURE — 83690 ASSAY OF LIPASE: CPT | Performed by: NURSE PRACTITIONER

## 2024-12-13 PROCEDURE — 85025 COMPLETE CBC W/AUTO DIFF WBC: CPT | Performed by: NURSE PRACTITIONER

## 2024-12-13 PROCEDURE — 93005 ELECTROCARDIOGRAM TRACING: CPT | Performed by: NURSE PRACTITIONER

## 2024-12-13 PROCEDURE — 99285 EMERGENCY DEPT VISIT HI MDM: CPT

## 2024-12-13 PROCEDURE — 71045 X-RAY EXAM CHEST 1 VIEW: CPT

## 2024-12-13 PROCEDURE — 36415 COLL VENOUS BLD VENIPUNCTURE: CPT

## 2024-12-13 PROCEDURE — 80053 COMPREHEN METABOLIC PANEL: CPT | Performed by: NURSE PRACTITIONER

## 2024-12-13 PROCEDURE — 83880 ASSAY OF NATRIURETIC PEPTIDE: CPT | Performed by: NURSE PRACTITIONER

## 2024-12-13 PROCEDURE — 85610 PROTHROMBIN TIME: CPT | Performed by: EMERGENCY MEDICINE

## 2024-12-13 PROCEDURE — 85730 THROMBOPLASTIN TIME PARTIAL: CPT | Performed by: EMERGENCY MEDICINE

## 2024-12-13 PROCEDURE — 85379 FIBRIN DEGRADATION QUANT: CPT | Performed by: EMERGENCY MEDICINE

## 2024-12-13 RX ORDER — SODIUM CHLORIDE 0.9 % (FLUSH) 0.9 %
10 SYRINGE (ML) INJECTION AS NEEDED
Status: DISCONTINUED | OUTPATIENT
Start: 2024-12-13 | End: 2024-12-16 | Stop reason: HOSPADM

## 2024-12-13 NOTE — Clinical Note
Hemostasis started on the right radial artery. R-Band was used in achieving hemostasis. Radial compression device applied to vessel. Hemostasis achieved successfully. Closure device additional comment: 12cc of air

## 2024-12-13 NOTE — Clinical Note
Level of Care: Telemetry [5]   Admitting Physician: MARC ROSAS [[id number]]   Bed Request Comments: PCU

## 2024-12-14 ENCOUNTER — APPOINTMENT (OUTPATIENT)
Dept: CARDIOLOGY | Facility: HOSPITAL | Age: 39
End: 2024-12-14
Payer: MEDICAID

## 2024-12-14 LAB
AORTIC DIMENSIONLESS INDEX: 1 (DI)
APTT PPP: 35.2 SECONDS (ref 22.7–35.4)
APTT PPP: 35.3 SECONDS (ref 22.7–35.4)
APTT PPP: 38.1 SECONDS (ref 22.7–35.4)
APTT PPP: >200 SECONDS (ref 22.7–35.4)
BASOPHILS # BLD AUTO: 0.07 10*3/MM3 (ref 0–0.2)
BASOPHILS NFR BLD AUTO: 0.6 % (ref 0–1.5)
BH CV ECHO LEFT VENTRICLE GLOBAL LONGITUDINAL STRAIN: -17.7 %
BH CV ECHO MEAS - AO MAX PG: 4.7 MMHG
BH CV ECHO MEAS - AO MEAN PG: 3 MMHG
BH CV ECHO MEAS - AO V2 MAX: 108 CM/SEC
BH CV ECHO MEAS - AO V2 VTI: 20.8 CM
BH CV ECHO MEAS - AVA(I,D): 2.9 CM2
BH CV ECHO MEAS - EDV(CUBED): 68.9 ML
BH CV ECHO MEAS - EDV(MOD-SP2): 57.3 ML
BH CV ECHO MEAS - EDV(MOD-SP4): 68.1 ML
BH CV ECHO MEAS - EF(MOD-BP): 55.5 %
BH CV ECHO MEAS - EF(MOD-SP2): 57.4 %
BH CV ECHO MEAS - EF(MOD-SP4): 58 %
BH CV ECHO MEAS - ESV(CUBED): 22 ML
BH CV ECHO MEAS - ESV(MOD-SP2): 24.4 ML
BH CV ECHO MEAS - ESV(MOD-SP4): 28.6 ML
BH CV ECHO MEAS - FS: 31.7 %
BH CV ECHO MEAS - IVS/LVPW: 0.88 CM
BH CV ECHO MEAS - IVSD: 0.7 CM
BH CV ECHO MEAS - LA DIMENSION: 3.1 CM
BH CV ECHO MEAS - LAT PEAK E' VEL: 12.4 CM/SEC
BH CV ECHO MEAS - LV DIASTOLIC VOL/BSA (35-75): 29.7 CM2
BH CV ECHO MEAS - LV MASS(C)D: 89.4 GRAMS
BH CV ECHO MEAS - LV MAX PG: 4.7 MMHG
BH CV ECHO MEAS - LV MEAN PG: 2 MMHG
BH CV ECHO MEAS - LV SYSTOLIC VOL/BSA (12-30): 12.5 CM2
BH CV ECHO MEAS - LV V1 MAX: 108 CM/SEC
BH CV ECHO MEAS - LV V1 VTI: 17.2 CM
BH CV ECHO MEAS - LVIDD: 4.1 CM
BH CV ECHO MEAS - LVIDS: 2.8 CM
BH CV ECHO MEAS - LVOT AREA: 3.5 CM2
BH CV ECHO MEAS - LVOT DIAM: 2.1 CM
BH CV ECHO MEAS - LVPWD: 0.8 CM
BH CV ECHO MEAS - MED PEAK E' VEL: 11.1 CM/SEC
BH CV ECHO MEAS - MV A DUR: 0.14 SEC
BH CV ECHO MEAS - MV A MAX VEL: 52.9 CM/SEC
BH CV ECHO MEAS - MV DEC SLOPE: 309 CM/SEC2
BH CV ECHO MEAS - MV DEC TIME: 0.18 SEC
BH CV ECHO MEAS - MV E MAX VEL: 74.4 CM/SEC
BH CV ECHO MEAS - MV E/A: 1.41
BH CV ECHO MEAS - MV MAX PG: 3.4 MMHG
BH CV ECHO MEAS - MV MEAN PG: 2 MMHG
BH CV ECHO MEAS - MV P1/2T: 92.4 MSEC
BH CV ECHO MEAS - MV V2 VTI: 28.5 CM
BH CV ECHO MEAS - MVA(P1/2T): 2.38 CM2
BH CV ECHO MEAS - MVA(VTI): 2.09 CM2
BH CV ECHO MEAS - PA ACC TIME: 0.09 SEC
BH CV ECHO MEAS - PA V2 MAX: 103 CM/SEC
BH CV ECHO MEAS - PULM A REVS DUR: 0.12 SEC
BH CV ECHO MEAS - PULM A REVS VEL: 64.8 CM/SEC
BH CV ECHO MEAS - PULM DIAS VEL: 59.3 CM/SEC
BH CV ECHO MEAS - PULM S/D: 0.86
BH CV ECHO MEAS - PULM SYS VEL: 51.1 CM/SEC
BH CV ECHO MEAS - RAP SYSTOLE: 3 MMHG
BH CV ECHO MEAS - RV MAX PG: 1.55 MMHG
BH CV ECHO MEAS - RV V1 MAX: 62.3 CM/SEC
BH CV ECHO MEAS - RV V1 VTI: 12.6 CM
BH CV ECHO MEAS - RVSP: 20.3 MMHG
BH CV ECHO MEAS - SV(LVOT): 59.6 ML
BH CV ECHO MEAS - SV(MOD-SP2): 32.9 ML
BH CV ECHO MEAS - SV(MOD-SP4): 39.5 ML
BH CV ECHO MEAS - SVI(LVOT): 26 ML/M2
BH CV ECHO MEAS - SVI(MOD-SP2): 14.4 ML/M2
BH CV ECHO MEAS - SVI(MOD-SP4): 17.3 ML/M2
BH CV ECHO MEAS - TAPSE (>1.6): 2.01 CM
BH CV ECHO MEAS - TR MAX PG: 17.3 MMHG
BH CV ECHO MEAS - TR MAX VEL: 208 CM/SEC
BH CV ECHO MEASUREMENTS AVERAGE E/E' RATIO: 6.33
BH CV XLRA - TDI S': 15.7 CM/SEC
CHOLEST SERPL-MCNC: 173 MG/DL (ref 0–200)
D DIMER PPP FEU-MCNC: <0.27 MCGFEU/ML (ref 0–0.5)
DEPRECATED RDW RBC AUTO: 40.3 FL (ref 37–54)
EOSINOPHIL # BLD AUTO: 0.4 10*3/MM3 (ref 0–0.4)
EOSINOPHIL NFR BLD AUTO: 3.3 % (ref 0.3–6.2)
ERYTHROCYTE [DISTWIDTH] IN BLOOD BY AUTOMATED COUNT: 12.3 % (ref 12.3–15.4)
GEN 5 1HR TROPONIN T REFLEX: 83 NG/L
GLUCOSE BLDC GLUCOMTR-MCNC: 109 MG/DL (ref 70–105)
GLUCOSE BLDC GLUCOMTR-MCNC: 85 MG/DL (ref 70–105)
GLUCOSE BLDC GLUCOMTR-MCNC: 89 MG/DL (ref 70–105)
GLUCOSE BLDC GLUCOMTR-MCNC: 91 MG/DL (ref 70–105)
HCT VFR BLD AUTO: 44.9 % (ref 37.5–51)
HDLC SERPL-MCNC: 45 MG/DL (ref 40–60)
HGB BLD-MCNC: 14.8 G/DL (ref 13–17.7)
IMM GRANULOCYTES # BLD AUTO: 0.03 10*3/MM3 (ref 0–0.05)
IMM GRANULOCYTES NFR BLD AUTO: 0.2 % (ref 0–0.5)
INR PPP: 0.95 (ref 0.9–1.1)
LDLC SERPL CALC-MCNC: 118 MG/DL (ref 0–100)
LDLC/HDLC SERPL: 2.62 {RATIO}
LEFT ATRIUM VOLUME INDEX: 13 ML/M2
LYMPHOCYTES # BLD AUTO: 3.38 10*3/MM3 (ref 0.7–3.1)
LYMPHOCYTES NFR BLD AUTO: 27.6 % (ref 19.6–45.3)
MCH RBC QN AUTO: 29.7 PG (ref 26.6–33)
MCHC RBC AUTO-ENTMCNC: 33 G/DL (ref 31.5–35.7)
MCV RBC AUTO: 90 FL (ref 79–97)
MONOCYTES # BLD AUTO: 1.12 10*3/MM3 (ref 0.1–0.9)
MONOCYTES NFR BLD AUTO: 9.2 % (ref 5–12)
NEUTROPHILS NFR BLD AUTO: 59.1 % (ref 42.7–76)
NEUTROPHILS NFR BLD AUTO: 7.24 10*3/MM3 (ref 1.7–7)
NRBC BLD AUTO-RTO: 0 /100 WBC (ref 0–0.2)
PLATELET # BLD AUTO: 324 10*3/MM3 (ref 140–450)
PMV BLD AUTO: 9.1 FL (ref 6–12)
PROTHROMBIN TIME: 12.7 SECONDS (ref 11.7–14.2)
QT INTERVAL: 373 MS
QT INTERVAL: 376 MS
QTC INTERVAL: 428 MS
QTC INTERVAL: 452 MS
RBC # BLD AUTO: 4.99 10*6/MM3 (ref 4.14–5.8)
SINUS: 3 CM
STJ: 2.2 CM
TRIGL SERPL-MCNC: 50 MG/DL (ref 0–150)
TROPONIN T NUMERIC DELTA: ABNORMAL
TROPONIN T SERPL HS-MCNC: 9 NG/L
VLDLC SERPL-MCNC: 10 MG/DL (ref 5–40)
WBC NRBC COR # BLD AUTO: 12.24 10*3/MM3 (ref 3.4–10.8)

## 2024-12-14 PROCEDURE — 84484 ASSAY OF TROPONIN QUANT: CPT | Performed by: INTERNAL MEDICINE

## 2024-12-14 PROCEDURE — 85730 THROMBOPLASTIN TIME PARTIAL: CPT | Performed by: INTERNAL MEDICINE

## 2024-12-14 PROCEDURE — G0378 HOSPITAL OBSERVATION PER HR: HCPCS

## 2024-12-14 PROCEDURE — 85730 THROMBOPLASTIN TIME PARTIAL: CPT | Performed by: STUDENT IN AN ORGANIZED HEALTH CARE EDUCATION/TRAINING PROGRAM

## 2024-12-14 PROCEDURE — 82948 REAGENT STRIP/BLOOD GLUCOSE: CPT

## 2024-12-14 PROCEDURE — 93306 TTE W/DOPPLER COMPLETE: CPT

## 2024-12-14 PROCEDURE — 93005 ELECTROCARDIOGRAM TRACING: CPT | Performed by: EMERGENCY MEDICINE

## 2024-12-14 PROCEDURE — 82948 REAGENT STRIP/BLOOD GLUCOSE: CPT | Performed by: STUDENT IN AN ORGANIZED HEALTH CARE EDUCATION/TRAINING PROGRAM

## 2024-12-14 PROCEDURE — 25010000002 HEPARIN (PORCINE) 25000-0.45 UT/250ML-% SOLUTION: Performed by: INTERNAL MEDICINE

## 2024-12-14 PROCEDURE — 80061 LIPID PANEL: CPT | Performed by: INTERNAL MEDICINE

## 2024-12-14 PROCEDURE — 85025 COMPLETE CBC W/AUTO DIFF WBC: CPT | Performed by: EMERGENCY MEDICINE

## 2024-12-14 PROCEDURE — 25010000002 HEPARIN (PORCINE) 25000-0.45 UT/250ML-% SOLUTION: Performed by: EMERGENCY MEDICINE

## 2024-12-14 PROCEDURE — 96365 THER/PROPH/DIAG IV INF INIT: CPT

## 2024-12-14 PROCEDURE — 96366 THER/PROPH/DIAG IV INF ADDON: CPT

## 2024-12-14 PROCEDURE — 93356 MYOCRD STRAIN IMG SPCKL TRCK: CPT | Performed by: INTERNAL MEDICINE

## 2024-12-14 PROCEDURE — 93356 MYOCRD STRAIN IMG SPCKL TRCK: CPT

## 2024-12-14 PROCEDURE — 93306 TTE W/DOPPLER COMPLETE: CPT | Performed by: INTERNAL MEDICINE

## 2024-12-14 RX ORDER — NITROGLYCERIN 0.4 MG/1
0.4 TABLET SUBLINGUAL
Status: DISCONTINUED | OUTPATIENT
Start: 2024-12-14 | End: 2024-12-15 | Stop reason: SDUPTHER

## 2024-12-14 RX ORDER — SODIUM CHLORIDE 0.9 % (FLUSH) 0.9 %
10 SYRINGE (ML) INJECTION AS NEEDED
Status: DISCONTINUED | OUTPATIENT
Start: 2024-12-14 | End: 2024-12-16 | Stop reason: HOSPADM

## 2024-12-14 RX ORDER — SODIUM CHLORIDE 0.9 % (FLUSH) 0.9 %
10 SYRINGE (ML) INJECTION EVERY 12 HOURS SCHEDULED
Status: DISCONTINUED | OUTPATIENT
Start: 2024-12-14 | End: 2024-12-16 | Stop reason: HOSPADM

## 2024-12-14 RX ORDER — ONDANSETRON 2 MG/ML
4 INJECTION INTRAMUSCULAR; INTRAVENOUS EVERY 6 HOURS PRN
Status: DISCONTINUED | OUTPATIENT
Start: 2024-12-14 | End: 2024-12-15 | Stop reason: SDUPTHER

## 2024-12-14 RX ORDER — DICYCLOMINE HYDROCHLORIDE 10 MG/1
20 CAPSULE ORAL 3 TIMES DAILY
Status: DISCONTINUED | OUTPATIENT
Start: 2024-12-14 | End: 2024-12-16 | Stop reason: HOSPADM

## 2024-12-14 RX ORDER — ATENOLOL 25 MG/1
25 TABLET ORAL NIGHTLY
Status: DISCONTINUED | OUTPATIENT
Start: 2024-12-14 | End: 2024-12-16 | Stop reason: HOSPADM

## 2024-12-14 RX ORDER — BISACODYL 5 MG/1
5 TABLET, DELAYED RELEASE ORAL DAILY PRN
Status: DISCONTINUED | OUTPATIENT
Start: 2024-12-14 | End: 2024-12-16 | Stop reason: HOSPADM

## 2024-12-14 RX ORDER — POLYETHYLENE GLYCOL 3350 17 G/17G
17 POWDER, FOR SOLUTION ORAL DAILY PRN
Status: DISCONTINUED | OUTPATIENT
Start: 2024-12-14 | End: 2024-12-16 | Stop reason: HOSPADM

## 2024-12-14 RX ORDER — ACETAMINOPHEN 325 MG/1
650 TABLET ORAL EVERY 4 HOURS PRN
Status: DISCONTINUED | OUTPATIENT
Start: 2024-12-14 | End: 2024-12-16 | Stop reason: HOSPADM

## 2024-12-14 RX ORDER — ATORVASTATIN CALCIUM 80 MG/1
80 TABLET, FILM COATED ORAL NIGHTLY
Qty: 90 TABLET | Refills: 0 | Status: SHIPPED | OUTPATIENT
Start: 2024-12-14

## 2024-12-14 RX ORDER — ASPIRIN 81 MG/1
324 TABLET, CHEWABLE ORAL ONCE
Status: COMPLETED | OUTPATIENT
Start: 2024-12-14 | End: 2024-12-14

## 2024-12-14 RX ORDER — AMOXICILLIN 250 MG
2 CAPSULE ORAL 2 TIMES DAILY PRN
Status: DISCONTINUED | OUTPATIENT
Start: 2024-12-14 | End: 2024-12-16 | Stop reason: HOSPADM

## 2024-12-14 RX ORDER — ACETAMINOPHEN 160 MG/5ML
650 SOLUTION ORAL EVERY 4 HOURS PRN
Status: DISCONTINUED | OUTPATIENT
Start: 2024-12-14 | End: 2024-12-16 | Stop reason: HOSPADM

## 2024-12-14 RX ORDER — ATORVASTATIN CALCIUM 40 MG/1
80 TABLET, FILM COATED ORAL NIGHTLY
Status: DISCONTINUED | OUTPATIENT
Start: 2024-12-14 | End: 2024-12-16 | Stop reason: HOSPADM

## 2024-12-14 RX ORDER — ACETAMINOPHEN 650 MG/1
650 SUPPOSITORY RECTAL EVERY 4 HOURS PRN
Status: DISCONTINUED | OUTPATIENT
Start: 2024-12-14 | End: 2024-12-16 | Stop reason: HOSPADM

## 2024-12-14 RX ORDER — ONDANSETRON 4 MG/1
4 TABLET, ORALLY DISINTEGRATING ORAL EVERY 6 HOURS PRN
Status: DISCONTINUED | OUTPATIENT
Start: 2024-12-14 | End: 2024-12-15 | Stop reason: SDUPTHER

## 2024-12-14 RX ORDER — ACETAMINOPHEN 500 MG
500 TABLET ORAL EVERY 6 HOURS PRN
COMMUNITY
End: 2024-12-20 | Stop reason: SDUPTHER

## 2024-12-14 RX ORDER — LISINOPRIL 20 MG/1
20 TABLET ORAL DAILY
Status: DISCONTINUED | OUTPATIENT
Start: 2024-12-14 | End: 2024-12-16 | Stop reason: HOSPADM

## 2024-12-14 RX ORDER — HEPARIN SODIUM 10000 [USP'U]/100ML
9.9 INJECTION, SOLUTION INTRAVENOUS
Status: DISCONTINUED | OUTPATIENT
Start: 2024-12-14 | End: 2024-12-15

## 2024-12-14 RX ORDER — NORTRIPTYLINE HYDROCHLORIDE 25 MG/1
100 CAPSULE ORAL NIGHTLY
Status: DISCONTINUED | OUTPATIENT
Start: 2024-12-14 | End: 2024-12-16 | Stop reason: HOSPADM

## 2024-12-14 RX ORDER — BISACODYL 10 MG
10 SUPPOSITORY, RECTAL RECTAL DAILY PRN
Status: DISCONTINUED | OUTPATIENT
Start: 2024-12-14 | End: 2024-12-16 | Stop reason: HOSPADM

## 2024-12-14 RX ORDER — SODIUM CHLORIDE 9 MG/ML
40 INJECTION, SOLUTION INTRAVENOUS AS NEEDED
Status: DISCONTINUED | OUTPATIENT
Start: 2024-12-14 | End: 2024-12-16 | Stop reason: HOSPADM

## 2024-12-14 RX ADMIN — NITROGLYCERIN 1 INCH: 20 OINTMENT TOPICAL at 18:23

## 2024-12-14 RX ADMIN — DICYCLOMINE HYDROCHLORIDE 20 MG: 10 CAPSULE ORAL at 22:38

## 2024-12-14 RX ADMIN — HEPARIN SODIUM 9.9 UNITS/KG/HR: 10000 INJECTION, SOLUTION INTRAVENOUS at 01:35

## 2024-12-14 RX ADMIN — ASPIRIN 81 MG CHEWABLE TABLET 324 MG: 81 TABLET CHEWABLE at 01:01

## 2024-12-14 RX ADMIN — Medication 10 ML: at 03:08

## 2024-12-14 RX ADMIN — NORTRIPTYLINE HYDROCHLORIDE 100 MG: 25 CAPSULE ORAL at 22:51

## 2024-12-14 RX ADMIN — DICYCLOMINE HYDROCHLORIDE 20 MG: 10 CAPSULE ORAL at 17:32

## 2024-12-14 RX ADMIN — ATENOLOL 25 MG: 25 TABLET ORAL at 22:38

## 2024-12-14 RX ADMIN — HEPARIN SODIUM 12.9 UNITS/KG/HR: 10000 INJECTION, SOLUTION INTRAVENOUS at 22:40

## 2024-12-14 RX ADMIN — Medication 10 ML: at 09:45

## 2024-12-14 RX ADMIN — ATORVASTATIN CALCIUM 80 MG: 40 TABLET, FILM COATED ORAL at 03:08

## 2024-12-14 RX ADMIN — ATORVASTATIN CALCIUM 80 MG: 40 TABLET, FILM COATED ORAL at 22:38

## 2024-12-14 RX ADMIN — Medication 10 ML: at 22:52

## 2024-12-14 NOTE — CONSULTS
[facility] CARDIOLOGY CONSULT  [facility]        Subjective:     Encounter Date:12/13/2024      Patient ID: Davian Arevalo is a 39 y.o. male.    Chief Complaint: Chest Pain      HPI:  Davian Arevalo is a 39 y.o. male known to Dr. Mendoza without established ischemic heart disease.  PMH includes HTN, cervical spine dysfunction, and IBS.  Treadmill stress test in 2018 was normal.  Patient presents to the ER with complaints of chest pain and second troponin was elevated.  He was started on heparin gtt and cardiology consulted for further evaluation and management.    Patient tells me he was sitting yesterday and developed a sudden sharp pain in his right chest radiating to his right jaw unaccompanied by other symptoms.  This occurs intermittently but he is unable to identify precipitators.  He does admit to doing extensive cleaning 2 days prior to his chest discomfort.  He reports some exertional shortness of breath over the past few months.  Currently he has right mid sternal chest pain radiating to the bilateral jaw rated a 3-4 out of 10.  There is no tenderness to palpation of the chest wall.      Past Medical History:   Diagnosis Date    Abdominal pain, chronic, right lower quadrant 01/30/2015    Anxiety     Autism 08/13/2014    mild ( Abstracted from [employer].)    Carpal tunnel syndrome 02/22/2018    Constipation 09/21/2018    Depression     Dyslexia 12/26/2017    Hypertension     IBS (irritable bowel syndrome) 09/21/2018    IBS (irritable bowel syndrome)     Knee pain, left 07/25/2017    Lower back pain 01/30/2015    Right cervical radiculopathy 01/07/2019    Shoulder pain, right 02/14/2017    Tinnitus     Vitamin D deficiency 12/26/2017         Past Surgical History:   Procedure Laterality Date    ADENOIDECTOMY      COLONOSCOPY      CYSTOSCOPY, URETEROSCOPY, RETROGRADE PYELOGRAM, STENT INSERTION Right 12/15/2020    Procedure: CYSTOSCOPY URETEROSCOPY STONE BASKET  Please see phone call. Has ? About insulin pump because she is having surgery.    "EXTRACTION AND STENT INSERTION;  Surgeon: Gerry Giron MD;  Location: [facility] MA[facility] OR;  Service: Urology;  Laterality: Right;    TONSILLECTOMY  1993    Tonsillectomy and Adenoids-    TONSILLECTOMY AND ADENOIDECTOMY           Social History     Socioeconomic History    Marital status: Single   Tobacco Use    Smoking status: Never     Passive exposure: Never    Smokeless tobacco: Never   Vaping Use    Vaping status: Never Used   Substance and Sexual Activity    Alcohol use: Never    Drug use: No    Sexual activity: Defer       Family History   Problem Relation Age of Onset    Cancer Mother         breast    Hypertension Mother     Atrial fibrillation Mother     Cancer Father     Hypertension Father     Stroke Father     Hyperlipidemia Father     Diabetes Paternal Uncle     Diabetes Maternal Grandmother     Other Maternal Grandfather         Parkinson's disease    Stroke Maternal Grandfather     Diabetes Maternal Grandfather     Stroke Other    Father suffered a stroke in his 60s.      Allergies   Allergen Reactions    Ceclor [Cefaclor] Hives    Amoxicillin Hives    Cephalexin Hives     Tolerated CTX 12/2020 admission    Cloral Hives       Current Medications:   Scheduled Meds:atenolol, 25 mg, Oral, Nightly  atorvastatin, 80 mg, Oral, Nightly  dicyclomine, 20 mg, Oral, TID  lisinopril, 20 mg, Oral, Daily  nortriptyline, 100 mg, Oral, Nightly  sodium chloride, 10 mL, Intravenous, Q12H      Continuous Infusions:heparin, 9.9 Units/kg/hr, Last Rate: 9.9 Units/kg/hr (12/14/24 1636)        ROS  All other systems reviewed and are negative.       Objective:         /87 (BP Location: Right arm, Patient Position: Sitting)   Pulse 81   Temp 97.6 °F (36.4 °C) (Infrared)   Resp 14   Ht 182.9 cm (72\")   Wt 108 kg (237 lb 14.4 oz)   SpO2 95%   BMI 32.27 kg/m²       General: Well-developed in NAD.  Neuro: AAOx3. No gross deficits.  HEENT: Sclerae clear, no xanthelasmas.  CV: S1S2, RRR. No murmurs or gallops. No " JVD.  Resp: Breathing is unlabored. Lungs CTA throughout.  GI: BS+. Abdomen soft and NTTP.  Ext: Pedal pulses are palpable. Extremities are nonedematous.  MS: moves all extremities, no weakness.  Skin: warm, dry.  Psych: calm and cooperative.            Lab Review:     Results from last 7 days   Lab Units 12/13/24 2125   SODIUM mmol/L 142   POTASSIUM mmol/L 3.9   CHLORIDE mmol/L 102   CO2 mmol/L 29.2*   BUN mg/dL 13   CREATININE mg/dL 0.99   GLUCOSE mg/dL 93   CALCIUM mg/dL 9.5   AST (SGOT) U/L 31   ALT (SGPT) U/L 47*     Results from last 7 days   Lab Units 12/14/24  0315 12/13/24  2359 12/13/24 2125   HSTROP T ng/L 9 83* <6     Results from last 7 days   Lab Units 12/14/24 0315 12/13/24 2125   WBC 10*3/mm3 12.24* 9.56   HEMOGLOBIN g/dL 14.8 15.6   HEMATOCRIT % 44.9 47.1   PLATELETS 10*3/mm3 324 323     Results from last 7 days   Lab Units 12/14/24  1154 12/14/24 0315 12/13/24 2359   INR   --   --  0.95   APTT seconds 35.2 >200.0* 35.3         Results from last 7 days   Lab Units 12/14/24 0315   CHOLESTEROL mg/dL 173   TRIGLYCERIDES mg/dL 50   HDL CHOL mg/dL 45     Results from last 7 days   Lab Units 12/13/24 2125   PROBNP pg/mL <36.0           Recent Radiology:  Imaging Results (Most Recent)       Procedure Component Value Units Date/Time    XR Chest 1 View [[id number]] Collected: 12/13/24 2137     Updated: 12/13/24 2139    Narrative:      XR CHEST 1 VW    Date of Exam: 12/13/2024 9:31 PM EST    Indication: chest pain    Comparison: 7/18/2023    Findings:  Cardiomediastinal silhouette is unremarkable.  No airspace disease, pneumothorax, nor pleural effusion. No acute osseous abnormality identified.      Impression:      Impression:  No acute process identified      Electronically Signed: Mal Chow MD    12/13/2024 9:37 PM EST    Workstation ID: [id number]              ECHOCARDIOGRAM:    Results for orders placed during the hospital encounter of 12/13/24    Adult Transthoracic Echo Complete W/ Cont if  Necessary Per Protocol    Interpretation Summary    Left ventricular systolic function is normal. Left ventricular ejection fraction appears to be 66 - 70%.    Left ventricular diastolic function was normal.    Estimated right ventricular systolic pressure from tricuspid regurgitation is normal (<35 mmHg). Calculated right ventricular systolic pressure from tricuspid regurgitation is 20 mmHg.            Assessment:         Active Hospital Problems    Diagnosis  POA    **Chest pain [R07.9]  Yes     1) atypical chest pain / elevated troponin  -High-sensitivity troponin 6, 83, 9  -EKG shows no acute findings  - D-dimer is negative  -CXR is negative for acute findings  - on heparin gtt    2) HTN    3) Hx cervical spine dysfunction           Plan:   2D echo shows a preserved EF without wall motion abnormalities or any significant valvular heart disease.  Patient continues to have persistent chest pain on heparin drip.  Will give trial of Nitropaste.  Further recommendations on ischemic eval per attending cardiologist.           Electronically signed by YONAS Wheat, 12/14/24, 5:48 PM EST.     Cardiology attending:  Seen and examined.  Chart and labs reviewed. Independent interpretations of cardiac testing was performed. History and exam findings are verified with above changes noted.  Assessment and plan notated by APC after being formulated by attending consultant.  Note that greater than 50% of the time spent in care of the patient was provided by attending consultant.    Patient reports chest pain with radiation to his jaw.  He reports that he has had chest pain in the past but it has been very fleeting.  He reports this discomfort stuck around for quite some time.  His cardiac biomarkers did elevate.  He is on heparin intravenously and Nitropaste.  Risks benefits and options were discussed with the patient.  He wishes to proceed with cardiac catheterization.  Will plan for tomorrow.  N.p.o. after  midnight.  Will plan for radial approach.    Physical Exam:    General: Alert, cooperative, no distress, appears stated age  Head:  Normocephalic, atraumatic, mucous membranes moist  Eyes:  Conjunctivae/corneas clear, EOM's intact     Neck:  Supple,  no bruit  Lungs:            Clear to auscultation bilaterally, no wheezes rhonchi rales are noted  Chest wall: No tenderness  Heart::  Regular rate and rhythm, S1 and S2 normal, 1/6 holosystolic murmur.  No rub or gallop  Abdomen: Soft, nontender, nondistended bowel sounds active  Extremities: No cyanosis, clubbing, or edema  Pulses:            2+ and symmetric all extremities  Skin:  No rashes or lesions  Neuro/psych: A&O x3. CN II through XII are grossly intact with flat affect

## 2024-12-14 NOTE — H&P
Hospitalist Service  History and Physical      Patient Name: Davian Arevalo  : 1985  MRN: [id number]  Primary Care Physician:  Merlene Zamorano MD  Date of admission: 2024      Subjective      Chief Complaint: Chest pain    History of Present Illness: Davian Arevalo is a 39 y.o. male with past medical history of hypertension and anxiety who presented to [facility] on 2024 with complaints of chest pain.  Patient reports right-sided chest pain that started 8:30 PM last night.  He reports associated dyspnea but denies any dizziness, diaphoresis, or palpitations.  Pain radiates to the neck area.  Initial EKG with NSR and no acute ST-T wave changes.  However patient was noted to have uptrending troponin in the ER.  Started on heparin drip.  Records show previously had stress test in 2018 which was negative.  Patient reports seeing Dr. Bansal in April of this year for palpitations, patient be on atenolol.  Denies any other cardiac workup recently.      ROS: Pertinent positives as noted in HPI/subjective.  All other systems were reviewed and are negative.      Personal History     Past Medical History:   Diagnosis Date    Abdominal pain, chronic, right lower quadrant 2015    Anxiety     Autism 2014    mild ( Abstracted from [employer]    Carpal tunnel syndrome 2018    Constipation 2018    Depression     Dyslexia 2017    Hypertension     IBS (irritable bowel syndrome) 2018    IBS (irritable bowel syndrome)     Knee pain, left 2017    Lower back pain 2015    Right cervical radiculopathy 2019    Shoulder pain, right 2017    Tinnitus     Vitamin D deficiency 2017       Past Surgical History:   Procedure Laterality Date    ADENOIDECTOMY      COLONOSCOPY      CYSTOSCOPY, URETEROSCOPY, RETROGRADE PYELOGRAM, STENT INSERTION Right 12/15/2020    Procedure: CYSTOSCOPY URETEROSCOPY STONE BASKET EXTRACTION AND STENT INSERTION;   Surgeon: Gerry Giron MD;  Location: [facility] MA[facility] OR;  Service: Urology;  Laterality: Right;    TONSILLECTOMY  1993    Tonsillectomy and Adenoids-    TONSILLECTOMY AND ADENOIDECTOMY         Family History: family history includes Atrial fibrillation in his mother; Cancer in his father and mother; Diabetes in his maternal grandfather, maternal grandmother, and paternal uncle; Hyperlipidemia in his father; Hypertension in his father and mother; Other in his maternal grandfather; Stroke in his father, maternal grandfather, and another family member. Otherwise pertinent FHx was reviewed and not pertinent to current issue.    Social History:  reports that he has never smoked. He has never been exposed to tobacco smoke. He has never used smokeless tobacco. He reports that he does not drink alcohol and does not use drugs.    Home Medications:  Prior to Admission Medications       Prescriptions Last Dose Informant Patient Reported? Taking?    atenolol (TENORMIN) 25 MG tablet   No No    TAKE ONE TABLET BY MOUTH ONCE NIGHTLY    cholecalciferol (VITAMIN D3) 1000 units tablet   Yes No    Take 1 tablet by mouth Daily.    dicyclomine (BENTYL) 20 MG tablet   Yes No    Take 1 tablet by mouth 3 (Three) Times a Day.    Emollient (CeraVe Moisturizing) cream   No No    Apply 1 Application topically 2 (Two) Times a Day.    Patient not taking:  Reported on 11/5/2024    folic acid-vit B6-vit B12 (FOLBEE) 2.5-25-1 MG tablet tablet   Yes No    Take  by mouth Daily.    hydrocortisone 2.5 % cream   No No    Apply 1 application topically to the appropriate area as directed 2 (Two) Times a Day.    Patient not taking:  Reported on 11/5/2024    lisinopril (PRINIVIL,ZESTRIL) 20 MG tablet   No No    TAKE 1 TABLET BY MOUTH DAILY    meloxicam (Mobic) 15 MG tablet   No No    Take 1 tablet by mouth Daily.    mometasone (ELOCON) 0.1 % ointment   Yes No    Apply 1 Application topically to the appropriate area as directed Daily.    Patient not taking:   Reported on 11/5/2024    mupirocin (BACTROBAN) 2 % cream   No No    Apply 1 Application topically to the appropriate area as directed 2 (Two) Times a Day.    Nitro-Bid 2 % ointment   Yes No    Patient not taking:  Reported on 11/5/2024    nortriptyline (PAMELOR) 50 MG capsule   Yes No    Take 2 capsules by mouth Every Night.    ondansetron ODT (ZOFRAN-ODT) 4 MG disintegrating tablet   No No    Place 1 tablet on the tongue Every 8 (Eight) Hours As Needed for Nausea.    vitamin B-12 (CYANOCOBALAMIN) 1000 MCG tablet   Yes No    Take 1 tablet by mouth Daily.              I have utilized all available, immediate resources to obtain, update, or review the patient's current medications including all prescriptions, over-the-counter products, herbals, cannabis/cannabidiol products, and vitamin.mineral/dietary (nutritional) supplements.    Allergies:  Allergies   Allergen Reactions    Ceclor [Cefaclor] Hives    Amoxicillin Hives    Cephalexin Hives     Tolerated CTX 12/2020 admission    Cloral Hives       Objective      Vitals:   Temp:  [97.5 °F (36.4 °C)] 97.5 °F (36.4 °C)  Heart Rate:  [77-97] 85  Resp:  [18] 18  BP: (123-149)/(85-98) 128/87    Physical Exam:    General: Awake, alert, NAD  HEENT: NC/AT, PERRL, EOMI, conjunctivae are clear, mucous membrane moist, oropharynx clear, Trachea midline   Cardiovascular: Regular rate and rhythm, no murmurs  Respiratory: Clear to auscultation bilaterally, no wheezing or rales, unlabored breathing  Abdomen: Soft, nontender, positive bowel sounds, no guarding  Neurologic: A&O, CN grossly intact, moves all extremities spontaneously  Musculoskeletal: Normal range of motion, no other gross deformities  Skin: Warm, dry      Result Review    Result Review:  I have personally reviewed the results from the time of this admission to 12/14/2024 01:29 EST and agree with these findings:  [x]  Laboratory  []  Microbiology  [x]  Radiology  [x]  EKG/Telemetry   [x]  Cardiology/Vascular   []   Pathology  [x]  Old records  []  Other:      Assessment & Plan        Active Hospital Problems:  Active Hospital Problems    Diagnosis     **Chest pain        Assessment/Plan:     NSTEMI  -Patient presented with atypical chest pain  -Initial EKG without any acute ST-T wave changes, repeat has been ordered in the ER  -Troponin uptrending in setting of active chest pain  -Does have risk factor of with history of hypertension  -Started on heparin drip in the ER, continue for now  -Already received aspirin, add statin  -Echo ordered  -Cardiology consult    Hypertension  Anxiety  -Resume home meds once reconciled  -Monitor    VTE Prophylaxis:  Pharmacologic & mechanical VTE prophylaxis orders are present.        CODE STATUS:    Code Status (Patient has no pulse and is not breathing): CPR (Attempt to Resuscitate)  Medical Interventions (Patient has pulse or is breathing): Full Support      Admission Status:  I believe this patient meets obs status.    Signature:   Electronically signed by Travon Robledo DO, 12/14/24, 1:29 AM EST.    Part of this note may be an electronic transcription/translation of spoken language to printed text using the Dragon Dictation System.

## 2024-12-14 NOTE — PLAN OF CARE
Goal Outcome Evaluation:  Plan of Care Reviewed With: patient        Progress: no change  Outcome Evaluation: A&Ox4. Remains on room air. Remains in NSR. No c/o pain or discomfort. Heparin gtt infusing. Remains NPO. Consult for Dr. Bansal called in. Up ad aren. Able to make needs known. Call light within reach. VSS. Plan of care ongoing at this time.        Problem: Adult Inpatient Plan of Care  Goal: Plan of Care Review  Outcome: Progressing  Flowsheets (Taken 12/14/2024 0800)  Progress: no change  Outcome Evaluation: A&Ox4. Remains on room air. Remains in NSR. No c/o pain or discomfort. Heparin gtt infusing. Remains NPO. Consult for Dr. Bansal called in. Up ad aren. Able to make needs known. Call light within reach. VSS. Plan of care ongoing at this time.  Plan of Care Reviewed With: patient  Goal: Patient-Specific Goal (Individualized)  Outcome: Progressing  Goal: Absence of Hospital-Acquired Illness or Injury  Outcome: Progressing  Intervention: Identify and Manage Fall Risk  Recent Flowsheet Documentation  Taken 12/14/2024 0600 by Carmella Clark LPN  Safety Promotion/Fall Prevention:   safety round/check completed   room organization consistent   nonskid shoes/slippers when out of bed   fall prevention program maintained   clutter free environment maintained   assistive device/personal items within reach   activity supervised  Taken 12/14/2024 0400 by Eduardo, Carmella, LPN  Safety Promotion/Fall Prevention:   safety round/check completed   room organization consistent   nonskid shoes/slippers when out of bed   fall prevention program maintained   clutter free environment maintained   assistive device/personal items within reach   activity supervised  Intervention: Prevent Skin Injury  Recent Flowsheet Documentation  Taken 12/14/2024 0600 by Carmella Clark LPN  Body Position: position changed independently  Taken 12/14/2024 0400 by Carmella Clark LPN  Body Position: position changed  independently  Skin Protection: transparent dressing maintained  Intervention: Prevent and Manage VTE (Venous Thromboembolism) Risk  Recent Flowsheet Documentation  Taken 12/14/2024 0300 by Carmella Clark LPN  VTE Prevention/Management: (Order d/c'd d/t patient on heparin gtt) --  Intervention: Prevent Infection  Recent Flowsheet Documentation  Taken 12/14/2024 0600 by Carmella Clark LPN  Infection Prevention:   single patient room provided   rest/sleep promoted   hand hygiene promoted  Taken 12/14/2024 0400 by Carmella Clark LPN  Infection Prevention:   single patient room provided   rest/sleep promoted   hand hygiene promoted  Goal: Optimal Comfort and Wellbeing  Outcome: Progressing  Goal: Readiness for Transition of Care  Outcome: Progressing

## 2024-12-14 NOTE — ED PROVIDER NOTES
Provider in Triage Note  Patient is a 39-year-old male presents the ED with complaint of chest pain onset 8:30 PM.  Patient reports he was sitting at home tonight when this started, he had just to stand up.  He reports some dyspnea.  No lightheadedness, diaphoresis or syncope.  Patient reports pain is in the right chest, will go up the side of his neck..    Patient does report a history of hypertension    No fevers.  No cough or congestion.  No nausea vomiting      Due to significant overcrowding in the emergency department patient was initially seen and evaluated in triage.  Provider in triage recommended patient placement in the treatment area to initiate therapy and movement to an ER bed as soon as possible.   Orders placed; medications will be deferred to main provider per protocol.            Subjective   History of Present Illness  Agree with Pit note, no history of CAD, no history of DVT or PE, no calf pain or swelling, no recent trips or travel, no cough or fever.        Review of Systems   Respiratory:  Positive for shortness of breath.    Cardiovascular:  Positive for chest pain.   Neurological:  Positive for light-headedness.       Past Medical History:   Diagnosis Date    Abdominal pain, chronic, right lower quadrant 01/30/2015    Anxiety     Autism 08/13/2014    mild ( Abstracted from [facility].)    Carpal tunnel syndrome 02/22/2018    Constipation 09/21/2018    Depression     Dyslexia 12/26/2017    Hypertension     IBS (irritable bowel syndrome) 09/21/2018    IBS (irritable bowel syndrome)     Knee pain, left 07/25/2017    Lower back pain 01/30/2015    Right cervical radiculopathy 01/07/2019    Shoulder pain, right 02/14/2017    Tinnitus     Vitamin D deficiency 12/26/2017       Allergies   Allergen Reactions    Ceclor [Cefaclor] Hives    Amoxicillin Hives    Cephalexin Hives     Tolerated CTX 12/2020 admission    Cloral Hives       Past Surgical History:   Procedure Laterality Date    ADENOIDECTOMY       COLONOSCOPY      CYSTOSCOPY, URETEROSCOPY, RETROGRADE PYELOGRAM, STENT INSERTION Right 12/15/2020    Procedure: CYSTOSCOPY URETEROSCOPY STONE BASKET EXTRACTION AND STENT INSERTION;  Surgeon: Gerry Giron MD;  Location: [facility] MAIN OR;  Service: Urology;  Laterality: Right;    TONSILLECTOMY  1993    Tonsillectomy and Adenoids-    TONSILLECTOMY AND ADENOIDECTOMY         Family History   Problem Relation Age of Onset    Cancer Mother         breast    Hypertension Mother     Atrial fibrillation Mother     Cancer Father     Hypertension Father     Stroke Father     Hyperlipidemia Father     Diabetes Paternal Uncle     Diabetes Maternal Grandmother     Other Maternal Grandfather         Parkinson's disease    Stroke Maternal Grandfather     Diabetes Maternal Grandfather     Stroke Other        Social History     Socioeconomic History    Marital status: Single   Tobacco Use    Smoking status: Never     Passive exposure: Never    Smokeless tobacco: Never   Vaping Use    Vaping status: Never Used   Substance and Sexual Activity    Alcohol use: Never    Drug use: No    Sexual activity: Defer           Objective   Physical Exam  Constitutional:       Appearance: Normal appearance. He is well-developed.   HENT:      Head: Normocephalic and atraumatic.      Mouth/Throat:      Mouth: Mucous membranes are moist.      Pharynx: Oropharynx is clear.   Eyes:      Extraocular Movements: Extraocular movements intact.      Conjunctiva/sclera: Conjunctivae normal.      Pupils: Pupils are equal, round, and reactive to light.   Cardiovascular:      Rate and Rhythm: Normal rate and regular rhythm.      Heart sounds: Normal heart sounds.   Pulmonary:      Effort: Pulmonary effort is normal.      Breath sounds: Normal breath sounds.   Abdominal:      General: Bowel sounds are normal. There is no distension.      Palpations: Abdomen is soft.      Tenderness: There is no abdominal tenderness.   Musculoskeletal:      Comments: Calves  Soft and nontender without edema bilaterally   Skin:     General: Skin is warm and dry.      Capillary Refill: Capillary refill takes less than 2 seconds.   Neurological:      General: No focal deficit present.      Mental Status: He is alert and oriented to person, place, and time.   Psychiatric:         Mood and Affect: Mood normal.         Behavior: Behavior normal.         Procedures           ED Course                                                       Medical Decision Making  On reevaluation, patient has minimal pain and appears well with interval elevation of troponin consistent with non-STEMI.  Patient will be placed on heparin, case discussed with Dr. Easley will see in consultation in the morning, case discussed with Dr. Robledo with hospital service, agrees with plan.    Problems Addressed:  NSTEMI (non-ST elevated myocardial infarction): complicated acute illness or injury    Amount and/or Complexity of Data Reviewed  Labs: ordered.     Details: Troponin 83  Radiology: ordered.  ECG/medicine tests: ordered and independent interpretation performed.     Details: EKG interpretation: Normal sinus rhythm, rate 88, no acute ST changes seen    Risk  OTC drugs.  Decision regarding hospitalization.        Final diagnoses:   NSTEMI (non-ST elevated myocardial infarction)       ED Disposition  ED Disposition       ED Disposition   Decision to Admit    Condition   --    Comment   Level of Care: Telemetry [5]   Diagnosis: Chest pain [976434]                 Feliz Bansal MD  [address] [facility] [place] [address] [phone]               Medication List        New Prescriptions      atorvastatin 80 MG tablet  Commonly known as: LIPITOR  Take 1 tablet by mouth Every Night.               Where to Get Your Medications        These medications were sent to [facility] [id number] - [place], [place] - [address] - [phone]  - [phone] FX  [address] [place] [address]      Phone:  [phone]   atorvastatin 80 MG tablet            Gentry Murcia MD  12/15/24 0703

## 2024-12-14 NOTE — ED NOTES
Nursing report ED to floor  Davian Arevalo  39 y.o.  male    HPI:   Chief Complaint   Patient presents with    Chest Pain       Admitting doctor:   Travon Robledo DO    Admitting diagnosis:   The encounter diagnosis was NSTEMI (non-ST elevated myocardial infarction).    Code status:   Current Code Status       Date Active Code Status Order ID Comments User Context       12/14/2024 0129 CPR (Attempt to Resuscitate) [id number]  Travon Robledo DO ED        Question Answer    Code Status (Patient has no pulse and is not breathing) CPR (Attempt to Resuscitate)    Medical Interventions (Patient has pulse or is breathing) Full Support                    Allergies:   Ceclor [cefaclor], Amoxicillin, Cephalexin, and Cloral    Isolation:  No active isolations     Fall Risk:  Fall Risk Assessment was completed, and patient is at low risk for falls.   Predictive Model Details         4 (Low) Factor Value    Calculated 12/14/2024 01:43 Age 39    Risk of Fall Model Active Peripheral IV Present     Respiratory Rate 18     Magnesium not on file     Jerrell Scale not on file     Number of Distinct Medication Classes administered 2     Number of administrations of Anti-Coagulants 3     Clinically Relevant Sex Not Female     ALT 47 U/L     Cardiac Assessment X     Diastolic BP 87     Chloride 102 mmol/L     Total Bilirubin 0.5 mg/dL     Calcium 9.5 mg/dL     Days after Admission 0.089     Potassium 3.9 mmol/L     Albumin 4.7 g/dL     Creatinine 0.99 mg/dL         Weight:       12/13/24 2113   Weight: 101 kg (222 lb 0.1 oz)       Intake and Output  No intake or output data in the 24 hours ending 12/14/24 0143    Diet:   Dietary Orders (From admission, onward)       Start     Ordered    12/14/24 0129  NPO Diet NPO Type: Sips with Meds  Diet Effective Now        Question:  NPO Type  Answer:  Sips with Meds    12/14/24 0129                     Most recent vitals:   Vitals:    12/14/24 0000 12/14/24 0016 12/14/24 0046 12/14/24 0117   BP:  123/88 133/89 136/93 128/87   Pulse: 77 84 85 85   Resp:       Temp:       SpO2: 98% 97% 98% 95%   Weight:       Height:           Active LDAs/IV Access:   Lines, Drains & Airways       Active LDAs       Name Placement date Placement time Site Days    Peripheral IV 12/13/24 2125 Left Antecubital 12/13/24 2125  Antecubital  less than 1                    Skin Condition:   Skin Assessments (last day)       None             Labs (abnormal labs have a star):   Labs Reviewed   COMPREHENSIVE METABOLIC PANEL - Abnormal; Notable for the following components:       Result Value    CO2 29.2 (*)     ALT (SGPT) 47 (*)     All other components within normal limits    Narrative:     GFR Categories in Chronic Kidney Disease (CKD)      GFR Category          GFR (mL/min/1.73)    Interpretation  G1                     90 or greater         Normal or high (1)  G2                      60-89                Mild decrease (1)  G3a                   45-59                Mild to moderate decrease  G3b                   30-44                Moderate to severe decrease  G4                    15-29                Severe decrease  G5                    14 or less           Kidney failure          (1)In the absence of evidence of kidney disease, neither GFR category G1 or G2 fulfill the criteria for CKD.    eGFR calculation 2021 CKD-EPI creatinine equation, which does not include race as a factor   CBC WITH AUTO DIFFERENTIAL - Abnormal; Notable for the following components:    RDW 12.2 (*)     All other components within normal limits   HIGH SENSITIVITIY TROPONIN T 1HR - Abnormal; Notable for the following components:    HS Troponin T 83 (*)     All other components within normal limits    Narrative:     High Sensitive Troponin T Reference Range:  <14.0 ng/L- Negative Female for AMI  <22.0 ng/L- Negative Male for AMI  >=14 - Abnormal Female indicating possible myocardial injury.  >=22 - Abnormal Male indicating possible myocardial injury.    Clinicians would have to utilize clinical acumen, EKG, Troponin, and serial changes to determine if it is an Acute Myocardial Infarction or myocardial injury due to an underlying chronic condition.        BNP (IN-HOUSE) - Normal    Narrative:     This assay is used as an aid in the diagnosis of individuals suspected of having heart failure. It can be used as an aid in the diagnosis of acute decompensated heart failure (ADHF) in patients presenting with signs and symptoms of ADHF to the emergency department (ED). In addition, NT-proBNP of <300 pg/mL indicates ADHF is not likely.    Age Range Result Interpretation  NT-proBNP Concentration (pg/mL:      <50             Positive            >450                   Gray                 300-450                    Negative             <300    50-75           Positive            >900                  Gray                300-900                  Negative            <300      >75             Positive            >1800                  Gray                300-1800                  Negative            <300   TROPONIN - Normal    Narrative:     High Sensitive Troponin T Reference Range:  <14.0 ng/L- Negative Female for AMI  <22.0 ng/L- Negative Male for AMI  >=14 - Abnormal Female indicating possible myocardial injury.  >=22 - Abnormal Male indicating possible myocardial injury.   Clinicians would have to utilize clinical acumen, EKG, Troponin, and serial changes to determine if it is an Acute Myocardial Infarction or myocardial injury due to an underlying chronic condition.        LIPASE - Normal   D-DIMER, QUANTITATIVE - Normal    Narrative:     According to the assay 's published package insert, a normal (<0.50 MCGFEU/mL) D-dimer result in conjunction with a non-high clinical probability assessment, excludes deep vein thrombosis (DVT) and pulmonary embolism (PE) with high sensitivity.    D-dimer values increase with age and this can make VTE exclusion of an older  "population difficult. To address this, the [employer] College [employer], based on best available evidence and recent guidelines, recommends that clinicians use age-adjusted D-dimer thresholds in patients greater than 50 years of age with: a) a low probability of PE who do not meet all Pulmonary Embolism Rule Out Criteria, or b) in those with intermediate probability of PE.   The formula for an age-adjusted D-dimer cut-off is \"age/100\".  For example, a 60 year old patient would have an age-adjusted cut-off of 0.60 MCGFEU/mL and an 80 year old 0.80 MCGFEU/mL.   PROTIME-INR - Normal   APTT - Normal   CBC WITH AUTO DIFFERENTIAL   LIPID PANEL   APTT   CBC AND DIFFERENTIAL    Narrative:     The following orders were created for panel order CBC & Differential.  Procedure                               Abnormality         Status                     ---------                               -----------         ------                     CBC Auto Differential[[id number]]        Abnormal            Final result                 Please view results for these tests on the individual orders.   CBC AND DIFFERENTIAL    Narrative:     The following orders were created for panel order CBC & Differential.  Procedure                               Abnormality         Status                     ---------                               -----------         ------                     CBC Auto Differential[[id number]]                                                         Please view results for these tests on the individual orders.       LOC: Person, Place, Time, and Situation    Telemetry:  Telemetry    Cardiac Monitoring Ordered: yes    EKG:   ECG 12 Lead Chest Pain   Preliminary Result   HEART RATE=78  bpm   RR Wbjrtxni=976  ms   CO Rkwwgbfs=418  ms   P Horizontal Axis=-3  deg   P Front Axis=46  deg   QRSD Interval=89  ms   QT Bmjjpwtu=663  ms   ZLoC=614  ms   QRS Axis=-24  deg   T Wave Axis=51  deg   - ABNORMAL ECG -   Sinus rhythm "   Inferior infarct, old   Date and Time of Study:2024-12-14 01:27:30      ECG 12 Lead Chest Pain   Preliminary Result   HEART RATE=88  bpm   RR Jjgnvwur=266  ms   OR Ivqyjgjx=077  ms   P Horizontal Axis=-9  deg   P Front Axis=-1  deg   QRSD Interval=95  ms   QT Atfolfjf=331  ms   YXaB=085  ms   QRS Axis=3  deg   T Wave Axis=40  deg   - NORMAL ECG -   Sinus rhythm   Date and Time of Study:2024-12-13 21:21:55      Telemetry Scan   Final Result          Medications Given in the ED:   Medications   sodium chloride 0.9 % flush 10 mL (has no administration in time range)   heparin 65372 units/250 mL (100 units/mL) in 0.45 % NaCl infusion (9.9 Units/kg/hr × 101 kg Intravenous Currently Infusing 12/14/24 0143)   heparin bolus from bag solution 2,500 Units (has no administration in time range)   heparin bolus from bag solution 5,000 Units (has no administration in time range)   sodium chloride 0.9 % flush 10 mL (has no administration in time range)   sodium chloride 0.9 % flush 10 mL (has no administration in time range)   sodium chloride 0.9 % infusion 40 mL (has no administration in time range)   ondansetron ODT (ZOFRAN-ODT) disintegrating tablet 4 mg (has no administration in time range)     Or   ondansetron (ZOFRAN) injection 4 mg (has no administration in time range)   nitroglycerin (NITROSTAT) SL tablet 0.4 mg (has no administration in time range)   Potassium Replacement - Follow Nurse / BPA Driven Protocol (has no administration in time range)   Magnesium Cardiology Dose Replacement - Follow Nurse / BPA Driven Protocol (has no administration in time range)   Phosphorus Replacement - Follow Nurse / BPA Driven Protocol (has no administration in time range)   Calcium Replacement - Follow Nurse / BPA Driven Protocol (has no administration in time range)   acetaminophen (TYLENOL) tablet 650 mg (has no administration in time range)     Or   acetaminophen (TYLENOL) 160 MG/5ML oral solution 650 mg (has no administration in  time range)     Or   acetaminophen (TYLENOL) suppository 650 mg (has no administration in time range)   sennosides-docusate (PERICOLACE) 8.6-50 MG per tablet 2 tablet (has no administration in time range)     And   polyethylene glycol (MIRALAX) packet 17 g (has no administration in time range)     And   bisacodyl (DULCOLAX) EC tablet 5 mg (has no administration in time range)     And   bisacodyl (DULCOLAX) suppository 10 mg (has no administration in time range)   atorvastatin (LIPITOR) tablet 80 mg (has no administration in time range)   aspirin chewable tablet 324 mg (324 mg Oral Given 12/14/24 0101)   heparin bolus from bag solution 5,000 Units (5,000 Units Intravenous Bolus from Bag 12/14/24 0138)       Imaging results:  XR Chest 1 View    Result Date: 12/13/2024  Impression: No acute process identified Electronically Signed: Mal Chow MD  12/13/2024 9:37 PM EST  Workstation ID: [id number]     Social issues:   Social History     Socioeconomic History    Marital status: Single   Tobacco Use    Smoking status: Never     Passive exposure: Never    Smokeless tobacco: Never   Vaping Use    Vaping status: Never Used   Substance and Sexual Activity    Alcohol use: Never    Drug use: No       NIH Stroke Scale:  Interval: (not recorded)  1a. Level of Consciousness: (not recorded)  1b. LOC Questions: (not recorded)  1c. LOC Commands: (not recorded)  2. Best Gaze: (not recorded)  3. Visual: (not recorded)  4. Facial Palsy: (not recorded)  5a. Motor Arm, Left: (not recorded)  5b. Motor Arm, Right: (not recorded)  6a. Motor Leg, Left: (not recorded)  6b. Motor Leg, Right: (not recorded)  7. Limb Ataxia: (not recorded)  8. Sensory: (not recorded)  9. Best Language: (not recorded)  10. Dysarthria: (not recorded)  11. Extinction and Inattention (formerly Neglect): (not recorded)    Total (NIH Stroke Scale): (not recorded)     Additional notable assessment information:     Nursing report ED to floor:  Carmella Denson RN    12/14/24 01:43 EST

## 2024-12-14 NOTE — PROGRESS NOTES
[facility] MEDICINE SERVICE  DAILY PROGRESS NOTE    NAME: Davian Arevalo  : 1985  MRN: [id number]      LOS: 0 days     PROVIDER OF SERVICE: Koby Nguyen MD    Chief Complaint: Chest pain    Subjective:     Interval History:  History taken from: patient    Medically stable.        Review of Systems:   Review of Systems    Objective:     Vital Signs  Temp:  [97.5 °F (36.4 °C)-97.9 °F (36.6 °C)] 97.6 °F (36.4 °C)  Heart Rate:  [76-97] 78  Resp:  [9-18] 14  BP: (121-149)/(67-98) 129/78   Body mass index is 32.27 kg/m².    Physical Exam  Physical Exam  Constitutional:       Appearance: Normal appearance.   Cardiovascular:      Rate and Rhythm: Normal rate and regular rhythm.      Pulses: Normal pulses.      Heart sounds: Normal heart sounds.   Pulmonary:      Effort: Pulmonary effort is normal.      Breath sounds: Normal breath sounds.   Abdominal:      General: Abdomen is flat.      Palpations: Abdomen is soft.   Neurological:      Mental Status: He is alert. Mental status is at baseline.            Diagnostic Data    Results from last 7 days   Lab Units 24  0315 24  2125   WBC 10*3/mm3 12.24* 9.56   HEMOGLOBIN g/dL 14.8 15.6   HEMATOCRIT % 44.9 47.1   PLATELETS 10*3/mm3 324 323   GLUCOSE mg/dL  --  93   CREATININE mg/dL  --  0.99   BUN mg/dL  --  13   SODIUM mmol/L  --  142   POTASSIUM mmol/L  --  3.9   AST (SGOT) U/L  --  31   ALT (SGPT) U/L  --  47*   ALK PHOS U/L  --  92   BILIRUBIN mg/dL  --  0.5   ANION GAP mmol/L  --  10.8       XR Chest 1 View    Result Date: 2024  Impression: No acute process identified Electronically Signed: Mal Chow MD  2024 9:37 PM EST  Workstation ID: [id number]       I reviewed the patient's new clinical results.    Assessment/Plan:     Active and Resolved Problems  Active Hospital Problems    Diagnosis  POA    **Chest pain [R07.9]  Yes      Resolved Hospital Problems   No resolved problems to display.       Chest pain  NSTEMI?  -Patient was  admitted overnight with right-sided chest pain and EKG without ST elevations.  He experienced a transiently increased troponin from 1 draw to the next before his troponin levels returning to normal.  Patient was placed on heparin drip by night team with aspirin statin and echocardiogram ordered for the morning.  -Mixed features of this case with elevated troponins and chest pain with radiation.  Overall risk factors for cardiac events at young age seem limited in this patient.  He has undergone a stress test many years ago which was negative workup with Dr. Mendoza.  -Cardiology consulted for the case, will await their guidance    Hypertension  Anxiety  Resume home medication as needed.    VTE Prophylaxis:  Pharmacologic VTE prophylaxis orders are present.             Disposition Planning:     Barriers to Discharge: Cardiac evaluation  Anticipated Date of Discharge: 12/15/2024?  Place of Discharge: Home?      Time: 50 minutes     Code Status and Medical Interventions: CPR (Attempt to Resuscitate); Full Support   Ordered at: 12/14/24 0129     Code Status (Patient has no pulse and is not breathing):    CPR (Attempt to Resuscitate)     Medical Interventions (Patient has pulse or is breathing):    Full Support       Signature: Electronically signed by Koby Nguyen MD, 12/14/24, 14:53 EST.  [facility] Hospitalist Team

## 2024-12-15 PROBLEM — I21.4 NSTEMI (NON-ST ELEVATED MYOCARDIAL INFARCTION): Status: ACTIVE | Noted: 2024-12-13

## 2024-12-15 LAB
APTT PPP: 74 SECONDS (ref 22.7–35.4)
APTT PPP: 75.7 SECONDS (ref 22.7–35.4)
GLUCOSE BLDC GLUCOMTR-MCNC: 111 MG/DL (ref 70–105)
GLUCOSE BLDC GLUCOMTR-MCNC: 112 MG/DL (ref 70–105)
GLUCOSE BLDC GLUCOMTR-MCNC: 132 MG/DL (ref 70–105)

## 2024-12-15 PROCEDURE — C1769 GUIDE WIRE: HCPCS | Performed by: INTERNAL MEDICINE

## 2024-12-15 PROCEDURE — 82948 REAGENT STRIP/BLOOD GLUCOSE: CPT | Performed by: STUDENT IN AN ORGANIZED HEALTH CARE EDUCATION/TRAINING PROGRAM

## 2024-12-15 PROCEDURE — 85730 THROMBOPLASTIN TIME PARTIAL: CPT | Performed by: INTERNAL MEDICINE

## 2024-12-15 PROCEDURE — 99152 MOD SED SAME PHYS/QHP 5/>YRS: CPT | Performed by: INTERNAL MEDICINE

## 2024-12-15 PROCEDURE — C1894 INTRO/SHEATH, NON-LASER: HCPCS | Performed by: INTERNAL MEDICINE

## 2024-12-15 PROCEDURE — 82948 REAGENT STRIP/BLOOD GLUCOSE: CPT

## 2024-12-15 PROCEDURE — 25010000002 FENTANYL CITRATE (PF) 100 MCG/2ML SOLUTION: Performed by: INTERNAL MEDICINE

## 2024-12-15 PROCEDURE — G0378 HOSPITAL OBSERVATION PER HR: HCPCS

## 2024-12-15 PROCEDURE — 96366 THER/PROPH/DIAG IV INF ADDON: CPT

## 2024-12-15 PROCEDURE — 93458 L HRT ARTERY/VENTRICLE ANGIO: CPT | Performed by: INTERNAL MEDICINE

## 2024-12-15 PROCEDURE — 25010000002 LIDOCAINE 1 % SOLUTION: Performed by: INTERNAL MEDICINE

## 2024-12-15 PROCEDURE — 25810000003 SODIUM CHLORIDE 0.9 % SOLUTION: Performed by: INTERNAL MEDICINE

## 2024-12-15 PROCEDURE — 25510000001 IOPAMIDOL PER 1 ML: Performed by: INTERNAL MEDICINE

## 2024-12-15 PROCEDURE — 93005 ELECTROCARDIOGRAM TRACING: CPT | Performed by: INTERNAL MEDICINE

## 2024-12-15 PROCEDURE — 25010000002 MIDAZOLAM PER 1 MG: Performed by: INTERNAL MEDICINE

## 2024-12-15 RX ORDER — INSULIN LISPRO 100 [IU]/ML
2-9 INJECTION, SOLUTION INTRAVENOUS; SUBCUTANEOUS
Status: DISCONTINUED | OUTPATIENT
Start: 2024-12-15 | End: 2024-12-16 | Stop reason: HOSPADM

## 2024-12-15 RX ORDER — ACETAMINOPHEN 325 MG/1
650 TABLET ORAL EVERY 4 HOURS PRN
Status: DISCONTINUED | OUTPATIENT
Start: 2024-12-15 | End: 2024-12-15 | Stop reason: SDUPTHER

## 2024-12-15 RX ORDER — ALUMINA, MAGNESIA, AND SIMETHICONE 2400; 2400; 240 MG/30ML; MG/30ML; MG/30ML
15 SUSPENSION ORAL EVERY 6 HOURS PRN
Status: DISCONTINUED | OUTPATIENT
Start: 2024-12-15 | End: 2024-12-16 | Stop reason: HOSPADM

## 2024-12-15 RX ORDER — NICOTINE POLACRILEX 4 MG
15 LOZENGE BUCCAL
Status: DISCONTINUED | OUTPATIENT
Start: 2024-12-15 | End: 2024-12-16 | Stop reason: HOSPADM

## 2024-12-15 RX ORDER — SODIUM CHLORIDE 9 MG/ML
3 INJECTION, SOLUTION INTRAVENOUS CONTINUOUS
Status: DISPENSED | OUTPATIENT
Start: 2024-12-15 | End: 2024-12-15

## 2024-12-15 RX ORDER — SODIUM CHLORIDE 9 MG/ML
INJECTION, SOLUTION INTRAVENOUS
Status: COMPLETED | OUTPATIENT
Start: 2024-12-15 | End: 2024-12-15

## 2024-12-15 RX ORDER — DEXTROSE MONOHYDRATE 25 G/50ML
25 INJECTION, SOLUTION INTRAVENOUS
Status: DISCONTINUED | OUTPATIENT
Start: 2024-12-15 | End: 2024-12-16 | Stop reason: HOSPADM

## 2024-12-15 RX ORDER — NITROGLYCERIN 0.4 MG/1
0.4 TABLET SUBLINGUAL
Status: DISCONTINUED | OUTPATIENT
Start: 2024-12-15 | End: 2024-12-16 | Stop reason: HOSPADM

## 2024-12-15 RX ORDER — ONDANSETRON 4 MG/1
4 TABLET, ORALLY DISINTEGRATING ORAL EVERY 6 HOURS PRN
Status: DISCONTINUED | OUTPATIENT
Start: 2024-12-15 | End: 2024-12-16 | Stop reason: HOSPADM

## 2024-12-15 RX ORDER — IBUPROFEN 600 MG/1
1 TABLET ORAL
Status: DISCONTINUED | OUTPATIENT
Start: 2024-12-15 | End: 2024-12-16 | Stop reason: HOSPADM

## 2024-12-15 RX ORDER — SODIUM CHLORIDE 9 MG/ML
30 INJECTION, SOLUTION INTRAVENOUS CONTINUOUS
Status: DISPENSED | OUTPATIENT
Start: 2024-12-15 | End: 2024-12-15

## 2024-12-15 RX ORDER — ONDANSETRON 2 MG/ML
4 INJECTION INTRAMUSCULAR; INTRAVENOUS EVERY 6 HOURS PRN
Status: DISCONTINUED | OUTPATIENT
Start: 2024-12-15 | End: 2024-12-16 | Stop reason: HOSPADM

## 2024-12-15 RX ORDER — FENTANYL CITRATE 50 UG/ML
INJECTION, SOLUTION INTRAMUSCULAR; INTRAVENOUS
Status: DISCONTINUED | OUTPATIENT
Start: 2024-12-15 | End: 2024-12-15 | Stop reason: HOSPADM

## 2024-12-15 RX ORDER — IOPAMIDOL 755 MG/ML
INJECTION, SOLUTION INTRAVASCULAR
Status: DISCONTINUED | OUTPATIENT
Start: 2024-12-15 | End: 2024-12-15 | Stop reason: HOSPADM

## 2024-12-15 RX ORDER — DIPHENHYDRAMINE HCL 25 MG
25 CAPSULE ORAL EVERY 6 HOURS PRN
Status: DISCONTINUED | OUTPATIENT
Start: 2024-12-15 | End: 2024-12-16 | Stop reason: HOSPADM

## 2024-12-15 RX ORDER — MIDAZOLAM HYDROCHLORIDE 1 MG/ML
INJECTION, SOLUTION INTRAMUSCULAR; INTRAVENOUS
Status: DISCONTINUED | OUTPATIENT
Start: 2024-12-15 | End: 2024-12-15 | Stop reason: HOSPADM

## 2024-12-15 RX ORDER — LIDOCAINE HYDROCHLORIDE 10 MG/ML
INJECTION, SOLUTION INFILTRATION; PERINEURAL
Status: DISCONTINUED | OUTPATIENT
Start: 2024-12-15 | End: 2024-12-15 | Stop reason: HOSPADM

## 2024-12-15 RX ORDER — NICOTINE 21 MG/24HR
1 PATCH, TRANSDERMAL 24 HOURS TRANSDERMAL DAILY PRN
Status: DISCONTINUED | OUTPATIENT
Start: 2024-12-15 | End: 2024-12-16 | Stop reason: HOSPADM

## 2024-12-15 RX ADMIN — Medication 10 ML: at 20:41

## 2024-12-15 RX ADMIN — NITROGLYCERIN 1 INCH: 20 OINTMENT TOPICAL at 21:56

## 2024-12-15 RX ADMIN — NORTRIPTYLINE HYDROCHLORIDE 100 MG: 25 CAPSULE ORAL at 20:37

## 2024-12-15 RX ADMIN — Medication 10 ML: at 09:01

## 2024-12-15 RX ADMIN — ATENOLOL 25 MG: 25 TABLET ORAL at 20:37

## 2024-12-15 RX ADMIN — DICYCLOMINE HYDROCHLORIDE 20 MG: 10 CAPSULE ORAL at 15:12

## 2024-12-15 RX ADMIN — DICYCLOMINE HYDROCHLORIDE 20 MG: 10 CAPSULE ORAL at 20:36

## 2024-12-15 RX ADMIN — ATORVASTATIN CALCIUM 80 MG: 40 TABLET, FILM COATED ORAL at 20:36

## 2024-12-15 NOTE — PLAN OF CARE
Problem: Adult Inpatient Plan of Care  Goal: Plan of Care Review  Outcome: Progressing  Flowsheets (Taken 12/15/2024 0623)  Progress: no change  Outcome Evaluation: Pt continued to c/o of chest pain with Dr. Easley at bedside. Pt is now schedule to have Cath Lab this AM and was NPO at midnight. Throughout this night shift, patient slept through the night with no further c/o chest pain and VS remained stable. Heparin gtt titrated appropriately per ptt labs.  Plan of Care Reviewed With: patient  Goal: Patient-Specific Goal (Individualized)  Outcome: Progressing  Goal: Absence of Hospital-Acquired Illness or Injury  Outcome: Progressing  Goal: Optimal Comfort and Wellbeing  Outcome: Progressing  Goal: Readiness for Transition of Care  Outcome: Progressing   Goal Outcome Evaluation:  Plan of Care Reviewed With: patient        Progress: no change  Outcome Evaluation: Pt continued to c/o of chest pain with Dr. Easley at bedside. Pt is now schedule to have Cath Lab this AM and was NPO at midnight. Throughout this night shift, patient slept through the night with no further c/o chest pain and VS remained stable. Heparin gtt titrated appropriately per ptt labs.

## 2024-12-15 NOTE — PLAN OF CARE
Problem: Adult Inpatient Plan of Care  Goal: Plan of Care Review  Outcome: Progressing  Flowsheets (Taken 12/15/2024 1716)  Progress: improving  Outcome Evaluation: Pt. had radial heart cath with Dr. Easley today without any needed interventions. Radial band removed with no bleeding noted. VSS, no further complaint of chest pain. Patient hopeful to discharge home. Will continue to monitor closely.  Plan of Care Reviewed With: patient  Goal: Patient-Specific Goal (Individualized)  Outcome: Progressing  Goal: Absence of Hospital-Acquired Illness or Injury  Outcome: Progressing  Intervention: Identify and Manage Fall Risk  Recent Flowsheet Documentation  Taken 12/15/2024 1700 by Teresa Aponte RN  Safety Promotion/Fall Prevention:   assistive device/personal items within reach   clutter free environment maintained   safety round/check completed   nonskid shoes/slippers when out of bed  Taken 12/15/2024 1200 by Teresa Aponte RN  Safety Promotion/Fall Prevention:   assistive device/personal items within reach   clutter free environment maintained   safety round/check completed   nonskid shoes/slippers when out of bed  Taken 12/15/2024 0750 by Teresa Aponte RN  Safety Promotion/Fall Prevention:   assistive device/personal items within reach   clutter free environment maintained   safety round/check completed   nonskid shoes/slippers when out of bed  Intervention: Prevent Skin Injury  Recent Flowsheet Documentation  Taken 12/15/2024 1700 by Teresa Aponte RN  Body Position: position changed independently  Taken 12/15/2024 1200 by Teresa Aponte RN  Body Position: position changed independently  Taken 12/15/2024 0750 by Teresa Aponte RN  Body Position: position changed independently  Goal: Optimal Comfort and Wellbeing  Outcome: Progressing  Intervention: Provide Person-Centered Care  Recent Flowsheet Documentation  Taken 12/15/2024 1200 by Teresa Aponte RN  Trust  Relationship/Rapport: care explained  Taken 12/15/2024 0750 by Teresa Aponte RN  Trust Relationship/Rapport: care explained  Goal: Readiness for Transition of Care  Outcome: Progressing     Problem: Comorbidity Management  Goal: Maintenance of Behavioral Health Symptom Control  Outcome: Progressing  Goal: Blood Pressure in Desired Range  Outcome: Progressing   Goal Outcome Evaluation:  Plan of Care Reviewed With: patient        Progress: improving  Outcome Evaluation: Pt. had radial heart cath with Dr. Easley today without any needed interventions. Radial band removed with no bleeding noted. VSS, no further complaint of chest pain. Patient hopeful to discharge home. Will continue to monitor closely.

## 2024-12-15 NOTE — PROGRESS NOTES
[facility] MEDICINE SERVICE  DAILY PROGRESS NOTE    NAME: Davian Arevalo  : 1985  MRN: [id number]      LOS: 0 days     PROVIDER OF SERVICE: Koby Nguyen MD    Chief Complaint: Chest pain    Subjective:     Interval History:  History taken from: patient    Medically stable.        Review of Systems:   Review of Systems    Objective:     Vital Signs  Temp:  [97.6 °F (36.4 °C)-97.8 °F (36.6 °C)] 97.8 °F (36.6 °C)  Heart Rate:  [] 74  Resp:  [10-20] 11  BP: ()/(59-90) 131/90  Flow (L/min) (Oxygen Therapy):  [3] 3   Body mass index is 32.27 kg/m².    Physical Exam  Physical Exam  Constitutional:       Appearance: Normal appearance.   Cardiovascular:      Rate and Rhythm: Normal rate and regular rhythm.      Pulses: Normal pulses.      Heart sounds: Normal heart sounds.   Pulmonary:      Effort: Pulmonary effort is normal.      Breath sounds: Normal breath sounds.   Abdominal:      General: Abdomen is flat.      Palpations: Abdomen is soft.   Neurological:      Mental Status: He is alert. Mental status is at baseline.            Diagnostic Data    Results from last 7 days   Lab Units 24  0315 24  2125   WBC 10*3/mm3 12.24* 9.56   HEMOGLOBIN g/dL 14.8 15.6   HEMATOCRIT % 44.9 47.1   PLATELETS 10*3/mm3 324 323   GLUCOSE mg/dL  --  93   CREATININE mg/dL  --  0.99   BUN mg/dL  --  13   SODIUM mmol/L  --  142   POTASSIUM mmol/L  --  3.9   AST (SGOT) U/L  --  31   ALT (SGPT) U/L  --  47*   ALK PHOS U/L  --  92   BILIRUBIN mg/dL  --  0.5   ANION GAP mmol/L  --  10.8       XR Chest 1 View    Result Date: 2024  Impression: No acute process identified Electronically Signed: Mal Chow MD  2024 9:37 PM EST  Workstation ID: [id number]       I reviewed the patient's new clinical results.    Assessment/Plan:     Active and Resolved Problems  Active Hospital Problems    Diagnosis  POA    **Chest pain [R07.9]  Yes    NSTEMI (non-ST elevated myocardial infarction) [I21.4]  Unknown       Resolved Hospital Problems   No resolved problems to display.       Chest pain  NSTEMI?  -Patient was admitted overnight with right-sided chest pain and EKG without ST elevations.  He experienced a transiently increased troponin from 1 draw to the next before his troponin levels returning to normal.  Patient was placed on heparin drip by night team with aspirin statin and echocardiogram ordered for the morning.  -Mixed features of this case with elevated troponins and chest pain with radiation.  Overall risk factors for cardiac events at young age seem limited in this patient.  He has undergone a stress test many years ago which was negative workup with Dr. Mendoza.  -Cardiology taking patient for heart cath today.  Will follow the results.    Hypertension  Anxiety  Resume home medication as needed.    VTE Prophylaxis:  Pharmacologic VTE prophylaxis orders are present.             Disposition Planning:     Barriers to Discharge: Cardiac evaluation heart cath 12/15/2024  Anticipated Date of Discharge: 12/16/2024  Place of Discharge: Home?      Time: 50 minutes     Code Status and Medical Interventions: CPR (Attempt to Resuscitate); Full Support   Ordered at: 12/14/24 0129     Code Status (Patient has no pulse and is not breathing):    CPR (Attempt to Resuscitate)     Medical Interventions (Patient has pulse or is breathing):    Full Support       Signature: Electronically signed by Koby Nguyen MD, 12/15/24, 12:38 EST.  [facility]ist Team

## 2024-12-16 ENCOUNTER — READMISSION MANAGEMENT (OUTPATIENT)
Dept: CALL CENTER | Facility: HOSPITAL | Age: 39
End: 2024-12-16
Payer: MEDICAID

## 2024-12-16 VITALS
HEIGHT: 72 IN | WEIGHT: 237.9 LBS | RESPIRATION RATE: 12 BRPM | BODY MASS INDEX: 32.22 KG/M2 | SYSTOLIC BLOOD PRESSURE: 112 MMHG | DIASTOLIC BLOOD PRESSURE: 64 MMHG | TEMPERATURE: 97.5 F | HEART RATE: 83 BPM | OXYGEN SATURATION: 94 %

## 2024-12-16 LAB
ANION GAP SERPL CALCULATED.3IONS-SCNC: 11.5 MMOL/L (ref 5–15)
BASOPHILS # BLD AUTO: 0.06 10*3/MM3 (ref 0–0.2)
BASOPHILS NFR BLD AUTO: 0.6 % (ref 0–1.5)
BUN SERPL-MCNC: 15 MG/DL (ref 6–20)
BUN/CREAT SERPL: 14.9 (ref 7–25)
CALCIUM SPEC-SCNC: 9.6 MG/DL (ref 8.6–10.5)
CHLORIDE SERPL-SCNC: 102 MMOL/L (ref 98–107)
CO2 SERPL-SCNC: 24.5 MMOL/L (ref 22–29)
CREAT SERPL-MCNC: 1.01 MG/DL (ref 0.76–1.27)
DEPRECATED RDW RBC AUTO: 38.5 FL (ref 37–54)
EGFRCR SERPLBLD CKD-EPI 2021: 95.8 ML/MIN/1.73
EOSINOPHIL # BLD AUTO: 0.5 10*3/MM3 (ref 0–0.4)
EOSINOPHIL NFR BLD AUTO: 5.1 % (ref 0.3–6.2)
ERYTHROCYTE [DISTWIDTH] IN BLOOD BY AUTOMATED COUNT: 11.9 % (ref 12.3–15.4)
GLUCOSE BLDC GLUCOMTR-MCNC: 104 MG/DL (ref 70–105)
GLUCOSE BLDC GLUCOMTR-MCNC: 108 MG/DL (ref 70–105)
GLUCOSE SERPL-MCNC: 94 MG/DL (ref 65–99)
HCT VFR BLD AUTO: 46.1 % (ref 37.5–51)
HGB BLD-MCNC: 16.1 G/DL (ref 13–17.7)
IMM GRANULOCYTES # BLD AUTO: 0.04 10*3/MM3 (ref 0–0.05)
IMM GRANULOCYTES NFR BLD AUTO: 0.4 % (ref 0–0.5)
LYMPHOCYTES # BLD AUTO: 2.51 10*3/MM3 (ref 0.7–3.1)
LYMPHOCYTES NFR BLD AUTO: 25.7 % (ref 19.6–45.3)
MCH RBC QN AUTO: 31 PG (ref 26.6–33)
MCHC RBC AUTO-ENTMCNC: 34.9 G/DL (ref 31.5–35.7)
MCV RBC AUTO: 88.7 FL (ref 79–97)
MONOCYTES # BLD AUTO: 0.88 10*3/MM3 (ref 0.1–0.9)
MONOCYTES NFR BLD AUTO: 9 % (ref 5–12)
NEUTROPHILS NFR BLD AUTO: 5.77 10*3/MM3 (ref 1.7–7)
NEUTROPHILS NFR BLD AUTO: 59.2 % (ref 42.7–76)
NRBC BLD AUTO-RTO: 0 /100 WBC (ref 0–0.2)
PLATELET # BLD AUTO: 317 10*3/MM3 (ref 140–450)
PMV BLD AUTO: 9 FL (ref 6–12)
POTASSIUM SERPL-SCNC: 4.4 MMOL/L (ref 3.5–5.2)
QT INTERVAL: 411 MS
QTC INTERVAL: 414 MS
RBC # BLD AUTO: 5.2 10*6/MM3 (ref 4.14–5.8)
SODIUM SERPL-SCNC: 138 MMOL/L (ref 136–145)
WBC NRBC COR # BLD AUTO: 9.76 10*3/MM3 (ref 3.4–10.8)

## 2024-12-16 PROCEDURE — 82948 REAGENT STRIP/BLOOD GLUCOSE: CPT

## 2024-12-16 PROCEDURE — G0378 HOSPITAL OBSERVATION PER HR: HCPCS

## 2024-12-16 PROCEDURE — 85025 COMPLETE CBC W/AUTO DIFF WBC: CPT | Performed by: INTERNAL MEDICINE

## 2024-12-16 PROCEDURE — 80048 BASIC METABOLIC PNL TOTAL CA: CPT | Performed by: INTERNAL MEDICINE

## 2024-12-16 PROCEDURE — 82948 REAGENT STRIP/BLOOD GLUCOSE: CPT | Performed by: NURSE PRACTITIONER

## 2024-12-16 RX ADMIN — NITROGLYCERIN 1 INCH: 20 OINTMENT TOPICAL at 05:44

## 2024-12-16 RX ADMIN — Medication 10 ML: at 08:40

## 2024-12-16 RX ADMIN — LISINOPRIL 20 MG: 20 TABLET ORAL at 08:39

## 2024-12-16 RX ADMIN — DICYCLOMINE HYDROCHLORIDE 20 MG: 10 CAPSULE ORAL at 08:39

## 2024-12-16 NOTE — CASE MANAGEMENT/SOCIAL WORK
Case Management Discharge Note      Final Note: routine home         Selected Continued Care - Discharged on 12/16/2024 Admission date: 12/13/2024 - Discharge disposition: Home or Self Care     Patient discharge before full CM assessment    Transportation Services  Private: Car    Final Discharge Disposition Code: 01 - home or self-care

## 2024-12-16 NOTE — PROGRESS NOTES
"  Cardiology Progress Note    Patient Identification:  Name: Davian Arevalo  Age: 39 y.o.  Sex: male  :  1985  MRN: [id number]                 Follow Up / Chief Complaint: Chest pain   Chief Complaint   Patient presents with    Chest Pain       Interval History:  Patient presented with chest pain.  Patient underwent cardiac cath due to elevated 2nd troponin.  Cardiac cath was unremarkable with no significant epicardial occlusive disease.  Low normal LVEF of 50% however echocardiogram Showed EF of 66 to 70% with normal diastolic function as well      NP NOTE:  Patient seen and examined with Dr. Marquez this morning.  Patient denies any specific chest pain but reports pain in his arms and legs.  \"Pain all over\".  Right radial cath site without any hematoma or bruising.  Patient can discharge from cardiology standpoint and follow up with primary cardiologist- Dr. Bansal in 2-4 weeks.  Continue atenolol statin lisinopril       Electronically signed by Charla Laws, YONAS, 24, 1:29 PM EST.    Cardiology attending addendum :    I have personally performed a face-to-face diagnostic evaluation, physical exam and reviewed data on this patient.  I have reviewed documentation done by me and nurse practitioner  and corrected as needed.  And agree with the different components of documentation.Greater than 50% of the time spent in the care of this patient was provided by attending consultant/me.         Subjective: Patient seen and examined.  Chart reviewed.  Labs reviewed.  Discussed with RN taking care of patient.  Patient is complaining of diffuse aches in arms and legs and pain all over the body.        Objective: High-sensitivity troponin less than 6--83--9 ALT 47  2024: BMP unremarkable CBC unremarkable      History of present illness:      Davian Arevalo is a 39 y.o. male known to Dr. Bansal  without established ischemic heart disease.  PMH includes HTN, cervical spine dysfunction, and IBS.  " Treadmill stress test in 2018 was normal.  Patient presents to the ER with complaints of chest pain and second troponin was elevated.  He was started on heparin gtt and cardiology consulted for further evaluation and management.     Patient tells me he was sitting yesterday and developed a sudden sharp pain in his right chest radiating to his right jaw unaccompanied by other symptoms.  This occurs intermittently but he is unable to identify precipitators.  He does admit to doing extensive cleaning 2 days prior to his chest discomfort.  He reports some exertional shortness of breath over the past few months.  Currently he has right mid sternal chest pain radiating to the bilateral jaw rated a 3-4 out of 10.  There is no tenderness to palpation of the chest wall.        Assessment/Plan:    --Atypical chest pain/elevated troponin  Patient presented with chest pain.  HS troponin was 6 went up to 83 and came down to 9.  EKG done 12/16/2024 reviewed/interpreted by me reveals sinus rhythm with a rate of 61 bpm  Echocardiogram 12/14/2024 reveals EF of 66 to 70%  D-dimer was negative.  Chest x-ray was unremarkable.  Patient was started on IV heparin.  Patient underwent cardiac cath 12/15/2024 which revealed no angiographic evidence of significant epicardial occlusive disease.  EF was low normal at 50%.  Will continue medical management with aspirin, statins, atenolol, lisinopril as tolerated.  Will follow-up in cardiology clinic as outpatient.  Hypertension is well-controlled.  Will follow-up as needed.  Reviewed medications with patient and counseled on importance of compliance in cardiology clinic as outpatient.        Copied text in this portion of the note has been reviewed and is accurate as of 12/16/2024              Past Medical History:  Past Medical History:   Diagnosis Date    Abdominal pain, chronic, right lower quadrant 01/30/2015    Anxiety     Autism 08/13/2014    mild ( Abstracted from [facility].)    Carpal  tunnel syndrome 02/22/2018    Constipation 09/21/2018    Depression     Dyslexia 12/26/2017    Hypertension     IBS (irritable bowel syndrome) 09/21/2018    IBS (irritable bowel syndrome)     Knee pain, left 07/25/2017    Lower back pain 01/30/2015    Right cervical radiculopathy 01/07/2019    Shoulder pain, right 02/14/2017    Tinnitus     Vitamin D deficiency 12/26/2017     Past Surgical History:  Past Surgical History:   Procedure Laterality Date    ADENOIDECTOMY      CARDIAC CATHETERIZATION N/A 12/15/2024    Procedure: Left Heart Cath;  Surgeon: Jeyson Easley DO;  Location: [facility] CATH INVASIVE LOCATION;  Service: Cardiovascular;  Laterality: N/A;  Right radial approach    COLONOSCOPY      CYSTOSCOPY, URETEROSCOPY, RETROGRADE PYELOGRAM, STENT INSERTION Right 12/15/2020    Procedure: CYSTOSCOPY URETEROSCOPY STONE BASKET EXTRACTION AND STENT INSERTION;  Surgeon: Gerry Giron MD;  Location: [facility] MAIN OR;  Service: Urology;  Laterality: Right;    TONSILLECTOMY  1993    Tonsillectomy and Adenoids-    TONSILLECTOMY AND ADENOIDECTOMY          Social History:   Social History     Tobacco Use    Smoking status: Never     Passive exposure: Never    Smokeless tobacco: Never   Substance Use Topics    Alcohol use: Never      Family History:  Family History   Problem Relation Age of Onset    Cancer Mother         breast    Hypertension Mother     Atrial fibrillation Mother     Cancer Father     Hypertension Father     Stroke Father     Hyperlipidemia Father     Diabetes Paternal Uncle     Diabetes Maternal Grandmother     Other Maternal Grandfather         Parkinson's disease    Stroke Maternal Grandfather     Diabetes Maternal Grandfather     Stroke Other           Allergies:  Allergies   Allergen Reactions    Ceclor [Cefaclor] Hives    Amoxicillin Hives    Cephalexin Hives     Tolerated CTX 12/2020 admission    Cloral Hives     Scheduled Meds:          Review of Systems:   ROS  Review of Systems  "  Constitution: Negative for chills and fever.   Cardiovascular: Negative for chest pain and palpitations.   Respiratory: Negative for cough and hemoptysis.    Gastrointestinal: Negative for nausea.        Constitutional:  Temp:  [97.2 °F (36.2 °C)-97.6 °F (36.4 °C)] 97.5 °F (36.4 °C)  Heart Rate:  [] 83  Resp:  [12-17] 12  BP: ()/(64-81) 112/64    Physical Exam   /64 (BP Location: Left arm, Patient Position: Lying)   Pulse 83   Temp 97.5 °F (36.4 °C) (Oral)   Resp 12   Ht 182.9 cm (72\")   Wt 108 kg (237 lb 14.4 oz)   SpO2 94%   BMI 32.27 kg/m²   General:  Appears in no acute distress  Eyes: Sclera is anicteric,  conjunctiva is clear   HEENT:  No JVD. Thyroid not visibly enlarged. No mucosal pallor or cyanosis  Respiratory: Respirations regular and unlabored at rest.  Clear to auscultation  Cardiovascular: S1,S2 Regular rate and rhythm. No murmur, rub or gallop auscultated.  . No pretibial pitting edema  Gastrointestinal: Abdomen nondistended.  Musculoskeletal:  No abnormal movements  Extremities: No digital clubbing or cyanosis  Skin: Color pink.   Neuro: Alert and awake.    INTAKE AND OUTPUT:    Intake/Output Summary (Last 24 hours) at 12/16/2024 1915  Last data filed at 12/16/2024 1200  Gross per 24 hour   Intake 200 ml   Output --   Net 200 ml       Cardiographics  Telemetry: Reviewed and interpreted by me reveals sinus rhythm    ECG:   ECG 12 Lead Other; Post Procedure   Final Result   HEART RATE=61  bpm   RR Oyutljjn=374  ms   PA Vrvvpzrv=771  ms   P Horizontal Axis=  deg   P Front Axis=45  deg   QRSD Interval=91  ms   QT Ruozwrkl=819  ms   YQlA=461  ms   QRS Axis=-7  deg   T Wave Axis=54  deg   - NORMAL ECG -   Sinus rhythm   When compared with ECG of 14-Dec-2024 01:27:30,   No significant change   Electronically Signed By: Collin Harvey (NOAM) 2024-12-16 09:14:22   Date and Time of Study:2024-12-16 06:19:14      ECG 12 Lead Chest Pain   Final Result   HEART RATE=78  bpm   RR Npdryzso=678 "  ms   UT Hitmbmyd=232  ms   P Horizontal Axis=-3  deg   P Front Axis=46  deg   QRSD Interval=89  ms   QT Jqcmcaky=501  ms   IJkN=785  ms   QRS Axis=-24  deg   T Wave Axis=51  deg   - ABNORMAL ECG -   Sinus rhythm   Inferior  infarct, old   When compared with ECG of 13-Dec-2024 21:21:55,   New or worsened ischemia or infarction   Electronically Signed By: Gentry Murcia (Deep) 2024-12-14 08:23:00   Date and Time of Study:2024-12-14 01:27:30      ECG 12 Lead Chest Pain   Final Result   HEART RATE=88  bpm   RR Cfkmtlqt=930  ms   UT Koqjaiyc=447  ms   P Horizontal Axis=-9  deg   P Front Axis=-1  deg   QRSD Interval=95  ms   QT Bgkvwqhc=703  ms   ZIyG=009  ms   QRS Axis=3  deg   T Wave Axis=40  deg   - NORMAL ECG -   Sinus rhythm   Electronically Signed By: Les Miramontes (Deep) 2024-12-14 08:27:16   Date and Time of Study:2024-12-13 21:21:55      Telemetry Scan   Final Result      Telemetry Scan   Final Result      Telemetry Scan   Final Result      Telemetry Scan   Final Result      Telemetry Scan   Final Result      Telemetry Scan   Final Result      Telemetry Scan   Final Result      Telemetry Scan   Final Result      Telemetry Scan   Final Result      Telemetry Scan   Final Result        I have personally reviewed EKG    Echocardiogram: Results for orders placed during the hospital encounter of 12/13/24    Adult Transthoracic Echo Complete W/ Cont if Necessary Per Protocol    Interpretation Summary    Left ventricular systolic function is normal. Left ventricular ejection fraction appears to be 66 - 70%.    Left ventricular diastolic function was normal.    Estimated right ventricular systolic pressure from tricuspid regurgitation is normal (<35 mmHg). Calculated right ventricular systolic pressure from tricuspid regurgitation is 20 mmHg.      Lab Review   I have reviewed the labs  Results from last 7 days   Lab Units 12/14/24  0315 12/13/24  2359 12/13/24  2125   HSTROP T ng/L 9 83* <6         Results from last 7 days  "  Lab Units 12/16/24  0424   SODIUM mmol/L 138   POTASSIUM mmol/L 4.4   BUN mg/dL 15   CREATININE mg/dL 1.01   CALCIUM mg/dL 9.6         Results from last 7 days   Lab Units 12/16/24  0424 12/14/24 0315 12/13/24 2125   WBC 10*3/mm3 9.76 12.24* 9.56   HEMOGLOBIN g/dL 16.1 14.8 15.6   HEMATOCRIT % 46.1 44.9 47.1   PLATELETS 10*3/mm3 317 324 323     Results from last 7 days   Lab Units 12/15/24  1130 12/15/24  0509 12/14/24 2129 12/14/24 0315 12/13/24  2359   INR   --   --   --   --  0.95   APTT seconds 75.7* 74.0* 38.1*   < > 35.3    < > = values in this interval not displayed.       RADIOLOGY:  Imaging Results (Last 24 Hours)       ** No results found for the last 24 hours. **                  )12/16/2024  MD HARSHA Kitchen/Transcription:   \"Dictated utilizing Dragon dictation\".   "

## 2024-12-16 NOTE — DISCHARGE SUMMARY
"             [facility] Medicine Services  Discharge Summary    Date of Service: 2024  Patient Name: Davian Arevalo  : 1985  MRN: [id number]    Date of Admission: 2024  Discharge Diagnosis: Atypical chest pain  Date of Discharge: 2024  Primary Care Physician: Merlene Zamorano MD    Presenting Problem:   Chest pain [R07.9]  NSTEMI (non-ST elevated myocardial infarction) [I21.4]    Active and Resolved Hospital Problems:  Active Hospital Problems    Diagnosis POA    **Chest pain [R07.9] Yes    NSTEMI (non-ST elevated myocardial infarction) [I21.4] Yes      Resolved Hospital Problems   No resolved problems to display.         Hospital Course     HPI:  Per the H&P written by Travon Robledo, dated 2024:  \"Davian Arevalo is a 39 y.o. male with past medical history of hypertension and anxiety who [facility] with complaints of chest pain.  Patient reports right-sided chest pain that started 8:30 PM last night.  He reports associated dyspnea but denies any dizziness, diaphoresis, or palpitations.  Pain radiates to the neck area.  Initial EKG with NSR and no acute ST-T wave changes.  However patient was noted to have uptrending troponin in the ER.  Started on heparin drip.  Records show previously had stress test in 2018 which was negative.  Patient reports seeing Dr. Bansal in April of this year for palpitations, patient be on atenolol.  Denies any other cardiac workup recently. \"    Hospital Course:  Patient admitted as above.  Cardiology consulted.  Underwent left heart catheterization which did not demonstrate obstructive disease.  Patient reports he had been more active than normal on the preceding days and stated chest pain made worse with certain positions and on palpation.  Chest pain resolved prior to discharge.  States Tylenol helped with pain and patient encouraged to continue Tylenol at home along with conservative measures.  Plans for " outpatient follow-up with cardiology.  Appropriate for discharge at this time.    DISCHARGE Follow Up Recommendations for labs and diagnostics:  - Follow-up with PCP within 2 weeks of discharge  - Follow-up with cardiology within 2 weeks of discharge    Day of Discharge     Vital Signs:  Temp:  [97.2 °F (36.2 °C)-97.6 °F (36.4 °C)] 97.5 °F (36.4 °C)  Heart Rate:  [] 83  Resp:  [12-17] 12  BP: ()/(64-81) 112/64  Flow (L/min) (Oxygen Therapy):  [0] 0    Physical Exam:  General: No acute distress  Neuro: AAOx3, no FND  CV: RRR, no peripheral edema  Pulm: CTAB, no increased work of breathing  Abd: Soft, nontender, nondistended  Skin: No rashes or lesions on exposed skin  Psych: Appropriate mood and affect    Pertinent  and/or Most Recent Results     LAB RESULTS:      Lab 12/16/24  0424 12/15/24  1130 12/15/24  0509 12/14/24 2129 12/14/24  1154 12/14/24  0315 12/13/24  2359 12/13/24  2359 12/13/24 2125   WBC 9.76  --   --   --   --  12.24*  --   --  9.56   HEMOGLOBIN 16.1  --   --   --   --  14.8  --   --  15.6   HEMATOCRIT 46.1  --   --   --   --  44.9  --   --  47.1   PLATELETS 317  --   --   --   --  324  --   --  323   NEUTROS ABS 5.77  --   --   --   --  7.24*  --   --  6.17   IMMATURE GRANS (ABS) 0.04  --   --   --   --  0.03  --   --  0.03   LYMPHS ABS 2.51  --   --   --   --  3.38*  --   --  2.14   MONOS ABS 0.88  --   --   --   --  1.12*  --   --  0.85   EOS ABS 0.50*  --   --   --   --  0.40  --   --  0.31   MCV 88.7  --   --   --   --  90.0  --   --  90.4   PROTIME  --   --   --   --   --   --   --  12.7  --    APTT  --  75.7* 74.0* 38.1* 35.2 >200.0*   < > 35.3  --    D DIMER QUANT  --   --   --   --   --   --   --  <0.27  --     < > = values in this interval not displayed.         Lab 12/16/24  0424 12/13/24  2125   SODIUM 138 142   POTASSIUM 4.4 3.9   CHLORIDE 102 102   CO2 24.5 29.2*   ANION GAP 11.5 10.8   BUN 15 13   CREATININE 1.01 0.99   EGFR 95.8 98.1   GLUCOSE 94 93   CALCIUM 9.6 9.5          Lab 12/13/24 2125   TOTAL PROTEIN 7.7   ALBUMIN 4.7   GLOBULIN 3.0   ALT (SGPT) 47*   AST (SGOT) 31   BILIRUBIN 0.5   ALK PHOS 92   LIPASE 44         Lab 12/14/24  0315 12/13/24  2359 12/13/24 2125   PROBNP  --   --  <36.0   HSTROP T 9 83* <6   PROTIME  --  12.7  --    INR  --  0.95  --          Lab 12/14/24  0315   CHOLESTEROL 173   LDL CHOL 118*   HDL CHOL 45   TRIGLYCERIDES 50             Brief Urine Lab Results  (Last result in the past 365 days)        Color   Clarity   Blood   Leuk Est   Nitrite   Protein   CREAT   Urine HCG        11/05/24 1309 Dark Yellow   Clear   Negative   Negative   Negative   Negative                 Microbiology Results (last 10 days)       ** No results found for the last 240 hours. **          XR Chest 1 View    Result Date: 12/13/2024  Impression: Impression: No acute process identified Electronically Signed: Mal Chow MD  12/13/2024 9:37 PM EST  Workstation ID: [id number]       Results for orders placed during the hospital encounter of 12/13/24    Adult Transthoracic Echo Complete W/ Cont if Necessary Per Protocol    Interpretation Summary    Left ventricular systolic function is normal. Left ventricular ejection fraction appears to be 66 - 70%.    Left ventricular diastolic function was normal.    Estimated right ventricular systolic pressure from tricuspid regurgitation is normal (<35 mmHg). Calculated right ventricular systolic pressure from tricuspid regurgitation is 20 mmHg.      Labs Pending at Discharge:   Pending Results       None            Procedures Performed   Procedure(s):  Left Heart Cath       Consults:   Consults       Date and Time Order Name Status Description    12/14/2024  1:49 AM Inpatient Cardiology Consult Completed             Discharge Details        Discharge Medications        New Medications        Instructions Start Date   atorvastatin 80 MG tablet  Commonly known as: LIPITOR   80 mg, Oral, Nightly             Continue These Medications         Instructions Start Date   acetaminophen 500 MG tablet  Commonly known as: TYLENOL   500 mg, Every 6 Hours PRN      atenolol 25 MG tablet  Commonly known as: TENORMIN   TAKE ONE TABLET BY MOUTH ONCE NIGHTLY      cholecalciferol 25 MCG (1000 UT) tablet  Commonly known as: VITAMIN D3   1,000 Units, Daily      dicyclomine 20 MG tablet  Commonly known as: BENTYL   20 mg, 3 Times Daily      lisinopril 20 MG tablet  Commonly known as: PRINIVIL,ZESTRIL   20 mg, Oral, Daily      meloxicam 15 MG tablet  Commonly known as: Mobic   15 mg, Oral, Daily      nortriptyline 50 MG capsule  Commonly known as: PAMELOR   100 mg, Nightly      ondansetron ODT 4 MG disintegrating tablet  Commonly known as: ZOFRAN-ODT   4 mg, Translingual, Every 8 Hours PRN      vitamin B-12 1000 MCG tablet  Commonly known as: CYANOCOBALAMIN   1,000 mcg, Daily               Allergies   Allergen Reactions    Ceclor [Cefaclor] Hives    Amoxicillin Hives    Cephalexin Hives     Tolerated CTX 12/2020 admission    Cloral Hives       Discharge Disposition:   Home    Diet:  Heart healthy    Discharge Activity:   As tolerated    CODE STATUS:  Code Status and Medical Interventions: CPR (Attempt to Resuscitate); Full Support   Ordered at: 12/15/24 1315     Level Of Support Discussed With:    Patient     Code Status (Patient has no pulse and is not breathing):    CPR (Attempt to Resuscitate)     Medical Interventions (Patient has pulse or is breathing):    Full Support       Future Appointments   Date Time Provider Department Center   4/28/2025  1:40 PM Feliz Bansal MD [facility]       Additional Instructions for the Follow-ups that You Need to Schedule       Discharge Follow-up with PCP   As directed       Currently Documented PCP:    Merlene Zamorano MD    PCP Phone Number:    [phone]     Follow Up Details: Follow up with PCP within 2 weeks of discharge                Time spent on Discharge including face to face service:  >30  minutes    Signature: Electronically signed by Bita Buck MD, 12/16/24, 18:23 EST.  [facility] Hospitalist Team

## 2024-12-16 NOTE — OUTREACH NOTE
Prep Survey      Flowsheet Row Responses   [facility] facility patient discharged from? [facility]   Is LACE score < 7 ? Yes   Eligibility [facility]   Date of Admission 12/13/24   Date of Discharge 12/16/24   Discharge Disposition Home or Self Care   Discharge diagnosis Chest pain-left heart cath this visit   Does the patient have one of the following disease processes/diagnoses(primary or secondary)? Other   Does the patient have Home health ordered? No   Is there a DME ordered? No   Prep survey completed? Yes            ZACH DUKES - Registered Nurse

## 2024-12-17 ENCOUNTER — TRANSITIONAL CARE MANAGEMENT TELEPHONE ENCOUNTER (OUTPATIENT)
Dept: CALL CENTER | Facility: HOSPITAL | Age: 39
End: 2024-12-17
Payer: MEDICAID

## 2024-12-17 NOTE — OUTREACH NOTE
Call Center TCM Note      Flowsheet Row Responses   [facility] patient discharged from? [facility]   Does the patient have one of the following disease processes/diagnoses(primary or secondary)? Other   TCM attempt successful? Yes   Call start time 1401   Call end time 1404   Discharge diagnosis Chest pain-left heart cath this visit   Meds reviewed with patient/caregiver? Yes   Is the patient having any side effects they believe may be caused by any medication additions or changes? No   Does the patient have all medications ordered at discharge? Yes   Is the patient taking all medications as directed (includes completed medication regime)? Yes   Comments 12/20/2024 10:30 AM HOSPITAL FOLLOW UP WITH PCP.   Does the patient have an appointment with their PCP within 7-14 days of discharge? Yes   Has home health visited the patient within 72 hours of discharge? N/A   Psychosocial issues? No   Did the patient receive a copy of their discharge instructions? Yes   Nursing interventions Reviewed instructions with patient   What is the patient's perception of their health status since discharge? Improving   Is the patient/caregiver able to teach back signs and symptoms related to disease process for when to call PCP? Yes   Is the patient/caregiver able to teach back signs and symptoms related to disease process for when to call 911? Yes   Is the patient/caregiver able to teach back the hierarchy of who to call/visit for symptoms/problems? PCP, Specialist, Home health nurse, Urgent Care, ED, 911 Yes   If the patient is a current smoker, are they able to teach back resources for cessation? Not a smoker   TCM call completed? Yes   Call end time 1404   Would this patient benefit from a Referral to Amb Social Work? No   Is the patient interested in additional calls from an ambulatory ? No            Yeimy Davalos RN    12/17/2024, 14:06 EST

## 2024-12-20 ENCOUNTER — OFFICE VISIT (OUTPATIENT)
Dept: FAMILY MEDICINE CLINIC | Facility: CLINIC | Age: 39
End: 2024-12-20
Payer: MEDICAID

## 2024-12-20 ENCOUNTER — LAB (OUTPATIENT)
Dept: FAMILY MEDICINE CLINIC | Facility: CLINIC | Age: 39
End: 2024-12-20
Payer: MEDICAID

## 2024-12-20 VITALS
WEIGHT: 224.7 LBS | TEMPERATURE: 97.4 F | HEART RATE: 75 BPM | BODY MASS INDEX: 30.43 KG/M2 | OXYGEN SATURATION: 98 % | HEIGHT: 72 IN | SYSTOLIC BLOOD PRESSURE: 126 MMHG | DIASTOLIC BLOOD PRESSURE: 79 MMHG

## 2024-12-20 DIAGNOSIS — R73.9 HYPERGLYCEMIA: ICD-10-CM

## 2024-12-20 DIAGNOSIS — I21.4 NSTEMI (NON-ST ELEVATED MYOCARDIAL INFARCTION): Primary | ICD-10-CM

## 2024-12-20 PROBLEM — L23.7 CONTACT DERMATITIS DUE TO POISON IVY: Status: RESOLVED | Noted: 2022-06-15 | Resolved: 2024-12-20

## 2024-12-20 PROBLEM — R59.0 CERVICAL LYMPHADENOPATHY: Status: RESOLVED | Noted: 2023-09-01 | Resolved: 2024-12-20

## 2024-12-20 PROBLEM — J06.9 ACUTE URI: Status: RESOLVED | Noted: 2024-01-09 | Resolved: 2024-12-20

## 2024-12-20 PROBLEM — S16.1XXA NECK STRAIN, INITIAL ENCOUNTER: Status: RESOLVED | Noted: 2019-07-05 | Resolved: 2024-12-20

## 2024-12-20 PROBLEM — L98.9 SKIN LESION: Status: RESOLVED | Noted: 2021-04-08 | Resolved: 2024-12-20

## 2024-12-20 PROBLEM — R22.1 NECK MASS: Status: RESOLVED | Noted: 2024-04-01 | Resolved: 2024-12-20

## 2024-12-20 PROBLEM — S99.921A INJURY OF RIGHT TOE, INITIAL ENCOUNTER: Status: RESOLVED | Noted: 2022-04-08 | Resolved: 2024-12-20

## 2024-12-20 PROBLEM — L90.5 SCAR: Status: RESOLVED | Noted: 2024-06-10 | Resolved: 2024-12-20

## 2024-12-20 LAB
ALBUMIN SERPL-MCNC: 4.6 G/DL (ref 3.5–5.2)
ALBUMIN/GLOB SERPL: 1.5 G/DL
ALP SERPL-CCNC: 82 U/L (ref 39–117)
ALT SERPL W P-5'-P-CCNC: 65 U/L (ref 1–41)
ANION GAP SERPL CALCULATED.3IONS-SCNC: 16.5 MMOL/L (ref 5–15)
AST SERPL-CCNC: 33 U/L (ref 1–40)
BILIRUB SERPL-MCNC: 0.8 MG/DL (ref 0–1.2)
BUN SERPL-MCNC: 14 MG/DL (ref 6–20)
BUN/CREAT SERPL: 12.3 (ref 7–25)
CALCIUM SPEC-SCNC: 9.9 MG/DL (ref 8.6–10.5)
CHLORIDE SERPL-SCNC: 95 MMOL/L (ref 98–107)
CO2 SERPL-SCNC: 26.5 MMOL/L (ref 22–29)
CREAT SERPL-MCNC: 1.14 MG/DL (ref 0.76–1.27)
EGFRCR SERPLBLD CKD-EPI 2021: 82.9 ML/MIN/1.73
GLOBULIN UR ELPH-MCNC: 3 GM/DL
GLUCOSE SERPL-MCNC: 79 MG/DL (ref 65–99)
HBA1C MFR BLD: 5 % (ref 4.8–5.6)
POTASSIUM SERPL-SCNC: 3.9 MMOL/L (ref 3.5–5.2)
PROT SERPL-MCNC: 7.6 G/DL (ref 6–8.5)
SODIUM SERPL-SCNC: 138 MMOL/L (ref 136–145)

## 2024-12-20 PROCEDURE — 83036 HEMOGLOBIN GLYCOSYLATED A1C: CPT | Performed by: FAMILY MEDICINE

## 2024-12-20 PROCEDURE — 80053 COMPREHEN METABOLIC PANEL: CPT | Performed by: FAMILY MEDICINE

## 2024-12-20 RX ORDER — ACETAMINOPHEN 500 MG
500 TABLET ORAL EVERY 6 HOURS PRN
Start: 2024-12-20

## 2024-12-20 NOTE — PROGRESS NOTES
Transitional Care Follow Up Visit  Subjective     Davian Arevalo is a 39 y.o. male who presents for a transitional care management visit.    Within 48 business hours after discharge our office contacted him via telephone to coordinate his care and needs.      I reviewed and discussed the details of that call along with the discharge summary, hospital problems, inpatient lab results, inpatient diagnostic studies, and consultation reports with Davian.     Current outpatient and discharge medications have been reconciled for the patient.  Reviewed by: Merlene Zamorano MD          12/16/2024     5:33 PM   Date of TCM Phone Call   [facility]   Date of Admission 12/13/2024   Date of Discharge 12/16/2024   Discharge Disposition Home or Self Care     Risk for Readmission (LACE) Score: 4 (12/16/2024  6:00 AM)      History of Present Illness   Course During Hospital Stay:  Davian Arevalo is a 39 y.o. male with past medical history of hypertension and anxiety who presented to [facility] on 12/13/2024 with complaints of chest pain.  Patient reports right-sided chest pain that started 8:30 PM last night.  He reports associated dyspnea but denies any dizziness, diaphoresis, or palpitations.  Pain radiates to the neck area.  Initial EKG with NSR and no acute ST-T wave changes.  However patient was noted to have uptrending troponin in the ER.  Started on heparin drip.  Records show previously had stress test in 2018 which was negative.  Patient reports seeing Dr. Bansal in April of this year for palpitations, on atenolol.  Denies any other cardiac workup recently.    Hospital Course:  Cardiology consulted.  Underwent left heart catheterization which did not demonstrate obstructive disease.  Patient reports he had been more active than normal on the preceding days and stated chest pain made worse with certain positions and on palpation.  Chest pain resolved prior to discharge.  States Tylenol helped with pain  "and patient encouraged to continue Tylenol at home along with conservative measures.  Plans for outpatient follow-up with cardiology. He has been having some pain for the last 2 days.      The following portions of the patient's history were reviewed and updated as appropriate: allergies, current medications, past family history, past medical history, past social history, past surgical history, and problem list.    Review of Systems    Objective   /79   Pulse 75   Temp 97.4 °F (36.3 °C) (Temporal)   Ht 182.9 cm (72\")   Wt 102 kg (224 lb 11.2 oz)   SpO2 98%   BMI 30.47 kg/m²   Physical Exam  Vitals and nursing note reviewed.   Constitutional:       General: He is not in acute distress.     Appearance: He is well-developed.   HENT:      Head: Normocephalic.   Eyes:      General: Lids are normal.      Conjunctiva/sclera: Conjunctivae normal.   Neck:      Thyroid: No thyroid mass or thyromegaly.      Trachea: Trachea normal.   Cardiovascular:      Rate and Rhythm: Normal rate and regular rhythm.      Heart sounds: Normal heart sounds.   Pulmonary:      Effort: Pulmonary effort is normal.      Breath sounds: Normal breath sounds.   Abdominal:      Palpations: Abdomen is soft.   Musculoskeletal:      Cervical back: Normal range of motion.   Lymphadenopathy:      Cervical: No cervical adenopathy.   Skin:     General: Skin is warm and dry.   Neurological:      Mental Status: He is alert and oriented to person, place, and time. Mental status is at baseline.   Psychiatric:         Attention and Perception: He is attentive.         Speech: Speech normal.         Behavior: Behavior normal.         Assessment & Plan   Diagnoses and all orders for this visit:    1. NSTEMI (non-ST elevated myocardial infarction) (Primary)  Assessment & Plan:  Coronary Artery Disease (OPTIONAL): Coronary artery disease is stable.  Continue current treatment regimen. Dietary sodium restriction. Regular aerobic exercise. Referral to " cardiology for further evaluation.  Cardiac status will be reassessed in 6 months.  Tylenol 1-2 tabs po q4h prn        2. Hyperglycemia  -     Hemoglobin A1c  -     Cancel: Comprehensive Metabolic Panel    Other orders  -     acetaminophen (TYLENOL) 500 MG tablet; Take 1 tablet by mouth Every 6 (Six) Hours As Needed for Mild Pain.

## 2024-12-20 NOTE — ASSESSMENT & PLAN NOTE
Coronary Artery Disease (OPTIONAL): Coronary artery disease is stable.  Continue current treatment regimen. Dietary sodium restriction. Regular aerobic exercise. Referral to cardiology for further evaluation.  Cardiac status will be reassessed in 6 months.  Tylenol 1-2 tabs po q4h prn

## 2024-12-31 NOTE — PROGRESS NOTES
Subjective:     Encounter Date:01/03/2025      Patient ID: Davian Arevalo is a 39 y.o. male.    Chief Complaint:  History of Present Illness    Davian Arevalo is a 39 y.o. male who presents to the office today for hospital discharge follow-up for which she was evaluated by cardiology for chest pain.  His troponin went from less than 6-83 therefore he underwent cardiac catheterization which showed echocardiogram showed no angiographic evidence of obstructive disease.  Normal LVEF with normal diastolic function and no significant valvular abnormalities.   Patient seen and examined, labs and chart reviewed. Patient states he has been experiencing shortness of breath, dizziness, numbness/tingling of fingers since hospitalization.  He notes he still has intermittent episodes of nonradiating chest pain.  He does still have his gallbladder.  Extensive education provided on recent cardiac workup to include cardiac catheterization without evidence of coronary disease and echocardiogram with normal LVEF.  Patient takes all medications as prescribed.  He has never been a smoker.  His vital signs are stable today.      The following portions of the patient's history were reviewed and updated as appropriate: allergies, current medications, past family history, past medical history, past social history, past surgical history, and problem list.    Past Medical History:   Diagnosis Date    Abdominal pain, chronic, right lower quadrant 01/30/2015    Anxiety     Autism 08/13/2014    mild ( Abstracted from [employer].)    Carpal tunnel syndrome 02/22/2018    Constipation 09/21/2018    Depression     Dyslexia 12/26/2017    Hypertension     IBS (irritable bowel syndrome) 09/21/2018    IBS (irritable bowel syndrome)     Knee pain, left 07/25/2017    Lower back pain 01/30/2015    Right cervical radiculopathy 01/07/2019    Shoulder pain, right 02/14/2017    Tinnitus     Vitamin D deficiency 12/26/2017     Past Surgical History:  "  Procedure Laterality Date    ADENOIDECTOMY      CARDIAC CATHETERIZATION N/A 12/15/2024    Procedure: Left Heart Cath;  Surgeon: Jeyson Easley DO;  Location: [facility] CATH INVASIVE LOCATION;  Service: Cardiovascular;  Laterality: N/A;  Right radial approach    COLONOSCOPY      CYSTOSCOPY, URETEROSCOPY, RETROGRADE PYELOGRAM, STENT INSERTION Right 12/15/2020    Procedure: CYSTOSCOPY URETEROSCOPY STONE BASKET EXTRACTION AND STENT INSERTION;  Surgeon: Gerry Giron MD;  Location: [facility] MAIN OR;  Service: Urology;  Laterality: Right;    TONSILLECTOMY  1993    Tonsillectomy and Adenoids-    TONSILLECTOMY AND ADENOIDECTOMY       /83   Pulse 83   Ht 182.9 cm (72.01\")   Wt 102 kg (225 lb)   SpO2 97%   BMI 30.51 kg/m²     Family History   Problem Relation Age of Onset    Cancer Mother         breast    Hypertension Mother     Atrial fibrillation Mother     Cancer Father     Hypertension Father     Stroke Father     Hyperlipidemia Father     Diabetes Paternal Uncle     Diabetes Maternal Grandmother     Stroke Maternal Grandfather     Diabetes Maternal Grandfather     Heart disease Paternal Grandmother     Stroke Other        Current Outpatient Medications:     acetaminophen (TYLENOL) 500 MG tablet, Take 1 tablet by mouth Every 6 (Six) Hours As Needed for Mild Pain., Disp: , Rfl:     atenolol (TENORMIN) 25 MG tablet, TAKE ONE TABLET BY MOUTH ONCE NIGHTLY, Disp: 90 tablet, Rfl: 2    atorvastatin (LIPITOR) 80 MG tablet, Take 1 tablet by mouth Every Night., Disp: 90 tablet, Rfl: 0    cholecalciferol (VITAMIN D3) 1000 units tablet, Take 1 tablet by mouth Daily., Disp: , Rfl:     dicyclomine (BENTYL) 20 MG tablet, Take 1 tablet by mouth 3 (Three) Times a Day., Disp: , Rfl:     lisinopril (PRINIVIL,ZESTRIL) 20 MG tablet, TAKE 1 TABLET BY MOUTH DAILY, Disp: 90 tablet, Rfl: 3    meloxicam (Mobic) 15 MG tablet, Take 1 tablet by mouth Daily., Disp: 90 tablet, Rfl: 1    nortriptyline (PAMELOR) 50 MG capsule, " Take 2 capsules by mouth Every Night., Disp: , Rfl:     ondansetron ODT (ZOFRAN-ODT) 4 MG disintegrating tablet, Place 1 tablet on the tongue Every 8 (Eight) Hours As Needed for Nausea., Disp: 20 tablet, Rfl: 0    vitamin B-12 (CYANOCOBALAMIN) 1000 MCG tablet, Take 1 tablet by mouth Daily., Disp: , Rfl:     Allergies   Allergen Reactions    Ceclor [Cefaclor] Hives    Amoxicillin Hives    Cephalexin Hives     Tolerated CTX 12/2020 admission    Cloral Hives     Social History     Socioeconomic History    Marital status: Single   Tobacco Use    Smoking status: Never     Passive exposure: Never    Smokeless tobacco: Never   Vaping Use    Vaping status: Never Used   Substance and Sexual Activity    Alcohol use: Never    Drug use: No    Sexual activity: Defer     Review of Systems   Constitutional: Negative for malaise/fatigue.   Cardiovascular:  Negative for chest pain, dyspnea on exertion, leg swelling and palpitations.   Respiratory:  Positive for shortness of breath. Negative for cough.    Gastrointestinal:  Negative for abdominal pain, nausea and vomiting.   Neurological:  Positive for dizziness, light-headedness and numbness. Negative for focal weakness and headaches.   All other systems reviewed and are negative.         Objective:     Vitals reviewed.   Constitutional:       Appearance: Not in distress.   Eyes:      Pupils: Pupils are equal, round, and reactive to light.   HENT:      Nose: Nose normal.    Mouth/Throat:      Pharynx: Oropharynx is clear.   Pulmonary:      Effort: Pulmonary effort is normal.      Breath sounds: Normal breath sounds.   Cardiovascular:      Normal rate. Regular rhythm.   Pulses:     Intact distal pulses.   Edema:     Peripheral edema absent.   Abdominal:      Palpations: Abdomen is soft.   Musculoskeletal: Normal range of motion.      Cervical back: Normal range of motion and neck supple. Skin:     General: Skin is warm and dry.   Neurological:      General: No focal deficit present.       Mental Status: Alert and oriented to person, place and time.       Procedures    Lab Review:    Diagnosis Plan   1. Hospital discharge follow-up        2. NSTEMI (non-ST elevated myocardial infarction)        3. Chest pain, unspecified type        4. Dizziness        5. Primary hypertension        LAB RESULTS (LAST 7 DAYS)    Echocardiogram   Results for orders placed during the hospital encounter of 12/13/24    Adult Transthoracic Echo Complete W/ Cont if Necessary Per Protocol    Interpretation Summary    Left ventricular systolic function is normal. Left ventricular ejection fraction appears to be 66 - 70%.    Left ventricular diastolic function was normal.    Estimated right ventricular systolic pressure from tricuspid regurgitation is normal (<35 mmHg). Calculated right ventricular systolic pressure from tricuspid regurgitation is 20 mmHg.      CBC        BMP        CMP         BNP        TROPONIN        CoAg        Creatinine Clearance  Estimated Creatinine Clearance: 105.4 mL/min (by C-G formula based on SCr of 1.14 mg/dL).    ABG        Radiology  No radiology results for the last day          MDM  Hospital discharge follow-up  Chest pain  Recent hospitalization for chest pain  Troponin <6--83  Echocardiogram with normal LVEF, no significant valvular abnormalities  Cardiac catheterization revealed normal coronaries  Continue statin, beta-blocker  Recommend low-dose aspirin  Patient would not like to initiate at this time due to underlying IBS  Consider alternative workup for further episodes of chest pain and presence of normal cardiac workup    Hypertension  Blood pressure 133/83  Continue lisinopril, atenolol    Dyslipidemia  High intensity statin therapy  Total cholesterol 173, HDL 45,   Diet lifestyle modifications discussed  Goal LDL less than 100    Patient's previous medical records, labs, and EKG were reviewed and discussed with the patient at today's visit.     Patient to be seen as  needed or in 6 months with cardiology    Electronically signed by YONAS Avelar, 01/03/25, 10:56 AM EST.

## 2025-01-03 ENCOUNTER — OFFICE VISIT (OUTPATIENT)
Dept: CARDIOLOGY | Facility: CLINIC | Age: 40
End: 2025-01-03
Payer: MEDICAID

## 2025-01-03 VITALS
HEIGHT: 72 IN | WEIGHT: 225 LBS | SYSTOLIC BLOOD PRESSURE: 133 MMHG | BODY MASS INDEX: 30.48 KG/M2 | DIASTOLIC BLOOD PRESSURE: 83 MMHG | OXYGEN SATURATION: 97 % | HEART RATE: 83 BPM

## 2025-01-03 DIAGNOSIS — R07.9 CHEST PAIN, UNSPECIFIED TYPE: ICD-10-CM

## 2025-01-03 DIAGNOSIS — I10 PRIMARY HYPERTENSION: ICD-10-CM

## 2025-01-03 DIAGNOSIS — I21.4 NSTEMI (NON-ST ELEVATED MYOCARDIAL INFARCTION): ICD-10-CM

## 2025-01-03 DIAGNOSIS — Z09 HOSPITAL DISCHARGE FOLLOW-UP: Primary | ICD-10-CM

## 2025-01-03 DIAGNOSIS — R42 DIZZINESS: ICD-10-CM

## 2025-02-25 ENCOUNTER — TRANSCRIBE ORDERS (OUTPATIENT)
Dept: ADMINISTRATIVE | Facility: HOSPITAL | Age: 40
End: 2025-02-25
Payer: MEDICAID

## 2025-02-25 ENCOUNTER — OFFICE (OUTPATIENT)
Dept: URBAN - METROPOLITAN AREA CLINIC 64 | Facility: CLINIC | Age: 40
End: 2025-02-25
Payer: MEDICAID

## 2025-02-25 VITALS
DIASTOLIC BLOOD PRESSURE: 77 MMHG | WEIGHT: 225 LBS | HEIGHT: 72 IN | SYSTOLIC BLOOD PRESSURE: 107 MMHG | HEART RATE: 79 BPM

## 2025-02-25 DIAGNOSIS — K21.9 GASTRO-ESOPHAGEAL REFLUX DISEASE WITHOUT ESOPHAGITIS: ICD-10-CM

## 2025-02-25 DIAGNOSIS — K59.00 CONSTIPATION, UNSPECIFIED: ICD-10-CM

## 2025-02-25 DIAGNOSIS — R10.9 UNSPECIFIED ABDOMINAL PAIN: ICD-10-CM

## 2025-02-25 DIAGNOSIS — R10.9 RIGHT SIDED ABDOMINAL PAIN: ICD-10-CM

## 2025-02-25 DIAGNOSIS — R79.89 ELEVATED LFTS: Primary | ICD-10-CM

## 2025-02-25 DIAGNOSIS — K21.9 GASTRIC REFLUX: ICD-10-CM

## 2025-02-25 DIAGNOSIS — K82.4 GALLBLADDER POLYP: ICD-10-CM

## 2025-02-25 DIAGNOSIS — K82.4 CHOLESTEROLOSIS OF GALLBLADDER: ICD-10-CM

## 2025-02-25 DIAGNOSIS — R79.89 OTHER SPECIFIED ABNORMAL FINDINGS OF BLOOD CHEMISTRY: ICD-10-CM

## 2025-02-25 DIAGNOSIS — K59.00 CONSTIPATION, UNSPECIFIED CONSTIPATION TYPE: ICD-10-CM

## 2025-02-25 PROCEDURE — 99214 OFFICE O/P EST MOD 30 MIN: CPT | Performed by: NURSE PRACTITIONER

## 2025-02-25 RX ORDER — ESOMEPRAZOLE MAGNESIUM 40 MG/1
40 CAPSULE, DELAYED RELEASE ORAL
Qty: 90 | Refills: 3 | Status: ACTIVE
Start: 2025-02-25

## 2025-03-05 ENCOUNTER — HOSPITAL ENCOUNTER (OUTPATIENT)
Dept: ULTRASOUND IMAGING | Facility: HOSPITAL | Age: 40
Discharge: HOME | End: 2025-03-05
Admitting: NURSE PRACTITIONER
Payer: MEDICAID

## 2025-03-05 DIAGNOSIS — R10.9 RIGHT SIDED ABDOMINAL PAIN: ICD-10-CM

## 2025-03-05 DIAGNOSIS — K21.9 GASTRIC REFLUX: ICD-10-CM

## 2025-03-05 DIAGNOSIS — K59.00 CONSTIPATION, UNSPECIFIED CONSTIPATION TYPE: ICD-10-CM

## 2025-03-05 DIAGNOSIS — R79.89 ELEVATED LFTS: ICD-10-CM

## 2025-03-05 DIAGNOSIS — K82.4 GALLBLADDER POLYP: ICD-10-CM

## 2025-03-05 PROCEDURE — 76705 ECHO EXAM OF ABDOMEN: CPT

## 2025-05-16 ENCOUNTER — OFFICE VISIT (OUTPATIENT)
Dept: FAMILY MEDICINE CLINIC | Facility: CLINIC | Age: 40
End: 2025-05-16
Payer: MEDICAID

## 2025-05-16 ENCOUNTER — LAB (OUTPATIENT)
Dept: LAB | Facility: HOSPITAL | Age: 40
End: 2025-05-16
Payer: MEDICAID

## 2025-05-16 VITALS
TEMPERATURE: 97.7 F | DIASTOLIC BLOOD PRESSURE: 74 MMHG | HEART RATE: 74 BPM | HEIGHT: 72 IN | OXYGEN SATURATION: 96 % | RESPIRATION RATE: 16 BRPM | WEIGHT: 222.1 LBS | SYSTOLIC BLOOD PRESSURE: 117 MMHG | BODY MASS INDEX: 30.08 KG/M2

## 2025-05-16 DIAGNOSIS — M54.50 ACUTE MIDLINE LOW BACK PAIN WITHOUT SCIATICA: ICD-10-CM

## 2025-05-16 DIAGNOSIS — M54.50 ACUTE BILATERAL LOW BACK PAIN WITHOUT SCIATICA: Primary | ICD-10-CM

## 2025-05-16 DIAGNOSIS — S16.1XXA ACUTE STRAIN OF NECK MUSCLE, INITIAL ENCOUNTER: ICD-10-CM

## 2025-05-16 LAB
DEPRECATED RDW RBC AUTO: 40.5 FL (ref 37–54)
ERYTHROCYTE [DISTWIDTH] IN BLOOD BY AUTOMATED COUNT: 12.1 % (ref 12.3–15.4)
HCT VFR BLD AUTO: 44 % (ref 37.5–51)
HGB BLD-MCNC: 14.8 G/DL (ref 13–17.7)
MCH RBC QN AUTO: 30.6 PG (ref 26.6–33)
MCHC RBC AUTO-ENTMCNC: 33.6 G/DL (ref 31.5–35.7)
MCV RBC AUTO: 91.1 FL (ref 79–97)
PLATELET # BLD AUTO: 351 10*3/MM3 (ref 140–450)
PMV BLD AUTO: 9.8 FL (ref 6–12)
RBC # BLD AUTO: 4.83 10*6/MM3 (ref 4.14–5.8)
WBC NRBC COR # BLD AUTO: 7.11 10*3/MM3 (ref 3.4–10.8)

## 2025-05-16 PROCEDURE — 85007 BL SMEAR W/DIFF WBC COUNT: CPT

## 2025-05-16 PROCEDURE — 85027 COMPLETE CBC AUTOMATED: CPT

## 2025-05-16 PROCEDURE — 82150 ASSAY OF AMYLASE: CPT

## 2025-05-16 PROCEDURE — 36415 COLL VENOUS BLD VENIPUNCTURE: CPT

## 2025-05-16 PROCEDURE — 83690 ASSAY OF LIPASE: CPT

## 2025-05-16 PROCEDURE — 80053 COMPREHEN METABOLIC PANEL: CPT

## 2025-05-16 PROCEDURE — 81003 URINALYSIS AUTO W/O SCOPE: CPT

## 2025-05-16 RX ORDER — LIDOCAINE 50 MG/G
1 PATCH TOPICAL EVERY 24 HOURS
Qty: 5 PATCH | Refills: 0 | Status: SHIPPED | OUTPATIENT
Start: 2025-05-16 | End: 2025-05-21

## 2025-05-16 RX ORDER — METHOCARBAMOL 500 MG/1
500 TABLET, FILM COATED ORAL 4 TIMES DAILY
Qty: 12 TABLET | Refills: 0 | Status: SHIPPED | OUTPATIENT
Start: 2025-05-16 | End: 2025-05-19

## 2025-05-16 NOTE — PROGRESS NOTES
"Chief Complaint  Back Pain (Lifted something heavy about 2 weeks ago, and has had intermittent pain if sitting long. ) and Neck Pain    Subjective        Davian Arevalo presents to [facility]  History of Present Illness  38 y/o male patient of Dr Zamorano's presents to  office to be seen for 2 weeks of low back pain and left sided posterior neck pain.  He denies fever, shortness of breath, cough.  He reports h/o back pain.  He states that he has not taken any over the counter medications for his pain.  He denies saddle anesthesia or any sudden loss of bowel or bladder control.  Back Pain  Chronicity:  Chronic  Onset:  1 to 4 weeks ago  Frequency:  Daily  Progression since onset:  Worsening  Pain location:  Lumbar spine  Pain quality:  Aching  Radiates to: lower abdomen.  Pain-numeric:  4/10  Pain severity:  Moderate and mild  Pain is:  The same all the time  Aggravated by:  Bending, sitting and twisting  Stiffness is present:  All day  Associated symptoms: abdominal pain    Risk factors:  Poor posture and sedentary lifestyle  Treatments tried:  Bed rest, physical therapy and home exercises  Improvement on treatment:  Mild  Neck Pain   This is a new problem. The current episode started in the past 7 days. The problem occurs daily. The problem has been gradually worsening. The pain is associated with lifting a heavy object. The pain is present in the left side and occipital region. The pain is at a severity of 4/10. The pain is mild. The pain is Same all the time. He has tried bed rest and home exercises for the symptoms. The treatment provided no relief.     Objective   Vital Signs:  /74 (BP Location: Left arm, Patient Position: Sitting, Cuff Size: Large Adult)   Pulse 74   Temp 97.7 °F (36.5 °C) (Infrared)   Resp 16   Ht 182.9 cm (72\")   Wt 101 kg (222 lb 1.6 oz)   SpO2 96%   BMI 30.12 kg/m²   Estimated body mass index is 30.12 kg/m² as calculated from the " "following:    Height as of this encounter: 182.9 cm (72\").    Weight as of this encounter: 101 kg (222 lb 1.6 oz).    Physical Exam  Vitals reviewed.   Constitutional:       General: He is not in acute distress.     Appearance: Normal appearance. He is not ill-appearing, toxic-appearing or diaphoretic.   HENT:      Head: Normocephalic and atraumatic.      Mouth/Throat:      Mouth: Mucous membranes are moist.      Pharynx: Oropharynx is clear. No oropharyngeal exudate or posterior oropharyngeal erythema.   Eyes:      Extraocular Movements: Extraocular movements intact.      Conjunctiva/sclera: Conjunctivae normal.      Pupils: Pupils are equal, round, and reactive to light.   Cardiovascular:      Rate and Rhythm: Normal rate and regular rhythm.      Pulses: Normal pulses.      Heart sounds: Normal heart sounds.   Pulmonary:      Effort: Pulmonary effort is normal.      Breath sounds: Normal breath sounds. No wheezing, rhonchi or rales.   Abdominal:      General: Bowel sounds are normal. There is no distension.      Palpations: Abdomen is soft.      Tenderness: There is abdominal tenderness in the right lower quadrant. There is no right CVA tenderness, left CVA tenderness, guarding or rebound. Negative signs include Rovsing's sign, McBurney's sign, psoas sign and obturator sign.      Hernia: No hernia is present.       Musculoskeletal:         General: No swelling. Normal range of motion.      Cervical back: Tenderness present. No swelling, edema, erythema or bony tenderness. No pain with movement.      Lumbar back: No swelling, edema, spasms, tenderness or bony tenderness. Normal range of motion. Negative right straight leg raise test and negative left straight leg raise test.        Back:       Right lower leg: No edema.      Left lower leg: No edema.   Neurological:      General: No focal deficit present.      Mental Status: He is alert and oriented to person, place, and time.      Motor: No weakness.      Gait: " Gait normal.   Psychiatric:         Attention and Perception: Attention and perception normal.         Mood and Affect: Affect is blunt and flat.         Speech: Speech normal.         Behavior: Behavior is cooperative.         Cognition and Memory: Cognition and memory normal.         Judgment: Judgment normal.        Result Review :         Assessment and Plan   Diagnoses and all orders for this visit:    1. Acute bilateral low back pain without sciatica (Primary)  Assessment & Plan:  Recommend OTC Tylenol, as directed.  Recommend warm compresses to low back and rest.  Patient reports some radiating pain to RLQ and R flank area.  U/a and CT of abd pelvis without contrast ordered.  Directed patient to [facility] outpatient lab for ordered labs.  CT to call and schedule ordered CT scan.  Verbal and written education provided on static back stretches and muscle pain.    Orders:  -     lidocaine (LIDODERM) 5 %; Place 1 patch on the skin as directed by provider Daily for 5 days. Remove & Discard patch within 12 hours or as directed by MD  Dispense: 5 patch; Refill: 0  -     methocarbamol (ROBAXIN) 500 MG tablet; Take 1 tablet by mouth 4 (Four) Times a Day for 3 days.  Dispense: 12 tablet; Refill: 0  -     Urinalysis With Culture If Indicated - Urine, Clean Catch; Future  -     Comprehensive metabolic panel; Future  -     CBC w MANUAL Differential; Future  -     CT Abdomen Pelvis With Contrast; Future  -     Lipase; Future  -     Amylase; Future    2. Acute strain of neck muscle, initial encounter  Assessment & Plan:  No decreased ROM of neck or back appreciated on physical exam.  + tenderness noted on palpation on left posterior cervical-trapezius muscle area.    Orders:  -     lidocaine (LIDODERM) 5 %; Place 1 patch on the skin as directed by provider Daily for 5 days. Remove & Discard patch within 12 hours or as directed by MD  Dispense: 5 patch; Refill: 0  -     methocarbamol (ROBAXIN) 500 MG tablet; Take 1 tablet by mouth  4 (Four) Times a Day for 3 days.  Dispense: 12 tablet; Refill: 0  -     Comprehensive metabolic panel; Future  -     CBC w MANUAL Differential; Future           I spent 20 minutes caring for Davian on this date of service. This time includes time spent by me in the following activities:obtaining and/or reviewing a separately obtained history, performing a medically appropriate examination and/or evaluation , counseling and educating the patient/family/caregiver, ordering medications, tests, or procedures, and documenting information in the medical record  Follow Up   Return if symptoms worsen or fail to improve.  Patient was given instructions and counseling regarding his condition or for health maintenance advice. Please see specific information pulled into the AVS if appropriate.

## 2025-05-16 NOTE — ASSESSMENT & PLAN NOTE
No decreased ROM of neck or back appreciated on physical exam.  + tenderness noted on palpation on left posterior cervical-trapezius muscle area.

## 2025-05-16 NOTE — ASSESSMENT & PLAN NOTE
Recommend OTC Tylenol, as directed.  Recommend warm compresses to low back and rest.  Patient reports some radiating pain to RLQ and R flank area.  U/a and CT of abd pelvis without contrast ordered.  Directed patient to [facility] outpatient lab for ordered labs.  CT to call and schedule ordered CT scan.  Verbal and written education provided on static back stretches and muscle pain.

## 2025-05-17 LAB
ALBUMIN SERPL-MCNC: 4.4 G/DL (ref 3.5–5.2)
ALBUMIN/GLOB SERPL: 1.6 G/DL
ALP SERPL-CCNC: 79 U/L (ref 39–117)
ALT SERPL W P-5'-P-CCNC: 68 U/L (ref 1–41)
AMYLASE SERPL-CCNC: 78 U/L (ref 28–100)
ANION GAP SERPL CALCULATED.3IONS-SCNC: 10.1 MMOL/L (ref 5–15)
AST SERPL-CCNC: 32 U/L (ref 1–40)
BASOPHILS # BLD MANUAL: 0.07 10*3/MM3 (ref 0–0.2)
BASOPHILS NFR BLD MANUAL: 1 % (ref 0–1.5)
BILIRUB SERPL-MCNC: 0.4 MG/DL (ref 0–1.2)
BILIRUB UR QL STRIP: NEGATIVE
BUN SERPL-MCNC: 19 MG/DL (ref 6–20)
BUN/CREAT SERPL: 19.4 (ref 7–25)
CALCIUM SPEC-SCNC: 9.5 MG/DL (ref 8.6–10.5)
CHLORIDE SERPL-SCNC: 107 MMOL/L (ref 98–107)
CLARITY UR: CLEAR
CO2 SERPL-SCNC: 27.9 MMOL/L (ref 22–29)
COLOR UR: ABNORMAL
CREAT SERPL-MCNC: 0.98 MG/DL (ref 0.76–1.27)
EGFRCR SERPLBLD CKD-EPI 2021: 99.3 ML/MIN/1.73
EOSINOPHIL # BLD MANUAL: 0.36 10*3/MM3 (ref 0–0.4)
EOSINOPHIL NFR BLD MANUAL: 5.1 % (ref 0.3–6.2)
GLOBULIN UR ELPH-MCNC: 2.8 GM/DL
GLUCOSE SERPL-MCNC: 84 MG/DL (ref 65–99)
GLUCOSE UR STRIP-MCNC: NEGATIVE MG/DL
HGB UR QL STRIP.AUTO: NEGATIVE
HOLD SPECIMEN: NORMAL
KETONES UR QL STRIP: ABNORMAL
LEUKOCYTE ESTERASE UR QL STRIP.AUTO: NEGATIVE
LIPASE SERPL-CCNC: 55 U/L (ref 13–60)
LYMPHOCYTES # BLD MANUAL: 2.44 10*3/MM3 (ref 0.7–3.1)
LYMPHOCYTES NFR BLD MANUAL: 6.1 % (ref 5–12)
MONOCYTES # BLD: 0.43 10*3/MM3 (ref 0.1–0.9)
NEUTROPHILS # BLD AUTO: 3.8 10*3/MM3 (ref 1.7–7)
NEUTROPHILS NFR BLD MANUAL: 53.5 % (ref 42.7–76)
NITRITE UR QL STRIP: NEGATIVE
PH UR STRIP.AUTO: 6 [PH] (ref 5–8)
PLAT MORPH BLD: NORMAL
POTASSIUM SERPL-SCNC: 4.2 MMOL/L (ref 3.5–5.2)
PROT SERPL-MCNC: 7.2 G/DL (ref 6–8.5)
PROT UR QL STRIP: ABNORMAL
RBC MORPH BLD: NORMAL
SODIUM SERPL-SCNC: 145 MMOL/L (ref 136–145)
SP GR UR STRIP: >1.03 (ref 1–1.03)
UROBILINOGEN UR QL STRIP: ABNORMAL
VARIANT LYMPHS NFR BLD MANUAL: 34.3 % (ref 19.6–45.3)
WBC MORPH BLD: NORMAL

## 2025-05-28 ENCOUNTER — OFFICE VISIT (OUTPATIENT)
Dept: FAMILY MEDICINE CLINIC | Facility: CLINIC | Age: 40
End: 2025-05-28
Payer: MEDICAID

## 2025-05-28 VITALS
DIASTOLIC BLOOD PRESSURE: 70 MMHG | SYSTOLIC BLOOD PRESSURE: 104 MMHG | WEIGHT: 216.4 LBS | HEART RATE: 69 BPM | TEMPERATURE: 97.7 F | HEIGHT: 72 IN | OXYGEN SATURATION: 98 % | BODY MASS INDEX: 29.31 KG/M2

## 2025-05-28 DIAGNOSIS — K59.04 CHRONIC IDIOPATHIC CONSTIPATION: ICD-10-CM

## 2025-05-28 DIAGNOSIS — S16.1XXA ACUTE STRAIN OF NECK MUSCLE, INITIAL ENCOUNTER: ICD-10-CM

## 2025-05-28 DIAGNOSIS — M54.50 ACUTE MIDLINE LOW BACK PAIN WITHOUT SCIATICA: Primary | ICD-10-CM

## 2025-05-28 RX ORDER — ESOMEPRAZOLE MAGNESIUM 40 MG/1
CAPSULE, DELAYED RELEASE ORAL
COMMUNITY
Start: 2025-02-25

## 2025-05-28 RX ORDER — CYCLOBENZAPRINE HCL 10 MG
10 TABLET ORAL NIGHTLY PRN
Qty: 15 TABLET | Refills: 0 | Status: SHIPPED | OUTPATIENT
Start: 2025-05-28

## 2025-05-28 NOTE — PROGRESS NOTES
"Chief Complaint  Back Pain and Neck Pain (Onset 2 weeks/Pain 3/10)    Subjective        Davian Arevalo presents to [facility]  History of Present Illness  He lifted some heavy toolboxes recently.   Back Pain  Chronicity:  New  Onset:  1 to 4 weeks ago  Frequency:  Daily  Progression since onset:  Unchanged  Pain location:  Lumbar spine  Pain quality:  Aching  Radiates to:  Does not radiate  Pain severity:  Moderate  Aggravated by:  Certain positions  Associated symptoms: no dysuria and no fever    Treatments tried:  Analgesics and NSAIDs  Neck Pain   Pertinent negatives include no fever.   Abdominal Cramping  Chronicity:  New  Onset:  1 to 4 weeks ago  Frequency:  Intermittently  Progression since onset:  Unchanged  Pain location:  RLQ and periumbilical region  Pain quality:  Cramping  Radiates to:  Does not radiate  Associated symptoms: no constipation, no diarrhea, no dysuria, no fever, no frequency, no hematuria and no melena    Treatments tried: MiraLax.  Improvement on treatment:  Mild      Objective   Vital Signs:  /70 (BP Location: Left arm, Patient Position: Sitting, Cuff Size: Large Adult)   Pulse 69   Temp 97.7 °F (36.5 °C) (Temporal)   Ht 182.9 cm (72\")   Wt 98.2 kg (216 lb 6.4 oz)   SpO2 98%   BMI 29.35 kg/m²   Estimated body mass index is 29.35 kg/m² as calculated from the following:    Height as of this encounter: 182.9 cm (72\").    Weight as of this encounter: 98.2 kg (216 lb 6.4 oz).    BMI is >= 25 and <30. (Overweight) The following options were offered after discussion;: exercise counseling/recommendations      Physical Exam  Vitals and nursing note reviewed.   Constitutional:       General: He is not in acute distress.     Appearance: He is well-developed.   HENT:      Head: Normocephalic.   Eyes:      General: Lids are normal.      Conjunctiva/sclera: Conjunctivae normal.   Neck:      Thyroid: No thyroid mass or thyromegaly.      Trachea: Trachea " normal.   Cardiovascular:      Rate and Rhythm: Normal rate and regular rhythm.      Heart sounds: Normal heart sounds.   Pulmonary:      Effort: Pulmonary effort is normal.      Breath sounds: Normal breath sounds.   Abdominal:      Palpations: Abdomen is soft.   Musculoskeletal:         General: Tenderness present.      Cervical back: Normal range of motion.   Lymphadenopathy:      Cervical: No cervical adenopathy.   Skin:     General: Skin is warm and dry.   Neurological:      Mental Status: He is alert and oriented to person, place, and time.   Psychiatric:         Attention and Perception: He is attentive.         Mood and Affect: Mood normal.         Speech: Speech normal.         Behavior: Behavior normal.        Result Review :  The following data was reviewed by: Merlene Zamorano MD on 05/28/2025:  Common labs          12/16/2024    04:24 12/20/2024    10:38 5/16/2025    17:02   Common Labs   Glucose 94  79  84    BUN 15  14  19    Creatinine 1.01  1.14  0.98    Sodium 138  138  145    Potassium 4.4  3.9  4.2    Chloride 102  95  107    Calcium 9.6  9.9  9.5    Albumin  4.6  4.4    Total Bilirubin  0.8  0.4    Alkaline Phosphatase  82  79    AST (SGOT)  33  32    ALT (SGPT)  65  68    WBC 9.76   7.11    Hemoglobin 16.1   14.8    Hematocrit 46.1   44.0    Platelets 317   351    Hemoglobin A1C  5.00                 Assessment and Plan   Diagnoses and all orders for this visit:    1. Acute midline low back pain without sciatica (Primary)  -     cyclobenzaprine (FLEXERIL) 10 MG tablet; Take 1 tablet by mouth At Night As Needed for Muscle Spasms.  Dispense: 15 tablet; Refill: 0    2. Acute strain of neck muscle, initial encounter  -     cyclobenzaprine (FLEXERIL) 10 MG tablet; Take 1 tablet by mouth At Night As Needed for Muscle Spasms.  Dispense: 15 tablet; Refill: 0    3. Chronic idiopathic constipation             Follow Up   No follow-ups on file.  Patient was given instructions and counseling regarding his  condition or for health maintenance advice. Please see specific information pulled into the AVS if appropriate.

## 2025-06-12 ENCOUNTER — HOSPITAL ENCOUNTER (OUTPATIENT)
Dept: CT IMAGING | Facility: HOSPITAL | Age: 40
Discharge: HOME | End: 2025-06-12
Admitting: NURSE PRACTITIONER
Payer: MEDICAID

## 2025-06-12 DIAGNOSIS — M54.50 ACUTE BILATERAL LOW BACK PAIN WITHOUT SCIATICA: ICD-10-CM

## 2025-06-12 PROCEDURE — 25510000001 IOPAMIDOL PER 1 ML: Performed by: NURSE PRACTITIONER

## 2025-06-12 PROCEDURE — 74177 CT ABD & PELVIS W/CONTRAST: CPT

## 2025-06-12 RX ORDER — IOPAMIDOL 755 MG/ML
100 INJECTION, SOLUTION INTRAVASCULAR
Status: COMPLETED | OUTPATIENT
Start: 2025-06-12 | End: 2025-06-12

## 2025-06-12 RX ADMIN — IOPAMIDOL 100 ML: 755 INJECTION, SOLUTION INTRAVENOUS at 14:46

## 2025-06-16 NOTE — PROGRESS NOTES
Physical Therapy Daily Progress Note     Diagnosis Plan   1. Acute bilateral low back pain without sciatica         VISIT#: 8    Subjective   Davian Arevalo reports: neck still painful, but not as painful as before. LBP doing ok today. Has found sleeping position that seems to help with neck, but has not been able to find new pillow. Drawing irritates neck.    Objective     See Exercise, Manual, and Modality Logs for complete treatment.   TM x 10 min at 1.8 mph    Patient Education:       Assessment/Plan - Focused on cervical spine: distraction and mobs to upper and lower cervical spine as well as thoracic decreased pain and self-reports of crepitus.     Goals  Plan Goals: SHORT TERM GOALS: Time for Goal Achievement: 4 weeks    1.  Patient to be compliant w/ the HEP and tolerate progression of exercise.  MET        2.  Cervical and lumbar Pain level < 5/10 at worst with mentioned activities to improve function. 3/10 Met  3.  Increased lumbar AROM to by 25% in all planes to allow for increased ease with sit-stand transfers and functional activities. Progressing.  4. Less cervical and lumbar pain with shopping, ADL's and IADL's .Met    Progress per Plan of Care - update HEP            Timed:         Manual Therapy:    10     mins  26585;     Therapeutic Exercise:    25     mins  21874;     Neuromuscular Madina:       mins  57814;    Therapeutic Activity:          mins  31948;     Gait Training:           mins  74772;     Ultrasound:          mins  43609;    Ionto                                   mins   81985  Self Care                            mins   21780  Canalith Repos                  mins  4209  Aquatic                               mins [id number]    Un-Timed:  Electrical Stimulation:    15     mins  26314 ( );  Dry Needling          mins self-pay  Traction          mins 01385  Low Eval          Mins  17933  Mod Eval          Mins  61587  High Eval                           Mins  06031  Re-Eval                                mins  10798    Timed Treatment:  35    mins   Total Treatment:    50    mins    Yemi Truong, PT PT, DPT, [id number]        list above/done

## 2025-06-24 DIAGNOSIS — M54.50 ACUTE MIDLINE LOW BACK PAIN WITHOUT SCIATICA: ICD-10-CM

## 2025-06-24 RX ORDER — MELOXICAM 15 MG/1
15 TABLET ORAL DAILY
Qty: 90 TABLET | Refills: 1 | Status: SHIPPED | OUTPATIENT
Start: 2025-06-24

## 2025-06-24 NOTE — TELEPHONE ENCOUNTER
Rx Refill Note  Requested Prescriptions     Pending Prescriptions Disp Refills    meloxicam (MOBIC) 15 MG tablet [Pharmacy Med Name: MELOXICAM 15 MG TABLET] 90 tablet 1     Sig: TAKE 1 TABLET BY MOUTH DAILY      Last office visit with prescribing clinician: 5/28/2025   Last telemedicine visit with prescribing clinician: Visit date not found   Next office visit with prescribing clinician: Visit date not found                         Would you like a call back once the refill request has been completed: [] Yes [] No    If the office needs to give you a call back, can they leave a voicemail: [] Yes [] No    Lisseth Guevara MA  06/24/25, 14:14 EDT

## 2025-07-08 NOTE — PROGRESS NOTES
Subjective:     Encounter Date:07/10/2025      Patient ID: Davian Arevalo is a 39 y.o. male.    Chief Complaint:  History of Present Illness    Davian Arevalo is a 39 y.o. male with history of hypertension, dyslipidemia who presents to the office today for routine cardiac care/follow-up.   Patient seen and examined, labs and chart reviewed. Patient states he is doing well from cardiology standpoint as he denies chest pain, shortness of breath, palpitations, numbness/tingling, nausea, vomiting, edema, syncope.  He notes intermittent episodes of dizziness upon standing.  Blood pressure is borderline low today and per chart review at visit in May was also borderline low.  Discussed decreasing blood pressure medication.  However, patient does not wish to do that today.  He takes all medications as prescribed.  He never been a smoker.      The following portions of the patient's history were reviewed and updated as appropriate: allergies, current medications, past family history, past medical history, past social history, past surgical history, and problem list.    Past Medical History:   Diagnosis Date    Abdominal pain, chronic, right lower quadrant 01/30/2015    Anxiety     Autism 08/13/2014    mild ( [facility]    Carpal tunnel syndrome 02/22/2018    Constipation 09/21/2018    Depression     Dyslexia 12/26/2017    Hypertension     IBS (irritable bowel syndrome) 09/21/2018    IBS (irritable bowel syndrome)     Knee pain, left 07/25/2017    Lower back pain 01/30/2015    Right cervical radiculopathy 01/07/2019    Shoulder pain, right 02/14/2017    Tinnitus     Vitamin D deficiency 12/26/2017     Past Surgical History:   Procedure Laterality Date    ADENOIDECTOMY      CARDIAC CATHETERIZATION N/A 12/15/2024    Procedure: Left Heart Cath;  Surgeon: Jeyson Easley DO;  Location: [facility] CATH INVASIVE LOCATION;  Service: Cardiovascular;  Laterality: N/A;  Right radial approach     "COLONOSCOPY      CYSTOSCOPY, URETEROSCOPY, RETROGRADE PYELOGRAM, STENT INSERTION Right 12/15/2020    Procedure: CYSTOSCOPY URETEROSCOPY STONE BASKET EXTRACTION AND STENT INSERTION;  Surgeon: Gerry Giron MD;  Location: [facility] MAIN OR;  Service: Urology;  Laterality: Right;    TONSILLECTOMY  1993    Tonsillectomy and Adenoids-    TONSILLECTOMY AND ADENOIDECTOMY       /76 (BP Location: Left arm, Patient Position: Sitting, Cuff Size: Adult)   Pulse 84   Ht 182.9 cm (72\")   Wt 97.8 kg (215 lb 9.6 oz)   SpO2 97%   BMI 29.24 kg/m²     Family History   Problem Relation Age of Onset    Cancer Mother         breast    Hypertension Mother     Atrial fibrillation Mother     Cancer Father     Hypertension Father     Stroke Father     Hyperlipidemia Father     Diabetes Paternal Uncle     Diabetes Maternal Grandmother     Stroke Maternal Grandfather     Diabetes Maternal Grandfather     Heart disease Paternal Grandmother     Stroke Other        Current Outpatient Medications:     acetaminophen (TYLENOL) 500 MG tablet, Take 1 tablet by mouth Every 6 (Six) Hours As Needed for Mild Pain., Disp: , Rfl:     atenolol (TENORMIN) 25 MG tablet, TAKE ONE TABLET BY MOUTH ONCE NIGHTLY, Disp: 90 tablet, Rfl: 2    cholecalciferol (VITAMIN D3) 1000 units tablet, Take 1 tablet by mouth Daily., Disp: , Rfl:     dicyclomine (BENTYL) 20 MG tablet, Take 1 tablet by mouth 3 (Three) Times a Day., Disp: , Rfl:     lisinopril (PRINIVIL,ZESTRIL) 20 MG tablet, TAKE 1 TABLET BY MOUTH DAILY, Disp: 90 tablet, Rfl: 3    meloxicam (MOBIC) 15 MG tablet, TAKE 1 TABLET BY MOUTH DAILY, Disp: 90 tablet, Rfl: 1    nortriptyline (PAMELOR) 50 MG capsule, Take 2 capsules by mouth Every Night., Disp: , Rfl:     Omeprazole Magnesium ([employer] Omeprazole Magnesium) 20.6 (20 Base) MG capsule delayed-release, Take 1 tablet by mouth Daily., Disp: , Rfl:     vitamin B-12 (CYANOCOBALAMIN) 1000 MCG tablet, Take 1 tablet by mouth Daily., Disp: , Rfl:     atorvastatin " (LIPITOR) 80 MG tablet, Take 1 tablet by mouth Every Night. (Patient not taking: Reported on 5/16/2025), Disp: 90 tablet, Rfl: 0    cyclobenzaprine (FLEXERIL) 10 MG tablet, Take 1 tablet by mouth At Night As Needed for Muscle Spasms. (Patient not taking: Reported on 7/10/2025), Disp: 15 tablet, Rfl: 0    esomeprazole (nexIUM) 40 MG capsule, , Disp: , Rfl:     Allergies   Allergen Reactions    Ceclor [Cefaclor] Hives    Amoxicillin Hives    Cephalexin Hives     Tolerated CTX 12/2020 admission    Cloral Hives     Social History     Socioeconomic History    Marital status: Single   Tobacco Use    Smoking status: Never     Passive exposure: Never    Smokeless tobacco: Never   Vaping Use    Vaping status: Never Used   Substance and Sexual Activity    Alcohol use: Not Currently    Drug use: No    Sexual activity: Defer     Review of Systems   Constitutional: Positive for malaise/fatigue.   Cardiovascular:  Negative for chest pain, dyspnea on exertion, leg swelling and palpitations.   Respiratory:  Negative for cough and shortness of breath.    Gastrointestinal:  Negative for abdominal pain, nausea and vomiting.   Neurological:  Positive for light-headedness. Negative for dizziness, headaches, numbness and weakness.   All other systems reviewed and are negative.             Objective:     Physical Exam    Procedures      LAB RESULTS (LAST 7 DAYS)  Lab Review:   Echocardiogram   Results for orders placed during the hospital encounter of 12/13/24    Adult Transthoracic Echo Complete W/ Cont if Necessary Per Protocol 12/14/2024  3:30 PM    Interpretation Summary    Left ventricular systolic function is normal. Left ventricular ejection fraction appears to be 66 - 70%.    Left ventricular diastolic function was normal.    Estimated right ventricular systolic pressure from tricuspid regurgitation is normal (<35 mmHg). Calculated right ventricular systolic pressure from tricuspid regurgitation is 20 mmHg.      Cardiac  Catheterization   Results for orders placed during the hospital encounter of 24    Cardiac Catheterization/Vascular Study    Conclusion  Table formatting from the original result was not included.  Cardiac Catheterization Operative Report  PATIENT IDENTIFICATION  Name: Davian Arevalo  Age: 39 y.o. Sex: male : 1985  MRN: [id number]    Date: 12/15/2024  PCP: @PCP@    Requesting Provider  [unfilled]    He underwent cardiac catheterization.    Indications for the procedure include: Elevated troponin.    Procedure Details:  The risks, benefits, complications, treatment options, and expected outcomes were discussed with the patient. The patient and/or family concurred with the proposed plan, giving informed consent.    After informed consent the patient was brought to the cath lab after appropriate IV hydration was begun and oral premedication was given. He was further sedated with fentanyl and midazolam. He was prepped and draped in the usual manner. Using the modified Seldinger access technique and a micropuncture kit, a 6 Sami sheath was placed in the radial artery. A left heart catheterization with coronary arteriography was performed. Findings are discussed below.    After the procedure was completed, sedation was stopped and the sheaths and catheters were all removed. Hemostasis was achieved per established hospital protocols.    Conscious sedation:  Conscious sedation was performed according to protocol.  I supervised and directed an independent trained observer with the assistance of monitoring the patient's level of consciousness and physiologic status throughout the procedure.  Intraoperative service time was 35 minutes.    Findings:    Hemodynamics LVEDP: 12  LV: 116/7  Ao: 114/85  Left Main No significant disease  RCA No significant disease  LAD Very large, no significant disease  Circ No significant disease  SVG(s) Not applicable  HARRY Not applicable  LV Low normal left ventricular systolic  function EF 50%  Coronary Dominance RCA    Estimated Blood Loss:  Minimal    Specimens: None    Complications:  None; patient tolerated the procedure well.    Disposition: PACU - hemodynamically stable    Condition: stable    Impressions:  No angiographic evidence for significant epicardial occlusive disease  Low normal left ventricular systolic function ejection fraction 50%    Recommendations:  Medical therapy and risk factor modification      CBC        BMP        CMP         BNP        TROPONIN        CoAg        Creatinine Clearance  CrCl cannot be calculated (Patient's most recent lab result is older than the maximum 30 days allowed.).    ABG        Radiology  No radiology results for the last day              Assessment    Diagnoses and all orders for this visit:    1. Primary hypertension (Primary)    2. Dizziness  Overview:  Likely due to ear infection.      3. NSTEMI (non-ST elevated myocardial infarction)                  MDM    Hypertension  Blood pressure 106/76  Atenolol 25 mg  Lisinopril 20 mg    Dyslipidemia   statin therapy    Goal LDL <100   Diet and lifestyle modifications discussed    History of NSTEMI  Hospitalization in December 2024 6--83  Cardiac catheterization revealed normal coronaries  Echocardiogram with LVEF of 66 to 70%, normal diastolic function  A1c 5.0       Patient's previous medical records, labs, and EKG were reviewed and discussed with the patient at today's visit.     Patient to be seen as needed or in cardiology    Electronically signed by YONAS Avelar, 07/10/25, 3:39 PM EDT.

## 2025-07-10 ENCOUNTER — OFFICE VISIT (OUTPATIENT)
Dept: CARDIOLOGY | Facility: CLINIC | Age: 40
End: 2025-07-10
Payer: MEDICAID

## 2025-07-10 VITALS
BODY MASS INDEX: 29.2 KG/M2 | OXYGEN SATURATION: 97 % | HEART RATE: 84 BPM | DIASTOLIC BLOOD PRESSURE: 76 MMHG | WEIGHT: 215.6 LBS | HEIGHT: 72 IN | SYSTOLIC BLOOD PRESSURE: 106 MMHG

## 2025-07-10 DIAGNOSIS — R42 DIZZINESS: ICD-10-CM

## 2025-07-10 DIAGNOSIS — I21.4 NSTEMI (NON-ST ELEVATED MYOCARDIAL INFARCTION): ICD-10-CM

## 2025-07-10 DIAGNOSIS — I10 PRIMARY HYPERTENSION: Primary | ICD-10-CM

## 2025-07-10 PROCEDURE — 3074F SYST BP LT 130 MM HG: CPT

## 2025-07-10 PROCEDURE — 1160F RVW MEDS BY RX/DR IN RCRD: CPT

## 2025-07-10 PROCEDURE — 3078F DIAST BP <80 MM HG: CPT

## 2025-07-10 PROCEDURE — 99213 OFFICE O/P EST LOW 20 MIN: CPT

## 2025-07-10 PROCEDURE — 1159F MED LIST DOCD IN RCRD: CPT

## (undated) DEVICE — ST ACC MICROPUNCTURE STFF/CANN PLAT/TP 4F 21G 40CM

## (undated) DEVICE — NITINOL WIRE WITH HYDROPHILIC TIP: Brand: SENSOR

## (undated) DEVICE — SOL IRRG H2O BG 3000ML STRL

## (undated) DEVICE — RADIFOCUS OPTITORQUE ANGIOGRAPHIC CATHETER: Brand: OPTITORQUE

## (undated) DEVICE — NITINOL STONE RETRIEVAL BASKET: Brand: ZERO TIP

## (undated) DEVICE — KT SURG TURNOVER 050

## (undated) DEVICE — TR BAND RADIAL ARTERY COMPRESSION DEVICE: Brand: TR BAND

## (undated) DEVICE — GLV SURG SIGNATURE ESSENTIAL PF LTX SZ7

## (undated) DEVICE — CATH DIAG IMPULSE FR4 6F 100CM

## (undated) DEVICE — DUAL LUMEN URETERAL CATHETER

## (undated) DEVICE — PINNACLE INTRODUCER SHEATH: Brand: PINNACLE

## (undated) DEVICE — SYS IRR PUMP SGL ACTN VAC SYR 10CC

## (undated) DEVICE — PK TRY HEART CATH 50

## (undated) DEVICE — PK CYSTO 50

## (undated) DEVICE — DGW .035 FC J3MM 150CM TEF HEP: Brand: EMERALD